# Patient Record
Sex: MALE | Race: WHITE | NOT HISPANIC OR LATINO | Employment: OTHER | URBAN - METROPOLITAN AREA
[De-identification: names, ages, dates, MRNs, and addresses within clinical notes are randomized per-mention and may not be internally consistent; named-entity substitution may affect disease eponyms.]

---

## 2018-11-02 ENCOUNTER — TRANSCRIBE ORDERS (OUTPATIENT)
Dept: PULMONOLOGY | Facility: HOSPITAL | Age: 38
End: 2018-11-02

## 2018-11-02 DIAGNOSIS — G47.30 SLEEP APNEA, UNSPECIFIED TYPE: Primary | ICD-10-CM

## 2018-11-29 ENCOUNTER — HOSPITAL ENCOUNTER (OUTPATIENT)
Dept: SLEEP MEDICINE | Facility: HOSPITAL | Age: 38
Discharge: HOME | End: 2018-11-29
Admitting: INTERNAL MEDICINE

## 2018-11-29 VITALS
HEIGHT: 72 IN | DIASTOLIC BLOOD PRESSURE: 92 MMHG | HEART RATE: 94 BPM | WEIGHT: 214.8 LBS | OXYGEN SATURATION: 95 % | BODY MASS INDEX: 29.09 KG/M2 | SYSTOLIC BLOOD PRESSURE: 156 MMHG

## 2018-11-29 DIAGNOSIS — G47.30 SLEEP APNEA, UNSPECIFIED TYPE: ICD-10-CM

## 2018-11-29 PROCEDURE — 95806 SLEEP STUDY UNATT&RESP EFFT: CPT

## 2019-01-08 ENCOUNTER — LAB (OUTPATIENT)
Dept: LAB | Facility: HOSPITAL | Age: 39
End: 2019-01-08

## 2019-01-08 ENCOUNTER — OFFICE VISIT (OUTPATIENT)
Dept: INTERNAL MEDICINE | Facility: CLINIC | Age: 39
End: 2019-01-08

## 2019-01-08 ENCOUNTER — HOSPITAL ENCOUNTER (INPATIENT)
Facility: HOSPITAL | Age: 39
LOS: 2 days | Discharge: HOME | End: 2019-01-10
Attending: EMERGENCY MEDICINE | Admitting: INTERNAL MEDICINE

## 2019-01-08 ENCOUNTER — HOSPITAL ENCOUNTER (OUTPATIENT)
Dept: CT IMAGING | Facility: HOSPITAL | Age: 39
Discharge: HOME | End: 2019-01-08
Admitting: NURSE PRACTITIONER

## 2019-01-08 VITALS
TEMPERATURE: 99 F | WEIGHT: 210 LBS | HEART RATE: 88 BPM | SYSTOLIC BLOOD PRESSURE: 118 MMHG | HEIGHT: 71 IN | BODY MASS INDEX: 29.4 KG/M2 | DIASTOLIC BLOOD PRESSURE: 86 MMHG

## 2019-01-08 DIAGNOSIS — K57.20 ABSCESS OF SIGMOID COLON DUE TO DIVERTICULITIS: Primary | ICD-10-CM

## 2019-01-08 DIAGNOSIS — R10.32 LEFT LOWER QUADRANT PAIN: ICD-10-CM

## 2019-01-08 DIAGNOSIS — R10.32 LEFT LOWER QUADRANT PAIN: Primary | ICD-10-CM

## 2019-01-08 PROBLEM — Z72.0 TOBACCO ABUSE: Status: ACTIVE | Noted: 2019-01-08

## 2019-01-08 PROBLEM — A41.9 SEPSIS: Status: ACTIVE | Noted: 2019-01-08

## 2019-01-08 LAB
ALBUMIN SERPL-MCNC: 4.13 G/DL (ref 3.2–4.8)
ALBUMIN SERPL-MCNC: 4.14 G/DL (ref 3.2–4.8)
ALBUMIN/GLOB SERPL: 1.4 G/DL (ref 1.5–2.5)
ALBUMIN/GLOB SERPL: 1.6 G/DL (ref 1.5–2.5)
ALP SERPL-CCNC: 84 U/L (ref 25–100)
ALP SERPL-CCNC: 90 U/L (ref 25–100)
ALT SERPL W P-5'-P-CCNC: 28 U/L (ref 7–40)
ALT SERPL W P-5'-P-CCNC: 30 U/L (ref 7–40)
ANION GAP SERPL CALCULATED.3IONS-SCNC: 10 MMOL/L (ref 3–11)
ANION GAP SERPL CALCULATED.3IONS-SCNC: 9 MMOL/L (ref 3–11)
AST SERPL-CCNC: 21 U/L (ref 0–33)
AST SERPL-CCNC: 23 U/L (ref 0–33)
BACTERIA UR QL AUTO: NORMAL /HPF
BASOPHILS # BLD AUTO: 0.05 10*3/MM3 (ref 0–0.2)
BASOPHILS # BLD AUTO: 0.05 10*3/MM3 (ref 0–0.2)
BASOPHILS NFR BLD AUTO: 0.3 % (ref 0–1)
BASOPHILS NFR BLD AUTO: 0.4 % (ref 0–1)
BILIRUB SERPL-MCNC: 0.6 MG/DL (ref 0.3–1.2)
BILIRUB SERPL-MCNC: 0.6 MG/DL (ref 0.3–1.2)
BILIRUB UR QL STRIP: NEGATIVE
BILIRUB UR QL STRIP: NEGATIVE
BUN BLD-MCNC: 11 MG/DL (ref 9–23)
BUN BLD-MCNC: 11 MG/DL (ref 9–23)
BUN/CREAT SERPL: 13.4 (ref 7–25)
BUN/CREAT SERPL: 14.1 (ref 7–25)
CALCIUM SPEC-SCNC: 8.8 MG/DL (ref 8.7–10.4)
CALCIUM SPEC-SCNC: 9 MG/DL (ref 8.7–10.4)
CHLORIDE SERPL-SCNC: 98 MMOL/L (ref 99–109)
CHLORIDE SERPL-SCNC: 99 MMOL/L (ref 99–109)
CLARITY UR: CLEAR
CLARITY UR: CLEAR
CO2 SERPL-SCNC: 26 MMOL/L (ref 20–31)
CO2 SERPL-SCNC: 26 MMOL/L (ref 20–31)
COLOR UR: YELLOW
COLOR UR: YELLOW
CREAT BLD-MCNC: 0.78 MG/DL (ref 0.6–1.3)
CREAT BLD-MCNC: 0.82 MG/DL (ref 0.6–1.3)
D-LACTATE SERPL-SCNC: 0.7 MMOL/L (ref 0.5–2)
DEPRECATED RDW RBC AUTO: 42.9 FL (ref 37–54)
DEPRECATED RDW RBC AUTO: 44.2 FL (ref 37–54)
EOSINOPHIL # BLD AUTO: 0.06 10*3/MM3 (ref 0–0.3)
EOSINOPHIL # BLD AUTO: 0.08 10*3/MM3 (ref 0–0.3)
EOSINOPHIL NFR BLD AUTO: 0.5 % (ref 0–3)
EOSINOPHIL NFR BLD AUTO: 0.5 % (ref 0–3)
ERYTHROCYTE [DISTWIDTH] IN BLOOD BY AUTOMATED COUNT: 12.4 % (ref 11.3–14.5)
ERYTHROCYTE [DISTWIDTH] IN BLOOD BY AUTOMATED COUNT: 12.7 % (ref 11.3–14.5)
GFR SERPL CREATININE-BSD FRML MDRD: 105 ML/MIN/1.73
GFR SERPL CREATININE-BSD FRML MDRD: 111 ML/MIN/1.73
GLOBULIN UR ELPH-MCNC: 2.6 GM/DL
GLOBULIN UR ELPH-MCNC: 2.9 GM/DL
GLUCOSE BLD-MCNC: 78 MG/DL (ref 70–100)
GLUCOSE BLD-MCNC: 83 MG/DL (ref 70–100)
GLUCOSE UR STRIP-MCNC: NEGATIVE MG/DL
GLUCOSE UR STRIP-MCNC: NEGATIVE MG/DL
HCT VFR BLD AUTO: 39.7 % (ref 38.9–50.9)
HCT VFR BLD AUTO: 42.5 % (ref 38.9–50.9)
HGB BLD-MCNC: 13.1 G/DL (ref 13.1–17.5)
HGB BLD-MCNC: 13.9 G/DL (ref 13.1–17.5)
HGB UR QL STRIP.AUTO: NEGATIVE
HGB UR QL STRIP.AUTO: NEGATIVE
HYALINE CASTS UR QL AUTO: NORMAL /LPF
IMM GRANULOCYTES # BLD AUTO: 0.05 10*3/MM3 (ref 0–0.03)
IMM GRANULOCYTES # BLD AUTO: 0.06 10*3/MM3 (ref 0–0.03)
IMM GRANULOCYTES NFR BLD AUTO: 0.4 % (ref 0–0.6)
IMM GRANULOCYTES NFR BLD AUTO: 0.4 % (ref 0–0.6)
KETONES UR QL STRIP: ABNORMAL
KETONES UR QL STRIP: ABNORMAL
LEUKOCYTE ESTERASE UR QL STRIP.AUTO: NEGATIVE
LEUKOCYTE ESTERASE UR QL STRIP.AUTO: NEGATIVE
LYMPHOCYTES # BLD AUTO: 1.42 10*3/MM3 (ref 0.6–4.8)
LYMPHOCYTES # BLD AUTO: 1.69 10*3/MM3 (ref 0.6–4.8)
LYMPHOCYTES NFR BLD AUTO: 11 % (ref 24–44)
LYMPHOCYTES NFR BLD AUTO: 11.6 % (ref 24–44)
MCH RBC QN AUTO: 30.9 PG (ref 27–31)
MCH RBC QN AUTO: 31.2 PG (ref 27–31)
MCHC RBC AUTO-ENTMCNC: 32.7 G/DL (ref 32–36)
MCHC RBC AUTO-ENTMCNC: 33 G/DL (ref 32–36)
MCV RBC AUTO: 93.6 FL (ref 80–99)
MCV RBC AUTO: 95.3 FL (ref 80–99)
MONOCYTES # BLD AUTO: 2.12 10*3/MM3 (ref 0–1)
MONOCYTES # BLD AUTO: 2.41 10*3/MM3 (ref 0–1)
MONOCYTES NFR BLD AUTO: 16.4 % (ref 0–12)
MONOCYTES NFR BLD AUTO: 16.5 % (ref 0–12)
NEUTROPHILS # BLD AUTO: 10.34 10*3/MM3 (ref 1.5–8.3)
NEUTROPHILS # BLD AUTO: 9.25 10*3/MM3 (ref 1.5–8.3)
NEUTROPHILS NFR BLD AUTO: 71.1 % (ref 41–71)
NEUTROPHILS NFR BLD AUTO: 71.7 % (ref 41–71)
NITRITE UR QL STRIP: NEGATIVE
NITRITE UR QL STRIP: NEGATIVE
PH UR STRIP.AUTO: 5.5 [PH] (ref 5–8)
PH UR STRIP.AUTO: 6 [PH] (ref 5–8)
PLATELET # BLD AUTO: 343 10*3/MM3 (ref 150–450)
PLATELET # BLD AUTO: 384 10*3/MM3 (ref 150–450)
PMV BLD AUTO: 10.1 FL (ref 6–12)
PMV BLD AUTO: 10.6 FL (ref 6–12)
POTASSIUM BLD-SCNC: 3.8 MMOL/L (ref 3.5–5.5)
POTASSIUM BLD-SCNC: 4.2 MMOL/L (ref 3.5–5.5)
PROT SERPL-MCNC: 6.7 G/DL (ref 5.7–8.2)
PROT SERPL-MCNC: 7 G/DL (ref 5.7–8.2)
PROT UR QL STRIP: NEGATIVE
PROT UR QL STRIP: NEGATIVE
RBC # BLD AUTO: 4.24 10*6/MM3 (ref 4.2–5.76)
RBC # BLD AUTO: 4.46 10*6/MM3 (ref 4.2–5.76)
RBC # UR: NORMAL /HPF
REF LAB TEST METHOD: NORMAL
SODIUM BLD-SCNC: 133 MMOL/L (ref 132–146)
SODIUM BLD-SCNC: 135 MMOL/L (ref 132–146)
SP GR UR STRIP: 1.01 (ref 1–1.03)
SP GR UR STRIP: 1.09 (ref 1–1.03)
SQUAMOUS #/AREA URNS HPF: NORMAL /HPF
UROBILINOGEN UR QL STRIP: ABNORMAL
UROBILINOGEN UR QL STRIP: ABNORMAL
WBC NRBC COR # BLD: 12.9 10*3/MM3 (ref 3.5–10.8)
WBC NRBC COR # BLD: 14.57 10*3/MM3 (ref 3.5–10.8)
WBC UR QL AUTO: NORMAL /HPF

## 2019-01-08 PROCEDURE — 85025 COMPLETE CBC W/AUTO DIFF WBC: CPT | Performed by: NURSE PRACTITIONER

## 2019-01-08 PROCEDURE — 85025 COMPLETE CBC W/AUTO DIFF WBC: CPT | Performed by: PHYSICIAN ASSISTANT

## 2019-01-08 PROCEDURE — 83605 ASSAY OF LACTIC ACID: CPT | Performed by: PHYSICIAN ASSISTANT

## 2019-01-08 PROCEDURE — 80053 COMPREHEN METABOLIC PANEL: CPT

## 2019-01-08 PROCEDURE — 25010000002 IOPAMIDOL 61 % SOLUTION: Performed by: NURSE PRACTITIONER

## 2019-01-08 PROCEDURE — 99214 OFFICE O/P EST MOD 30 MIN: CPT | Performed by: NURSE PRACTITIONER

## 2019-01-08 PROCEDURE — 81001 URINALYSIS AUTO W/SCOPE: CPT

## 2019-01-08 PROCEDURE — 99223 1ST HOSP IP/OBS HIGH 75: CPT | Performed by: INTERNAL MEDICINE

## 2019-01-08 PROCEDURE — 74178 CT ABD&PLV WO CNTR FLWD CNTR: CPT

## 2019-01-08 PROCEDURE — 99284 EMERGENCY DEPT VISIT MOD MDM: CPT

## 2019-01-08 PROCEDURE — 25010000002 PIPERACILLIN SOD-TAZOBACTAM PER 1 G: Performed by: PHYSICIAN ASSISTANT

## 2019-01-08 PROCEDURE — 80053 COMPREHEN METABOLIC PANEL: CPT | Performed by: PHYSICIAN ASSISTANT

## 2019-01-08 PROCEDURE — 99406 BEHAV CHNG SMOKING 3-10 MIN: CPT | Performed by: PHYSICIAN ASSISTANT

## 2019-01-08 PROCEDURE — 81003 URINALYSIS AUTO W/O SCOPE: CPT | Performed by: PHYSICIAN ASSISTANT

## 2019-01-08 PROCEDURE — 36415 COLL VENOUS BLD VENIPUNCTURE: CPT

## 2019-01-08 RX ORDER — SODIUM CHLORIDE 9 MG/ML
100 INJECTION, SOLUTION INTRAVENOUS CONTINUOUS
Status: DISCONTINUED | OUTPATIENT
Start: 2019-01-08 | End: 2019-01-10 | Stop reason: HOSPADM

## 2019-01-08 RX ORDER — ACETAMINOPHEN 325 MG/1
650 TABLET ORAL EVERY 4 HOURS PRN
Status: DISCONTINUED | OUTPATIENT
Start: 2019-01-08 | End: 2019-01-10 | Stop reason: HOSPADM

## 2019-01-08 RX ORDER — SODIUM CHLORIDE 0.9 % (FLUSH) 0.9 %
3-10 SYRINGE (ML) INJECTION AS NEEDED
Status: DISCONTINUED | OUTPATIENT
Start: 2019-01-08 | End: 2019-01-10 | Stop reason: HOSPADM

## 2019-01-08 RX ORDER — SODIUM CHLORIDE 0.9 % (FLUSH) 0.9 %
3 SYRINGE (ML) INJECTION EVERY 12 HOURS SCHEDULED
Status: DISCONTINUED | OUTPATIENT
Start: 2019-01-08 | End: 2019-01-10 | Stop reason: HOSPADM

## 2019-01-08 RX ORDER — SODIUM CHLORIDE 0.9 % (FLUSH) 0.9 %
10 SYRINGE (ML) INJECTION AS NEEDED
Status: DISCONTINUED | OUTPATIENT
Start: 2019-01-08 | End: 2019-01-10 | Stop reason: HOSPADM

## 2019-01-08 RX ORDER — OXYMETAZOLINE HYDROCHLORIDE 1 G/100G
1 CREAM TOPICAL EVERY MORNING
Refills: 2 | COMMUNITY
Start: 2018-11-01 | End: 2023-03-14

## 2019-01-08 RX ADMIN — TAZOBACTAM SODIUM AND PIPERACILLIN SODIUM 4.5 G: 500; 4 INJECTION, SOLUTION INTRAVENOUS at 19:51

## 2019-01-08 RX ADMIN — SODIUM CHLORIDE 100 ML/HR: 9 INJECTION, SOLUTION INTRAVENOUS at 22:15

## 2019-01-08 RX ADMIN — IOPAMIDOL 95 ML: 612 INJECTION, SOLUTION INTRAVENOUS at 17:22

## 2019-01-08 RX ADMIN — SODIUM CHLORIDE 1000 ML: 9 INJECTION, SOLUTION INTRAVENOUS at 19:10

## 2019-01-08 NOTE — PROGRESS NOTES
Arie Hayden  1980  [id number]  Patient Care Team:  Maira Dawkins PA-C as PCP - General (Internal Medicine)    Arie Hayden is a pleasant 38 y.o. male who presents for evaluation of Fatigue (started the Saturday before New years ); Night Sweats; and Abdominal Pain      Chief Complaint   Patient presents with   • Fatigue     started the Saturday before New years    • Night Sweats   • Abdominal Pain       HPI:   Onset LLQ abdominal pain 10 days, better then worse.  Sig night sweats last 2 nights, no significant cough, denies sob or fever. Has constant dull mild left lower abdominal pain.  Denies nausea or vomiting.Some diarrhea a few days ago, mild.  No blood in stool.   No sore throat, no ill contacts, no travel out of the country.  No LE swelling.  Reports regular alcohol consumption 2-3 times a week no change.  Reports social smoking, no change.   History reviewed. No pertinent past medical history.    History reviewed. No pertinent surgical history.    History reviewed. No pertinent family history.    Social History     Tobacco Use   Smoking Status Light Tobacco Smoker   • Types: Cigarettes   • Start date: 1/8/2004   Smokeless Tobacco Former User   Tobacco Comment    occasionally        Allergies   Allergen Reactions   • Pollen Extract Other (See Comments)     Sinus related       Review of Systems   Constitutional: Positive for chills, fatigue and fever.   HENT: Negative for congestion, ear pain and sinus pressure.    Respiratory: Negative for cough, chest tightness, shortness of breath and wheezing.    Cardiovascular: Negative for chest pain and palpitations.   Gastrointestinal: Positive for abdominal pain. Negative for blood in stool and constipation.   Skin: Negative for color change.   Allergic/Immunologic: Positive for environmental allergies.   Neurological: Positive for headache. Negative for dizziness and speech difficulty.   Psychiatric/Behavioral: Negative for decreased concentration. The patient  "is not nervous/anxious.        Vitals:    01/08/19 1425   BP: 118/86   BP Location: Left arm   Patient Position: Sitting   Cuff Size: Adult   Pulse: 88   Temp: 99 °F (37.2 °C)   TempSrc: Temporal   Weight: 95.3 kg (210 lb)   Height: 181.6 cm (71.5\")   PainSc:   4   PainLoc: Abdomen         Current Outpatient Medications:   •  loratadine-pseudoephedrine (CLARITIN-D 24 HOUR)  MG per 24 hr tablet, Take  by mouth Daily As Needed for Allergies., Disp: , Rfl:   •  RHOFADE 1 % cream, , Disp: , Rfl: 2    Physical Exam   Constitutional: He appears well-developed and well-nourished.   HENT:   Head: Normocephalic and atraumatic.   Right Ear: External ear normal.   Left Ear: External ear normal.   Mouth/Throat: Oropharynx is clear and moist.   Eyes: Conjunctivae and EOM are normal.   Neck: Normal range of motion. Neck supple.   Cardiovascular: Normal rate, regular rhythm and normal heart sounds.   Pulmonary/Chest: Effort normal and breath sounds normal.   Abdominal: Soft. He exhibits no mass. Bowel sounds are decreased. There is no hepatosplenomegaly or splenomegaly. There is tenderness in the left lower quadrant. There is guarding. There is no rebound.   Musculoskeletal: Normal range of motion.   Lymphadenopathy:     He has no cervical adenopathy.   Neurological: He is alert.   Skin: Skin is warm and dry.   Psychiatric: He has a normal mood and affect. His behavior is normal. Thought content normal.       Results Review:    None    Assessment/Plan:    Problem List Items Addressed This Visit     Left lower quadrant pain - Primary    Relevant Orders    CBC & Differential    Comprehensive Metabolic Panel    Urinalysis With Microscopic - Urine, Clean Catch    CT Abdomen Pelvis With & Without Contrast          Plan of care reviewed with patient at the conclusion of today's visit. Education was provided regarding diagnosis, management and any prescribed or recommended OTC medications.  Patient verbalizes understanding of and " agreement with management plan.    Return in about 2 weeks (around 1/22/2019) for Recheck.    ADRIÁN Mcconnell

## 2019-01-09 LAB
ANION GAP SERPL CALCULATED.3IONS-SCNC: 8 MMOL/L (ref 3–11)
BUN BLD-MCNC: 8 MG/DL (ref 9–23)
BUN/CREAT SERPL: 11.6 (ref 7–25)
CALCIUM SPEC-SCNC: 8.3 MG/DL (ref 8.7–10.4)
CHLORIDE SERPL-SCNC: 105 MMOL/L (ref 99–109)
CO2 SERPL-SCNC: 22 MMOL/L (ref 20–31)
CREAT BLD-MCNC: 0.69 MG/DL (ref 0.6–1.3)
DEPRECATED RDW RBC AUTO: 43.4 FL (ref 37–54)
ERYTHROCYTE [DISTWIDTH] IN BLOOD BY AUTOMATED COUNT: 12.7 % (ref 11.3–14.5)
GFR SERPL CREATININE-BSD FRML MDRD: 128 ML/MIN/1.73
GLUCOSE BLD-MCNC: 84 MG/DL (ref 70–100)
HCT VFR BLD AUTO: 36.6 % (ref 38.9–50.9)
HGB BLD-MCNC: 12.1 G/DL (ref 13.1–17.5)
MCH RBC QN AUTO: 30.9 PG (ref 27–31)
MCHC RBC AUTO-ENTMCNC: 33.1 G/DL (ref 32–36)
MCV RBC AUTO: 93.6 FL (ref 80–99)
PLATELET # BLD AUTO: 330 10*3/MM3 (ref 150–450)
PMV BLD AUTO: 10.1 FL (ref 6–12)
POTASSIUM BLD-SCNC: 3.6 MMOL/L (ref 3.5–5.5)
RBC # BLD AUTO: 3.91 10*6/MM3 (ref 4.2–5.76)
SODIUM BLD-SCNC: 135 MMOL/L (ref 132–146)
WBC NRBC COR # BLD: 12.35 10*3/MM3 (ref 3.5–10.8)

## 2019-01-09 PROCEDURE — 80048 BASIC METABOLIC PNL TOTAL CA: CPT | Performed by: PHYSICIAN ASSISTANT

## 2019-01-09 PROCEDURE — 85027 COMPLETE CBC AUTOMATED: CPT | Performed by: PHYSICIAN ASSISTANT

## 2019-01-09 PROCEDURE — 99233 SBSQ HOSP IP/OBS HIGH 50: CPT | Performed by: INTERNAL MEDICINE

## 2019-01-09 PROCEDURE — 25010000002 PIPERACILLIN SOD-TAZOBACTAM PER 1 G: Performed by: INTERNAL MEDICINE

## 2019-01-09 RX ADMIN — TAZOBACTAM SODIUM AND PIPERACILLIN SODIUM 4.5 G: 500; 4 INJECTION, SOLUTION INTRAVENOUS at 19:46

## 2019-01-09 RX ADMIN — SODIUM CHLORIDE, PRESERVATIVE FREE 3 ML: 5 INJECTION INTRAVENOUS at 20:13

## 2019-01-09 RX ADMIN — TAZOBACTAM SODIUM AND PIPERACILLIN SODIUM 4.5 G: 500; 4 INJECTION, SOLUTION INTRAVENOUS at 04:01

## 2019-01-09 RX ADMIN — ACETAMINOPHEN 650 MG: 325 TABLET ORAL at 17:29

## 2019-01-09 RX ADMIN — TAZOBACTAM SODIUM AND PIPERACILLIN SODIUM 4.5 G: 500; 4 INJECTION, SOLUTION INTRAVENOUS at 12:00

## 2019-01-09 RX ADMIN — SODIUM CHLORIDE 100 ML/HR: 9 INJECTION, SOLUTION INTRAVENOUS at 19:46

## 2019-01-09 RX ADMIN — SODIUM CHLORIDE 100 ML/HR: 9 INJECTION, SOLUTION INTRAVENOUS at 08:33

## 2019-01-09 NOTE — ED PROVIDER NOTES
Subjective   38-year-old male presents to the emergency department after obtaining an outpatient CT showing diverticulitis with abscess formation.  The patient reports that he has had left lower quadrant abdominal pain for about 10 days.  He went to see his PCP and was sent for an outpatient CT at our facility.  Upon completion of his CT scan, he was noted to have the diverticulitis with abscess formation and was sent to our emergency department for further evaluation and treatment.  The patient states that he has had some chills.  He has not checked his temperature.  He has had no associated nausea or vomiting.  Normal urination.  Normal bowel movements.  No blood in his stools.  He denies any known health issues.            Review of Systems   Constitutional: Positive for chills.   Respiratory: Negative for cough and shortness of breath.    Cardiovascular: Negative for chest pain.   Gastrointestinal: Positive for abdominal pain (LLQ). Negative for blood in stool, constipation, diarrhea, nausea and vomiting.   Endocrine: Negative for polydipsia, polyphagia and polyuria.   Genitourinary: Negative for dysuria.   Musculoskeletal: Negative for back pain.   Skin: Negative for pallor.   Allergic/Immunologic: Negative for immunocompromised state.   Neurological: Negative for light-headedness and headaches.   Hematological: Negative.    Psychiatric/Behavioral: Negative.        Past Medical History:   Diagnosis Date   • Environmental allergies        Allergies   Allergen Reactions   • Pollen Extract Other (See Comments)     Sinus related       Past Surgical History:   Procedure Laterality Date   • TONSILLECTOMY     • VASECTOMY         History reviewed. No pertinent family history.    Social History     Socioeconomic History   • Marital status:      Spouse name: Not on file   • Number of children: Not on file   • Years of education: Not on file   • Highest education level: Not on file   Tobacco Use   • Smoking status:  Light Tobacco Smoker     Types: Cigarettes     Start date: 1/8/2004   • Smokeless tobacco: Former User   • Tobacco comment: occasionally    Substance and Sexual Activity   • Alcohol use: Yes     Alcohol/week: 1.2 oz     Types: 2 Cans of beer per week   • Drug use: No           Objective   Physical Exam   Constitutional: He is oriented to person, place, and time. He appears well-developed and well-nourished. He does not appear ill.   HENT:   Head: Normocephalic.   Nose: Nose normal.   Mouth/Throat: Oropharynx is clear and moist.   Eyes: Conjunctivae and EOM are normal. Pupils are equal, round, and reactive to light. No scleral icterus.   Neck: Normal range of motion. Neck supple.   Cardiovascular: Normal rate, regular rhythm, normal heart sounds and intact distal pulses.   Pulmonary/Chest: Effort normal and breath sounds normal. No respiratory distress.   Abdominal: Soft. There is tenderness ( and moderate left lower quadrant tenderness). There is no guarding.   Musculoskeletal: Normal range of motion. He exhibits no tenderness.   Neurological: He is alert and oriented to person, place, and time.   Skin: Skin is warm and dry.   Psychiatric: He has a normal mood and affect.       Procedures           ED Course    The patient appears in no distress.  CT of the abdomen and pelvis shows inflammatory changes involving the sigmoid colon with a 6 cm abscess just superior to the sigmoid colon.  His temperature is 99.9.  His white count is mildly elevated at 12.9.  The patient was given IV Zosyn.  I will consult surgery and plan to admit the patient.          MDM      Final diagnoses:   Abscess of sigmoid colon due to diverticulitis            Nathaniel Masterson, PA  01/08/19 1919

## 2019-01-09 NOTE — PROGRESS NOTES
Continued Stay Note  [facility]     Patient Name: Arie Hayden  MRN: [id number]  Today's Date: 1/9/2019    Admit Date: 1/8/2019    Discharge Plan     Row Name 01/09/19 1615       Plan    Plan  discharge plan    Patient/Family in Agreement with Plan  yes    Plan Comments Plan is home. Discharge needs pending [facility] final recommendations- possibly outpatient IV antibiotics  At [facility]'s office. CM will cont to follow,        Discharge Codes    No documentation.       Expected Discharge Date and Time     Expected Discharge Date Expected Discharge Time    Jan 13, 2019             Jenn Anthony RN

## 2019-01-09 NOTE — PROGRESS NOTES
Discharge Planning Assessment  [facility]     Patient Name: Arie Hayden  MRN: [id number]  Today's Date: 1/9/2019    Admit Date: 1/8/2019    Discharge Needs Assessment     Row Name 01/09/19 1611       Living Environment    Lives With  spouse    Name(s) of Who Lives With Patient  Donna(spouse)    Current Living Arrangements  home/apartment/condo    Primary Care Provided by  self    Provides Primary Care For  no one    Family Caregiver if Needed  spouse    Quality of Family Relationships  involved;helpful;supportive    Able to Return to Prior Arrangements  yes    Living Arrangement Comments  SPoke with pt and pt's spouse, Donna in room with permission in regards to discharge planning. Pt resides in [place] [facility] [facility] with spouse.       Resource/Environmental Concerns    Resource/Environmental Concerns  none       Transition Planning    Patient/Family Anticipates Transition to  home with family    Patient/Family Anticipated Services at Transition      Transportation Anticipated  car, drives self;family or friend will provide       Discharge Needs Assessment    Readmission Within the Last 30 Days  no previous admission in last 30 days    Concerns to be Addressed  discharge planning    Equipment Currently Used at Home  none    Discharge Coordination/Progress  Pt has [employer] insurance and denies changes in insurance. Pt has prescription coverage and uses  [facility]. Plan is home with family at discharge. Denies discharge needs at this time. CM will cont to follow. Discharge needs pending [facility] final recommendations.             Discharge Plan     Row Name 01/09/19 1615       Plan    Plan  discharge plan    Patient/Family in Agreement with Plan  yes    Plan Comments  Plan is home. Discharge needs pending [facility] final recommendations- possibly in [facility]'s office. CM will cont to follow,        Destination      No service coordination in this encounter.      Durable Medical Equipment      No service  coordination in this encounter.      Dialysis/Infusion      No service coordination in this encounter.      Home Medical Care      No service coordination in this encounter.      Community Resources      No service coordination in this encounter.        Expected Discharge Date and Time     Expected Discharge Date Expected Discharge Time    Jan 13, 2019         Demographic Summary     Row Name 01/09/19 1610       General Information    General Information Comments  Pt's PCP is Maira Dawkins       Contact Information    Permission Granted to Share Info With      Contact Information Obtained for      Contact Information Comments  Donna Hayden(spouse):  [phone]        Functional Status     Row Name 01/09/19 1611       Functional Status, IADL    IADL Comments  Pt is independent of ADLs        Psychosocial    No documentation.       Abuse/Neglect    No documentation.       Legal    No documentation.       Substance Abuse    No documentation.       Patient Forms    No documentation.           Jenn Anthony RN

## 2019-01-09 NOTE — PLAN OF CARE
Problem: Patient Care Overview  Goal: Plan of Care Review  Outcome: Ongoing (interventions implemented as appropriate)   01/08/19 2257   Coping/Psychosocial   Plan of Care Reviewed With patient   Plan of Care Review   Progress no change   OTHER   Outcome Summary VSS. Complaints of LLQ tenderness. Will continue to monitor.      Goal: Discharge Needs Assessment  Outcome: Ongoing (interventions implemented as appropriate)   01/08/19 2146   Disability   Equipment Currently Used at Home bipap/cpap   Discharge Needs Assessment   Patient/Family Anticipates Transition to home with family   Patient/Family Anticipated Services at Transition none   Transportation Anticipated family or friend will provide       Problem: Pain, Acute (Adult)  Goal: Acceptable Pain Control/Comfort Level  Outcome: Ongoing (interventions implemented as appropriate)   01/08/19 2257   Pain, Acute (Adult)   Acceptable Pain Control/Comfort Level making progress toward outcome       Problem: Infection, Risk/Actual (Adult)  Goal: Infection Prevention/Resolution  Outcome: Ongoing (interventions implemented as appropriate)   01/08/19 2257   Infection, Risk/Actual (Adult)   Infection Prevention/Resolution making progress toward outcome

## 2019-01-09 NOTE — PROGRESS NOTES
[facility] Medicine Services  PROGRESS NOTE    Patient Name: Arie Hayden  : 1980  MRN: [id number]    Date of Admission: 2019  Length of Stay: 1  Primary Care Physician: Maira Dawkins, ADDIS    Subjective   Subjective     CC:abd pain    HPI:No acute events overnight, patient states that he is feeling better, abd pain has improved, denies any n/v, endorsed some chills    Review of Systems  Gen- No fevers, +chills  CV- No chest pain, palpitations  Resp- No cough, dyspnea  GI- No N/V/D, +abd pain    Otherwise ROS is negative except as mentioned in the HPI.    Objective   Objective     Vital Signs:   Temp:  [98.5 °F (36.9 °C)-99.9 °F (37.7 °C)] 98.6 °F (37 °C)  Heart Rate:  [] 83  Resp:  [20] 20  BP: (107-141)/() 107/70       Physical Exam:  Constitutional: No acute distress, awake, alert, seated in chair  HENT: NCAT, mucous membranes moist  Respiratory: Clear to auscultation bilaterally, respiratory effort normal   Cardiovascular: RRR, no murmurs, rubs, or gallops, palpable pedal pulses bilaterally  Gastrointestinal: Positive bowel sounds, soft, LLQ ttp, no guarding, nondistended  Musculoskeletal: No bilateral ankle edema  Psychiatric: Appropriate affect, cooperative  Neurologic: Oriented x 3, no focal deficits  Skin: No rashes    Results Reviewed:  I have personally reviewed current lab, radiology, and data and agree.    Results from last 7 days   Lab Units  19   04519   18419   1610   WBC 10*3/mm3  12.35*  12.90*  14.57*   HEMOGLOBIN g/dL  12.1*  13.1  13.9   HEMATOCRIT %  36.6*  39.7  42.5   PLATELETS 10*3/mm3  330  343  384     Results from last 7 days   Lab Units  19   0454  19   18419   1610   SODIUM mmol/L  135  133  135   POTASSIUM mmol/L  3.6  3.8  4.2   CHLORIDE mmol/L  105  98*  99   CO2 mmol/L  22.0  26.0  26.0   BUN mg/dL  8*  11  11   CREATININE mg/dL  0.69  0.78  0.82   GLUCOSE mg/dL  84  78  83   CALCIUM  mg/dL  8.3*  9.0  8.8   ALT (SGPT) U/L   --   28  30   AST (SGOT) U/L   --   21  23     Estimated Creatinine Clearance: 170.8 mL/min (by C-G formula based on SCr of 0.69 mg/dL).    No results found for: BNP    Microbiology Results Abnormal     None          Imaging Results (last 24 hours)     ** No results found for the last 24 hours. **               I have reviewed the medications:    Current Facility-Administered Medications:   •  acetaminophen (TYLENOL) tablet 650 mg, 650 mg, Oral, Q4H PRN, Fauzia Avila, PA-C  •  piperacillin-tazobactam (ZOSYN) 4.5 g in iso-osmotic dextrose 100 mL IVPB (premix), 4.5 g, Intravenous, Q8H, Anthony Ventura MD, 4.5 g at 01/09/19 0401  •  [COMPLETED] Insert peripheral IV, , , Once **AND** sodium chloride 0.9 % flush 10 mL, 10 mL, Intravenous, PRN, Nathaniel Masterson PA  •  sodium chloride 0.9 % flush 3 mL, 3 mL, Intravenous, Q12H, Fauzia Avila, PA-C  •  sodium chloride 0.9 % flush 3-10 mL, 3-10 mL, Intravenous, PRN, Fauzia Avila, PA-C  •  Sodium chloride 0.9 % infusion, 100 mL/hr, Intravenous, Continuous, Ventura Avilanicia L, PA-C, Last Rate: 100 mL/hr at 01/09/19 0833, 100 mL/hr at 01/09/19 0833      Assessment/Plan   Assessment / Plan     Active Hospital Problems    Diagnosis Date Noted   • **Sepsis  [A41.9] 01/08/2019   • Tobacco abuse [Z72.0] 01/08/2019   • Abscess of sigmoid colon due to diverticulitis [K57.20] 01/08/2019        Brief Hospital Course to date:  Arie Hayden is a 38 y.o. male relatively healthy other than hx of tobacco abuse, p/w 10 days of progressively worsening LLQ abd pain with associated diarrhea and fever found to be septic sec to acute sigmoid diverticulitis with abscess.    Assessment and plan  - Sepsis per criteria with tachycardia and leukocytosis, source likely diverticulitis with abscess, f/u blood cultures, continue zosyn, IVF  - Acute sigmoid diverticulitis with abscess, no prior hx known, continue abx as above, ID and general surgery  following, plan for percutaneous drainage of abscess with cultures today.  - Tobacco abuse, counseled on cessation, continue NRT    DVT Prophylaxis:mechanical    Disposition: TBD    CODE STATUS:   Code Status and Medical Interventions:   Ordered at: 01/08/19 2101     Level Of Support Discussed With:    Patient     Code Status:    CPR     Medical Interventions (Level of Support Prior to Arrest):    Full       Electronically signed by Agapito Falcon MD, 01/09/19, 11:32 AM.

## 2019-01-09 NOTE — H&P
[facility] Medicine Services  HISTORY AND PHYSICAL    Patient Name: Arie Hayden  : 1980  MRN: [id number]  Primary Care Physician: Maira Dawkins PA-C  Date of admission: 2019      Subjective   Subjective     Chief Complaint:  Abdominal pain    HPI:  Arie Hayden is a 38 y.o. male with no significant past medical history who presents with complaints of left lower quadrant abdominal pain progressively worsening over the past 10 days. Pain is constant without radiation. 5/10 at its worse. No modifying factors. Patient had initially attributed pain to pulling a muscle while moving furniture. Was concerned when it didn't go away. Was evaluated by PCP today then sent to outpatient CT A/P and eventually directed to ED. He has had two episodes of non bloody diarrhea and a low grade subjective fever. No complaints of cough, congestion, chest pain, SOB, nausea, vomiting, or dysuria. He has had a vasectomy in the past but no abdominal surgeries. He drinks and smokes socially/occassionally. No family history of colon cancer, but states his mother had colon polpys removed. No other complaints at this time. Will admit for further evaluation and treatment.    Emergency Department Evaluation; temp 99.9. . WILLIAM 118/86.  WBC 12.9. UA negative. CT abdomen demonstrates acute diverticulitis of sigmoid colon with abscess.    37 YO MALE W/ NO PRIOR HX OF DIVERTICULITIS HERE AFTER 8 DAY HX OF LLQ PAIN WHICH HAS WORSENED OVER LAST COUPLE OF DAYS AND HAS BEEN ASSOCIATED W/ COLD SWEATS.  HE IS UNCERTAIN IF HE HAS HAD A FEVER.  PT WAS SEEN BY PCP TODAY WHO ORDERED CT AND WAS THEN REFERRED TO ER.  NO FAM HX OF CANCERS IN PARENTS/SIBLINGS.    Review of Systems   Constitutional: Positive for fever. Negative for chills.   HENT: Negative for congestion and trouble swallowing.    Eyes: Negative for photophobia and visual disturbance.   Respiratory: Negative for cough and shortness of breath.     Cardiovascular: Negative for chest pain and leg swelling.   Gastrointestinal: Positive for abdominal pain and diarrhea. Negative for blood in stool, nausea and vomiting.   Endocrine: Negative for cold intolerance and heat intolerance.   Genitourinary: Negative for dysuria and enuresis.   Musculoskeletal: Negative for back pain and gait problem.   Skin: Negative for pallor and rash.   Allergic/Immunologic: Negative for immunocompromised state.   Neurological: Negative for dizziness and headaches.   Hematological: Negative for adenopathy.   Psychiatric/Behavioral: Negative for agitation and confusion.          Otherwise 10-system ROS reviewed and is negative except as mentioned in the HPI.    Personal History     Past Medical History:   Diagnosis Date   • Environmental allergies        Past Surgical History:   Procedure Laterality Date   • TONSILLECTOMY     • VASECTOMY         Family History: family history is not on file. Otherwise pertinent FHx was reviewed and unremarkable.     Social History:  reports that he has been smoking cigarettes.  He started smoking about 15 years ago. He has quit using smokeless tobacco. He reports that he drinks about 1.2 oz of alcohol per week. He reports that he does not use drugs.  Social History     Social History Narrative   • Not on file       Medications:    Available home medication information reviewed.    (Not in a hospital admission)    Allergies   Allergen Reactions   • Pollen Extract Other (See Comments)     Sinus related       Objective   Objective     Vital Signs:   Temp:  [99 °F (37.2 °C)-99.9 °F (37.7 °C)] 99.9 °F (37.7 °C)  Heart Rate:  [] 100  Resp:  [20] 20  BP: (118-141)/() 119/83        Physical Exam   Constitutional: No acute distress, awake, alert  Eyes: PERRLA, sclerae anicteric, no conjunctival injection  HENT: NCAT, mucous membranes moist  Neck: Supple, no thyromegaly, no lymphadenopathy, trachea midline  Respiratory: Clear to auscultation  bilaterally, nonlabored respirations   Cardiovascular: RRR, no murmurs, rubs, or gallops, palpable pedal pulses bilaterally  Gastrointestinal: Positive bowel sounds, soft, tenderness to LLQ. No guarding or rebounding  Musculoskeletal: No bilateral ankle edema, no clubbing or cyanosis to extremities  Psychiatric: Appropriate affect, cooperative  Neurologic: Oriented x 3, strength symmetric in all extremities, Cranial Nerves grossly intact to confrontation, speech clear  Skin: No rashes    GEN; ALERT, ORIENTED, NAD  HEENT ;PERRLA, EOMI  CV; RRR, NO MRG  L; CTAB, NO WHEEZE/CRACKLES  ABD; MILD TTP LLQ, NO R/G  EXT; NO CCE, 2+ PULSES  SKIN; CDI, WARM  NEURO; GROSSLY INTACT  PSYCH; MOOD AND AFFECT APPROPRIATE    Results Reviewed:  I have personally reviewed current lab, radiology, and data and agree.    Results from last 7 days   Lab Units  01/08/19   1844   WBC 10*3/mm3  12.90*   HEMOGLOBIN g/dL  13.1   HEMATOCRIT %  39.7   PLATELETS 10*3/mm3  343     Results from last 7 days   Lab Units  01/08/19   1844   SODIUM mmol/L  133   POTASSIUM mmol/L  3.8   CHLORIDE mmol/L  98*   CO2 mmol/L  26.0   BUN mg/dL  11   CREATININE mg/dL  0.78   GLUCOSE mg/dL  78   CALCIUM mg/dL  9.0   ALT (SGPT) U/L  28   AST (SGOT) U/L  21     Estimated Creatinine Clearance: 151.1 mL/min (by C-G formula based on SCr of 0.78 mg/dL).  Brief Urine Lab Results  (Last result in the past 365 days)      Color   Clarity   Blood   Leuk Est   Nitrite   Protein   CREAT   Urine HCG        01/08/19 1846 Yellow Clear Negative Negative Negative Negative             No results found for: BNP  Imaging Results (last 24 hours)     ** No results found for the last 24 hours. **             Assessment/Plan   Assessment / Plan     Active Hospital Problems    Diagnosis Date Noted   • **Sepsis  [A41.9] 01/08/2019     Priority: High   • Abscess of sigmoid colon due to diverticulitis [K57.20] 01/08/2019     Priority: High   • Tobacco abuse [Z72.0] 01/08/2019     Priority: Low          1. Sepsis secondary to acute diverticulitis of sigmoid colon with abscess: CLINICALLY STABLE; NO PRIOR HX OF DIVERTICULITIS; CONT FLUIDS, ABX, PAIN CONTROL.  DR. GODINEZ NOTIFIED IN ER OF PT.  DR. RICKETTS WILL SEE AS WELL  - stable. Non toxic appearing  - no history of diverticulitis in the past  - start Zosyn plus IVF and obtain blood cultures  - NPO  - consult to colorectal surgery, appreciate input  - pain control with additional supportive care as needed  - am labs    2. Tobacco abuse:  - nicotine patch as needed. Smokes occasionally. Tobacco Cessation Counselin minutes of tobacco cessation counseling provided, including but not limited to, risks of ongoing tobacco use, pertinence to current or future health status and availability/examples of cessation resources.  Patient expresses desire to quit.    DVT prophylaxis: mechanical    CODE STATUS:  Full code, full support  Code Status and Medical Interventions:   Ordered at: 19     Level Of Support Discussed With:    Patient     Code Status:    CPR     Medical Interventions (Level of Support Prior to Arrest):    Full       Admission Status:  I believe this patient meets INPATIENT status due to the need for care which can only be reasonably provided in an hospital setting such as possible need for aggressive/expedited ancillary services and/or consultation services, IV medications, close physician monitoring, and/or procedures.  In such, I feel patient’s risk for adverse outcomes and need for care warrant INPATIENT evaluation and predict the patient’s care encounter to likely last beyond 2 midnights.      Electronically signed by Fauzia Avila PA-C, 19, 9:03 PM.      Electronically signed by Gilda Montejo MD, 19, 10:07 PM.

## 2019-01-09 NOTE — PROGRESS NOTES
"[facility] Progress Note    Admission Date: 2019    Arie Hayden  1980  [id number]    Date: 2019    Meds:    Anti-Infectives (From admission, onward)    Ordered     Dose/Rate Route Frequency Start Stop    19  piperacillin-tazobactam (ZOSYN) 4.5 g in iso-osmotic dextrose 100 mL IVPB (premix)     Ordering Provider:  Anthony Ventura MD    4.5 g  over 4 Hours Intravenous Every 8 Hours 19 0400 19 0346    19 1829  piperacillin-tazobactam (ZOSYN) 4.5 g in iso-osmotic dextrose 100 mL IVPB (premix)     Ordering Provider:  Nathaniel Masterson PA    4.5 g  over 30 Minutes Intravenous Once 19 1831 19 2140          CC  Diverticulitis with diverticular abscess      SUBJECTIVE: 19:  Patient is a 38 y.o. male who is seen this evening for evaluation of diverticulitis with diverticular abscess.  He has been ill for approximately 10 days with intermittent but progressively worsening lower abdominal pain.  Over the last 2 days the pain is been more severe.  This has been accompanied by chills and drenching sweats without documented fevers.  He was seen by his primary care provider today and a CT scan was ordered late this afternoon which revealed moderately severe sigmoid diverticulitis with a 6 cm diverticular abscess.  He has been given intravenous Zosyn in the emergency room.  He denies nausea, vomiting, and rectal pain.  He has no prior history of diverticulitis.  19:  Possible CT guided drainage today if amenable.  He feels \"pretty good\". No nausea.  He has remained afebrile.          PE:   Vital Signs  Temp (24hrs), Av.1 °F (37.3 °C), Min:98.5 °F (36.9 °C), Max:99.9 °F (37.7 °C)    Temp  Min: 98.5 °F (36.9 °C)  Max: 99.9 °F (37.7 °C)  BP  Min: 107/70  Max: 141/100  Pulse  Min: 83  Max: 100  Resp  Min: 20  Max: 20  SpO2  Min: 97 %  Max: 100 %    GENERAL: Awake and alert, in no acute distress.   HEENT:  No conjunctival injection. No icterus. Oropharynx clear without " evidence of thrush or exudate.  NECK: Supple, no JVD     HEART: RRR; No murmur, rubs, gallops.   LUNGS: Clear to auscultation bilaterally without wheezing, rales, rhonchi. Normal respiratory effort. Nonlabored. No dullness.  ABDOMEN: Soft, mildly  tender, nondistended. Positive bowel sounds. No rebound or guarding. NO mass or HSM.  EXT:  No cyanosis, clubbing or edema. No cord.  : Genitalia generally unremarkable.  Without Almonte catheter.  SKIN: Warm and dry without cutaneous eruptions on Inspection/palpation.    NEURO: Alert and oriented x 3, Motor 5/5 bilaterally    Laboratory Data    Results from last 7 days   Lab Units  01/09/19   0454  01/08/19   1844  01/08/19   1610   WBC 10*3/mm3  12.35*  12.90*  14.57*   HEMOGLOBIN g/dL  12.1*  13.1  13.9   HEMATOCRIT %  36.6*  39.7  42.5   PLATELETS 10*3/mm3  330  343  384     Results from last 7 days   Lab Units  01/09/19   0454   SODIUM mmol/L  135   POTASSIUM mmol/L  3.6   CHLORIDE mmol/L  105   CO2 mmol/L  22.0   BUN mg/dL  8*   CREATININE mg/dL  0.69   GLUCOSE mg/dL  84   CALCIUM mg/dL  8.3*     Results from last 7 days   Lab Units  01/08/19   1844   ALK PHOS U/L  84   BILIRUBIN mg/dL  0.6   ALT (SGPT) U/L  28   AST (SGOT) U/L  21             Results from last 7 days   Lab Units  01/08/19   1844   LACTATE mmol/L  0.7             Estimated Creatinine Clearance: 170.8 mL/min (by C-G formula based on SCr of 0.69 mg/dL).    Microbiology:                            Radiology:  Imaging Results (last 72 hours)     ** No results found for the last 72 hours. **          I personally read the radiographic studies     IMPRESSION:  1.  Sigmoid diverticulitis/diverticular abscess-he has extensive sigmoid diverticulitis with a large diverticular abscess.  He will likely require CT-guided abscess drainage.  We will give him intravenous Zosyn pending culture data.  2.  Leukocytosis/neutrophilia  3.  Abdominal pain-secondary to sigmoid diverticulitis     I reviewed his CT scan in  detail with Dr. Faith today.  I discussed his clinical situation with Dr. Iyer.  I'll also reviewed his radiographic images with Dr. Orellana.  There is some concern that we should wait until tomorrow to proceed with a CT-guided abscess drainage since radiologist would like this area to liquefy more before performing a drainage procedure.      RECOMMENDATIONS:  1.  Continue intravenous Zosyn  2.  CT-guided abscess drainage tomorrow  3.  Possible discharge tomorrow afternoon on outpatient intravenous antibiotic therapy in our office-I will consider switching him to Invanz.    Dr. Ventura has obtained the history, performed the physical exam and formulated the above treatment plan.     Discussed his complex situation in detail with him and his wife again today.    Anthony Ventura MD  1/9/2019  3:25 PM

## 2019-01-09 NOTE — CONSULTS
INFECTIOUS DISEASE CONSULT/INITIAL HOSPITAL VISIT    Arie Hayden  1980  [id number]    Date of Consult: 1/8/2019    Admission Date: 1/8/2019      Requesting Provider: No ref. provider found  Evaluating Physician: Anthony Ventura MD    Reason for Consultation: Diverticulitis/diverticular abscess     History of present illness:    Patient is a 38 y.o. male who is seen this evening for evaluation of diverticulitis with diverticular abscess.  He has been ill for approximately 10 days with intermittent but progressively worsening lower abdominal pain.  Over the last 2 days the pain is been more severe.  This has been accompanied by chills and drenching sweats without documented fevers.  He was seen by his primary care provider today and a CT scan was ordered late this afternoon which revealed moderately severe sigmoid diverticulitis with a 6 cm diverticular abscess.  He has been given intravenous Zosyn in the emergency room.  He denies nausea, vomiting, and rectal pain.  He has no prior history of diverticulitis.    Past Medical History:   Diagnosis Date   • Environmental allergies        Past Surgical History:   Procedure Laterality Date   • TONSILLECTOMY     • VASECTOMY         History reviewed. No pertinent family history.    Social History     Socioeconomic History   • Marital status:      Spouse name: Not on file   • Number of children: Not on file   • Years of education: Not on file   • Highest education level: Not on file   Social Needs   • Financial resource strain: Not on file   • Food insecurity - worry: Not on file   • Food insecurity - inability: Not on file   • Transportation needs - medical: Not on file   • Transportation needs - non-medical: Not on file   Occupational History   • Not on file   Tobacco Use   • Smoking status: Light Tobacco Smoker     Types: Cigarettes     Start date: 1/8/2004   • Smokeless tobacco: Former User   • Tobacco comment: occasionally    Substance and Sexual  Activity   • Alcohol use: Yes     Alcohol/week: 1.2 oz     Types: 2 Cans of beer per week   • Drug use: No   • Sexual activity: Not on file   Other Topics Concern   • Not on file   Social History Narrative   • Not on file       Allergies   Allergen Reactions   • Pollen Extract Other (See Comments)     Sinus related         Medication:    Current Facility-Administered Medications:   •  acetaminophen (TYLENOL) tablet 650 mg, 650 mg, Oral, Q4H PRN, Fauzia Avila PA-C  •  [START ON 1/9/2019] piperacillin-tazobactam (ZOSYN) 4.5 g in iso-osmotic dextrose 100 mL IVPB (premix), 4.5 g, Intravenous, Q8H, Anthony Ventura MD  •  [COMPLETED] Insert peripheral IV, , , Once **AND** sodium chloride 0.9 % flush 10 mL, 10 mL, Intravenous, PRN, Nathaniel Masterson PA  •  sodium chloride 0.9 % flush 3 mL, 3 mL, Intravenous, Q12H, Fauzia Avila PA-C  •  sodium chloride 0.9 % flush 3-10 mL, 3-10 mL, Intravenous, PRN, Fauzia Avila PA-C  •  Sodium chloride 0.9 % infusion, 100 mL/hr, Intravenous, Continuous, Fauzia Avila PA-C    Antibiotics:  Anti-Infectives (From admission, onward)    Ordered     Dose/Rate Route Frequency Start Stop    01/08/19 2203  piperacillin-tazobactam (ZOSYN) 4.5 g in iso-osmotic dextrose 100 mL IVPB (premix)     Ordering Provider:  Anthony Ventura MD    4.5 g  over 4 Hours Intravenous Every 8 Hours 01/09/19 0400 01/16/19 0346    01/08/19 1829  piperacillin-tazobactam (ZOSYN) 4.5 g in iso-osmotic dextrose 100 mL IVPB (premix)     Ordering Provider:  Nathaniel Masterson PA    4.5 g  over 30 Minutes Intravenous Once 01/08/19 1831 01/08/19 2140            Review of Systems:  Constitutional--he has subjective fevers but no documented fevers.  He complains of chills and drenching sweats.   He has some malaise and fatigue.  HEENT-- No new vision, hearing or throat complaints.   CV-- No chest pain, palpitation or syncope  Resp-- No SOB/cough/Hemoptysis  GI- he has lower abdominal pain,  predominantly on the left side.  He has some anorexia.  He denies nausea, vomiting, and diarrhea.  -- No dysuria, hematuria, or flank pain.  Denies hesitancy, urgency or flank pain.  Lymph- no swollen lymph nodes in neck/axilla or groin.   Heme- No active bruising or bleeding; no Hx of DVT or PE.  MS-- he has no history of arthritis  Neuro-- No acute focal weakness or numbness in the arms or legs.  No seizures.  Skin--No rashes or lesions      Physical Exam:   Vital Signs  Temp (24hrs), Av.5 °F (37.5 °C), Min:99 °F (37.2 °C), Max:99.9 °F (37.7 °C)    Temp  Min: 99 °F (37.2 °C)  Max: 99.9 °F (37.7 °C)  BP  Min: 118/86  Max: 141/100  Pulse  Min: 88  Max: 100  Resp  Min: 20  Max: 20  SpO2  Min: 97 %  Max: 100 %    GENERAL: Awake and alert, in no acute distress.   HEENT: Normocephalic, atraumatic.  PERRL. EOMI. No conjunctival injection. No icterus. Oropharynx clear without evidence of thrush or exudate. No evidence of peridontal disease.    NECK: Supple without nuchal rigidity. No mass.  LYMPH: No cervical, axillary or inguinal lymphadenopathy.  HEART: RRR; No murmur, rubs, gallops.   LUNGS: Clear to auscultation bilaterally without wheezing, rales, rhonchi. Normal respiratory effort. Nonlabored. No dullness.  ABDOMEN: He has moderate left lower quadrant abdominal tenderness and distention.  There is no rebound tenderness.  EXT:  No cyanosis, clubbing or edema. No cord.  : Genitalia generally unremarkable.  Without Almonte catheter.  MSK: FROM without joint effusions noted arms/legs.    SKIN: Warm and dry without cutaneous eruptions on Inspection/palpation.    NEURO: Oriented to PPT. No focal deficits on motor/sensory exam at arms/legs.  PSYCHIATRIC: Normal insight and judgement. Cooperative with PE    Laboratory Data    Results from last 7 days   Lab Units  19   1844  19   1610   WBC 10*3/mm3  12.90*  14.57*   HEMOGLOBIN g/dL  13.1  13.9   HEMATOCRIT %  39.7  42.5   PLATELETS 10*3/mm3  343  742      Results from last 7 days   Lab Units  19   1844   SODIUM mmol/L  133   POTASSIUM mmol/L  3.8   CHLORIDE mmol/L  98*   CO2 mmol/L  26.0   BUN mg/dL  11   CREATININE mg/dL  0.78   GLUCOSE mg/dL  78   CALCIUM mg/dL  9.0     Results from last 7 days   Lab Units  19   1844   ALK PHOS U/L  84   BILIRUBIN mg/dL  0.6   ALT (SGPT) U/L  28   AST (SGOT) U/L  21             Results from last 7 days   Lab Units  19   1844   LACTATE mmol/L  0.7             Estimated Creatinine Clearance: 151.1 mL/min (by C-G formula based on SCr of 0.78 mg/dL).      Microbiology:                  Radiology:  Imaging Results (last 72 hours)     ** No results found for the last 72 hours. **      Abdominal/pelvic CT scan today reveals a large diverticular abscess with moderately severe sigmoid diverticulitis.  The abscess is approximately 6 cm in size-I read      Impression:   1.  Sigmoid diverticulitis/diverticular abscess-he has extensive sigmoid diverticulitis with a large diverticular abscess.  He will likely require CT-guided abscess drainage.  We will give him intravenous Zosyn pending culture data.  2.  Leukocytosis/neutrophilia  3.  Abdominal pain-secondary to sigmoid diverticulitis      PLAN/RECOMMENDATIONS:   Thank you for asking us to see Arie Hayden, I recommend the followin.  Zosyn 4.5 g IV every 8 hours  2.  CT-guided abscess drainage with Gram stain, aerobic culture, and anaerobic culture  3.  Colorectal surgery consultation     I discussed his situation with Dr. Bradshaw in the emergency room and with Dr. Hamilton.    Anthony Ventura MD  2019  10:08 PM

## 2019-01-09 NOTE — CONSULTS
Patient seen and evaluated.  Full consult to follow.  Patient should remain NPO with IVF and IV antibiotics.  Will discuss with radiology possibility of percutaneous drainage of abscess today.    Evita Iyer MD    General Surgery Consultation Note    Date of Service: 1/9/2019    Arie Hayden  [id number]  1980      Referring Provider: Agapito Falcon MD    Location of Consult: [facility]     Reason for Consultation: Diverticulitis with abscess       History of Present Illness:  I am seeing, Arie Hayden, in consultation for Agapito Falcon MD regarding diverticulitis with abscess.  Mr. Hayden is a 38-year-old male with no significant medical history who was referred to the emergency department last evening by his primary care physician after a CT scan obtained for abdominal pain demonstrated diverticulitis with an abscess.  The patient was in his usual state of health until approximately 10 days ago.  At that time, he developed left lower quadrant abdominal pain after moving items around the house that persisted until his presentation to his primary care physician.  The pain was described as an ache hat was 5/10 on the pain scale at its most severe.  There were no precipitating or palliating factors.  He describes the fevers over the past 10 days along with chills, but he denies nausea, emesis, or lack of bowel function.  His stools have been loose over the past several days.  He has never had a colonoscopy.  He relates at least one prior episode of similar abdominal pain that resolved on its own.  He did not present to medical attention at that time.         Past Medical History:   Diagnosis Date   • Environmental allergies        Past Surgical History:   Procedure Laterality Date   • TONSILLECTOMY     • VASECTOMY         Allergies   Allergen Reactions   • Pollen Extract Other (See Comments)     Sinus related       Current Facility-Administered Medications on File Prior to Encounter   Medication Dose Route  Frequency Provider Last Rate Last Dose   • [COMPLETED] iopamidol (ISOVUE-300) 61 % injection 100 mL  100 mL Intravenous Once in imaging [facility] [employer] APROMER   95 mL at 01/08/19 1722     Current Outpatient Medications on File Prior to Encounter   Medication Sig Dispense Refill   • RHOFADE 1 % cream Apply 1 application topically Every Morning.  2         Current Facility-Administered Medications:   •  acetaminophen (TYLENOL) tablet 650 mg, 650 mg, Oral, Q4H PRN, Fauzia Avila PA-C  •  piperacillin-tazobactam (ZOSYN) 4.5 g in iso-osmotic dextrose 100 mL IVPB (premix), 4.5 g, Intravenous, Q8H, Anthony Ventura MD, 4.5 g at 01/09/19 0401  •  [COMPLETED] Insert peripheral IV, , , Once **AND** sodium chloride 0.9 % flush 10 mL, 10 mL, Intravenous, PRN, Nathaniel Masterson PA  •  sodium chloride 0.9 % flush 3 mL, 3 mL, Intravenous, Q12H, Fauzia Avila PA-C  •  sodium chloride 0.9 % flush 3-10 mL, 3-10 mL, Intravenous, PRN, Fauzia Avila PA-C  •  Sodium chloride 0.9 % infusion, 100 mL/hr, Intravenous, Continuous, Fauzia Avila PA-C, Last Rate: 100 mL/hr at 01/09/19 0833, 100 mL/hr at 01/09/19 0833    Past Surgical History:   • TONSILLECTOMY   • VASECTOMY     Family History  History reviewed. No pertinent family history.    Social History     Socioeconomic History   • Marital status:      Spouse name: Not on file   • Number of children: Not on file   • Years of education: Not on file   • Highest education level: Not on file   Social Needs   • Financial resource strain: Not on file   • Food insecurity - worry: Not on file   • Food insecurity - inability: Not on file   • Transportation needs - medical: Not on file   • Transportation needs - non-medical: Not on file   Occupational History   • Not on file   Tobacco Use   • Smoking status: Light Tobacco Smoker     Types: Cigarettes     Start date: 1/8/2004   • Smokeless tobacco: Former User   • Tobacco comment: occasionally    Substance and Sexual  "Activity   • Alcohol use: Yes     Alcohol/week: 1.2 oz     Types: 2 Cans of beer per week   • Drug use: No   • Sexual activity: Not on file   Other Topics Concern   • Not on file   Social History Narrative   • Not on file       Review of Systems:  Review of Systems   Constitutional: Positive for chills and fever.   HENT: Negative.    Eyes: Negative.    Respiratory: Negative.    Cardiovascular: Negative.    Gastrointestinal: Positive for abdominal pain and diarrhea. Negative for nausea and vomiting.   Endocrine: Negative.    Genitourinary: Negative.    Musculoskeletal: Negative.    Skin: Negative.    Allergic/Immunologic: Negative.    Neurological: Negative.    Hematological: Negative.    Psychiatric/Behavioral: Negative.        /70 (BP Location: Left arm, Patient Position: Lying)   Pulse 83   Temp 98.6 °F (37 °C) (Oral)   Resp 20   Ht 181.6 cm (71.5\")   Wt 95.3 kg (210 lb)   SpO2 98%   BMI 28.88 kg/m²   Body mass index is 28.88 kg/m².    General: NAD, AAO x 3   Eyes:  PER, no icterus, and normal sclera.  Ears, Nose, Mouth, & Throat:  Normal appearance of ears.  Nares patent.  Nasal mucosa, septum, and turbinates normal.  Mucous membranes moist without exudate.  Neck supple without adenopathy.   Cardiovascular: Regular rate and rhythm without murmurs, rubs, or gallops.  Palpable pedal pulses.  Extremities warm and well perfused.  No edema.    Respiratory: Clear to auscultation bilaterally without rales, rhonchi, or wheezes.  Gastrointestinal: Soft, mild TTP in LLQ without rebound or guarding, ND.  Neurologic: CN II - XII grossly intact.  No focal neuro deficits.  Skin: No obvious rashes or worrisome lesions noted.      CBC  Results from last 7 days   Lab Units  01/09/19   0454   WBC 10*3/mm3  12.35*   HEMOGLOBIN g/dL  12.1*   HEMATOCRIT %  36.6*   PLATELETS 10*3/mm3  330       CMP  Results from last 7 days   Lab Units  01/09/19   0454  01/08/19   1844   SODIUM mmol/L  135  133   POTASSIUM mmol/L  3.6  " "3.8   CHLORIDE mmol/L  105  98*   CO2 mmol/L  22.0  26.0   BUN mg/dL  8*  11   CREATININE mg/dL  0.69  0.78   CALCIUM mg/dL  8.3*  9.0   BILIRUBIN mg/dL   --   0.6   ALK PHOS U/L   --   84   ALT (SGPT) U/L   --   28   AST (SGOT) U/L   --   21   GLUCOSE mg/dL  84  78       Radiology  EXAMINATION: CT ABDOMEN PELVIS W WO CONTRAST-      INDICATION: LLQ pain, suspect diverticulitis; R10.32-Left lower quadrant  pain         TECHNIQUE: CT scan of the abdomen and pelvis was performed with without  intravenous contrast.     The radiation dose reduction device was turned on for each scan per the  ALARA (As Low as Reasonably Achievable) protocol.     COMPARISON: NONE     FINDINGS: The most superior images demonstrate no basilar inflammatory  process or pleural effusion. The liver and spleen are normal. There is  no adrenal mass. The pancreas is normal. There is no renal mass, stone  or obstruction. There is no ascites or retroperitoneal lymphadenopathy.  There is marked inflammation of the sigmoid colon with multiple  diverticuli, luminal narrowing and extensive pericolic \"stranding\".  Additionally there is a 6 cm fluid collection just superior to the area  sigmoid diverticulitis. There is mild microperforation, contained.     IMPRESSION:  There is marked inflammatory change involving the sigmoid  colon with a 6 cm fluid collection just superior to the sigmoid colon  consistent with acute diverticulitis and abscess formation.         Assessment:  Mr. Hayden is a 38-year-old male with no significant medical history who is seen in consultation for diverticulitis with an abscess.  I have spoken to Dr. Ventura and both Dr. Salinas and Dr. Faith with radiology.  At this point, the plan will be attempted percutaneous drainage versus aspiration of the collection tomorrow.  The patient may be started on a clear liquid diet and advanced as tolerated.  He does not need to be NPO for the procedure, but he should have a light breakfast " tomorrow morning.  He should continue on intravenous antibiotics for now.    Plan:  - Clear liquid diet; Advance as tolerated  - Plan for percutaneous drainage versus aspiration of the fluid collection tomorrow  - Will follow      Evita Iyer MD  01/09/19  11:52 AM

## 2019-01-10 ENCOUNTER — APPOINTMENT (OUTPATIENT)
Dept: CT IMAGING | Facility: HOSPITAL | Age: 39
End: 2019-01-10

## 2019-01-10 VITALS
TEMPERATURE: 98.8 F | RESPIRATION RATE: 16 BRPM | HEART RATE: 79 BPM | OXYGEN SATURATION: 98 % | SYSTOLIC BLOOD PRESSURE: 114 MMHG | DIASTOLIC BLOOD PRESSURE: 77 MMHG | HEIGHT: 71 IN | WEIGHT: 210 LBS | BODY MASS INDEX: 29.4 KG/M2

## 2019-01-10 LAB
APTT PPP: 28.2 SECONDS (ref 24–31)
INR PPP: 1.08 (ref 0.91–1.09)
PROTHROMBIN TIME: 11.3 SECONDS (ref 9.6–11.5)

## 2019-01-10 PROCEDURE — 75989 ABSCESS DRAINAGE UNDER X-RAY: CPT

## 2019-01-10 PROCEDURE — 87077 CULTURE AEROBIC IDENTIFY: CPT | Performed by: SURGERY

## 2019-01-10 PROCEDURE — 87186 SC STD MICRODIL/AGAR DIL: CPT | Performed by: SURGERY

## 2019-01-10 PROCEDURE — 87205 SMEAR GRAM STAIN: CPT | Performed by: SURGERY

## 2019-01-10 PROCEDURE — 25010000002 PIPERACILLIN SOD-TAZOBACTAM PER 1 G: Performed by: INTERNAL MEDICINE

## 2019-01-10 PROCEDURE — 99239 HOSP IP/OBS DSCHRG MGMT >30: CPT | Performed by: INTERNAL MEDICINE

## 2019-01-10 PROCEDURE — C1729 CATH, DRAINAGE: HCPCS

## 2019-01-10 PROCEDURE — 87075 CULTR BACTERIA EXCEPT BLOOD: CPT | Performed by: SURGERY

## 2019-01-10 PROCEDURE — 85730 THROMBOPLASTIN TIME PARTIAL: CPT | Performed by: SURGERY

## 2019-01-10 PROCEDURE — 87070 CULTURE OTHR SPECIMN AEROBIC: CPT | Performed by: SURGERY

## 2019-01-10 PROCEDURE — 0W9G3ZX DRAINAGE OF PERITONEAL CAVITY, PERCUTANEOUS APPROACH, DIAGNOSTIC: ICD-10-PCS | Performed by: RADIOLOGY

## 2019-01-10 PROCEDURE — 36415 COLL VENOUS BLD VENIPUNCTURE: CPT | Performed by: SURGERY

## 2019-01-10 PROCEDURE — 85610 PROTHROMBIN TIME: CPT | Performed by: SURGERY

## 2019-01-10 PROCEDURE — 25010000002 ERTAPENEM PER 500 MG: Performed by: INTERNAL MEDICINE

## 2019-01-10 RX ORDER — LIDOCAINE HYDROCHLORIDE 10 MG/ML
10 INJECTION, SOLUTION INFILTRATION; PERINEURAL ONCE
Status: COMPLETED | OUTPATIENT
Start: 2019-01-10 | End: 2019-01-10

## 2019-01-10 RX ADMIN — TAZOBACTAM SODIUM AND PIPERACILLIN SODIUM 4.5 G: 500; 4 INJECTION, SOLUTION INTRAVENOUS at 03:31

## 2019-01-10 RX ADMIN — ACETAMINOPHEN 650 MG: 325 TABLET ORAL at 01:31

## 2019-01-10 RX ADMIN — SODIUM CHLORIDE 1 G: 900 INJECTION INTRAVENOUS at 10:02

## 2019-01-10 RX ADMIN — SODIUM CHLORIDE 100 ML/HR: 9 INJECTION, SOLUTION INTRAVENOUS at 04:27

## 2019-01-10 RX ADMIN — LIDOCAINE HYDROCHLORIDE 10 ML: 10 INJECTION, SOLUTION INFILTRATION; PERINEURAL at 09:10

## 2019-01-10 NOTE — DISCHARGE SUMMARY
[facility] Medicine Services  DISCHARGE SUMMARY    Patient Name: Arie Hayden  : 1980  MRN: [id number]    Date of Admission: 2019  Date of Discharge:  1/10/2019  Primary Care Physician: Maira Dawkins PA-C    Consults     Date and Time Order Name Status Description    2019 2203 Inpatient Colorectal Surgery Consult Completed     2019 2158 Inpatient Infectious Diseases Consult Completed           Hospital Course     Presenting Problem:   Abscess of sigmoid colon due to diverticulitis [K57.20]    Active Hospital Problems    Diagnosis Date Noted   • **Sepsis  [A41.9] 2019   • Tobacco abuse [Z72.0] 2019   • Abscess of sigmoid colon due to diverticulitis [K57.20] 2019      Resolved Hospital Problems   No resolved problems to display.        Hospital Course:  Arie Hayden is a 38 y.o. male relatively healthy other than hx of tobacco abuse, p/w 10 days of progressively worsening LLQ abd pain with associated diarrhea and fever found to be septic sec to acute sigmoid diverticulitis with abscess. General surgery was also consulted he is s/p CT guided percutaneous drainage of abscess, cultures were sent.Patient was started on broad spec abx, ID was consulted, plan to continue IV abx as outpatient. He ws extensively counseled on tobacco cessation considering the above.    Discharge Follow Up Recommendations for labs/diagnostics:  F/u in ID clinic for ourpatient abx    Day of Discharge     HPI: No acute events overnight, patient states that he feels fine, had his procedure this morning, ready to go home.    Review of Systems  Gen- No fevers, chills  CV- No chest pain, palpitations  Resp- No cough, dyspnea  GI- No N/V/D, +abd pain    Otherwise ROS is negative except as mentioned in the HPI.    Vital Signs:   Temp:  [98.2 °F (36.8 °C)-99.5 °F (37.5 °C)] 98.8 °F (37.1 °C)  Heart Rate:  [67-86] 79  Resp:  [16-20] 16  BP: (102-121)/(57-86) 114/77     Physical  Exam:  Constitutional: No acute distress, awake, alert  HENT: NCAT, mucous membranes moist  Respiratory: Clear to auscultation bilaterally, respiratory effort normal   Cardiovascular: RRR, no murmurs, rubs, or gallops, palpable pedal pulses bilaterally  Gastrointestinal: Positive bowel sounds, soft, nontender, non distended, incision site CDI  Musculoskeletal: No bilateral ankle edema  Psychiatric: Appropriate affect, cooperative  Neurologic: Oriented x 3,no focal deficits  Skin: No rashes    Pertinent  and/or Most Recent Results     Results from last 7 days   Lab Units  01/09/19   0454  01/08/19   1844  01/08/19   1610   WBC 10*3/mm3  12.35*  12.90*  14.57*   HEMOGLOBIN g/dL  12.1*  13.1  13.9   HEMATOCRIT %  36.6*  39.7  42.5   PLATELETS 10*3/mm3  330  343  384   SODIUM mmol/L  135  133  135   POTASSIUM mmol/L  3.6  3.8  4.2   CHLORIDE mmol/L  105  98*  99   CO2 mmol/L  22.0  26.0  26.0   BUN mg/dL  8*  11  11   CREATININE mg/dL  0.69  0.78  0.82   GLUCOSE mg/dL  84  78  83   CALCIUM mg/dL  8.3*  9.0  8.8     Results from last 7 days   Lab Units  01/10/19   0424  01/08/19   1844  01/08/19   1610   BILIRUBIN mg/dL   --   0.6  0.6   ALK PHOS U/L   --   84  90   ALT (SGPT) U/L   --   28  30   AST (SGOT) U/L   --   21  23   PROTIME Seconds  11.3   --    --    INR   1.08   --    --    APTT seconds  28.2   --    --            Invalid input(s): TG, LDLCALC, LDLREALC        Brief Urine Lab Results  (Last result in the past 365 days)      Color   Clarity   Blood   Leuk Est   Nitrite   Protein   CREAT   Urine HCG        01/08/19 1846 Yellow Clear Negative Negative Negative Negative               Microbiology Results Abnormal     None          Imaging Results (all)     None           Order Current Status    Anaerobic Culture - Aspirate, Pelvis In process    Wound Culture - Aspirate, Pelvis In process        Discharge Details        Discharge Medications      New Medications      Instructions Start Date   ertapenem 1 gm/100ml  solution IV  Commonly known as:  INVanz   1 g, Intravenous, Every 24 Hours         Continue These Medications      Instructions Start Date   RHOFADE 1 % cream  Generic drug:  Oxymetazoline HCl   1 application, Apply externally, Every Morning             Allergies   Allergen Reactions   • Pollen Extract Other (See Comments)     Sinus related     Discharge Disposition:Home      Discharge Diet:  Diet Order   Procedures   • Diet Clear Liquid     Discharge Activity:   Activity Instructions     Activity as Tolerated            Special Instructions:None    CODE STATUS:    Code Status and Medical Interventions:   Ordered at: 01/08/19 2101     Level Of Support Discussed With:    Patient     Code Status:    CPR     Medical Interventions (Level of Support Prior to Arrest):    Full         Future Appointments   Date Time Provider Department Center   1/23/2019  9:30 AM Maira Dawkins PA-C [facility]   8/26/2019  9:00 AM Maira Dawkins PA-C [facility]     Additional Instructions for the Follow-ups that You Need to Schedule     Discharge Follow-up with PCP   As directed       Currently Documented PCP:    Maira Dawkins PA-C    PCP Phone Number:    [phone]     Follow Up Details:  1-2 Weeks         Discharge Follow-up with Specialty: ID clinic; 1 Day   As directed      Specialty:  ID clinic    Follow Up:  1 Day             Time Spent on Discharge:  40 minutes    Electronically signed by Agapito Falcon MD, 01/10/19, 11:46 AM.

## 2019-01-10 NOTE — PLAN OF CARE
Problem: Patient Care Overview  Goal: Plan of Care Review  Outcome: Ongoing (interventions implemented as appropriate)   01/10/19 0419   Coping/Psychosocial   Plan of Care Reviewed With patient   Plan of Care Review   Progress improving   OTHER   Outcome Summary Patient c/o LLQ pain and requested Tylenol twice. Low grade temp. Plan for CT guided abscess drain today.       Problem: Pain, Acute (Adult)  Goal: Identify Related Risk Factors and Signs and Symptoms  Outcome: Ongoing (interventions implemented as appropriate)   01/10/19 0419   Pain, Acute (Adult)   Related Risk Factors (Acute Pain) patient perception;disease process   Signs and Symptoms (Acute Pain) verbalization of pain descriptors     Goal: Acceptable Pain Control/Comfort Level  Outcome: Ongoing (interventions implemented as appropriate)   01/10/19 0419   Pain, Acute (Adult)   Acceptable Pain Control/Comfort Level making progress toward outcome       Problem: Infection, Risk/Actual (Adult)  Goal: Identify Related Risk Factors and Signs and Symptoms  Outcome: Ongoing (interventions implemented as appropriate)   01/10/19 0419   Infection, Risk/Actual (Adult)   Related Risk Factors (Infection, Risk/Actual) chronic illness/condition   Signs and Symptoms (Infection, Risk/Actual) body temperature changes;lab value changes;pain     Goal: Infection Prevention/Resolution  Outcome: Ongoing (interventions implemented as appropriate)   01/10/19 0419   Infection, Risk/Actual (Adult)   Infection Prevention/Resolution making progress toward outcome

## 2019-01-10 NOTE — NURSING NOTE
Abscess drained by Dr Orellana.  60 ml sánchez, foul smelling fluid removed and sent for cultures.  Drain removed by Dr Orellana.  Patient tolerated well. Report called to 5H.

## 2019-01-10 NOTE — PROGRESS NOTES
"General Surgery Daily Progress Note    Subjective:  CT guided aspiration yiedled ~ 60 mL of purulent fluid.  No issues overnight.    Objective:  /77 (BP Location: Left arm)   Pulse 79   Temp 98.8 °F (37.1 °C) (Oral)   Resp 16   Ht 181.6 cm (71.5\")   Wt 95.3 kg (210 lb)   SpO2 98%   BMI 28.88 kg/m²     Physical Exam:  General: NAD, AAO x 3   Abdomen:  Soft, NT, ND.  Dressing intact.      Imaging Results (last 24 hours)     Procedure Component Value Units Date/Time    CT Guided Abscess Drain Peritoneal [[id number]] Collected:  01/10/19 1107     Updated:  01/10/19 1107    Narrative:       EXAMINATION: CT GUIDED ABSCESS DRAIN PERITONEAL-      INDICATION: Percutaneous drainage versus aspiration of abdominal fluid  collection; K57.20-Diverticulitis of large intestine with perforation  and abscess without bleeding.     TECHNIQUE: Using CT guidance, local anesthesia and sterile technique, an  8.5 [place]alan catheter was inserted into a pelvic abscess thought to be  related to diverticulitis.     The radiation dose reduction device was turned on for each scan per the  ALARA (As Low as Reasonably Achievable) protocol.     COMPARISON: None.     FINDINGS: The catheter was well positioned without difficulty and  yielded 60 cc of grossly purulent material. There was no residual fluid  and it was elected to remove the catheter. A specimen was sent to the  laboratory as per request.  No complications were encountered.       Impression:       60 cc of grossly purulent material was aspirated from a  pelvic abscess using an 8.5 [place] catheter. The fluid collection was  completely evacuated with residual underlying inflammatory change. It  was elected to remove the catheter. Specimen sent for laboratory as per  request. No complications were encountered.     D:  01/10/2019  E:  01/10/2019             CBC:  Results from last 7 days   Lab Units  01/09/19   0454   WBC 10*3/mm3  12.35*   HEMOGLOBIN g/dL  12.1*   HEMATOCRIT %  " 36.6*   PLATELETS 10*3/mm3  330       CMP:  Results from last 7 days   Lab Units  01/09/19   0454  01/08/19   1844   SODIUM mmol/L  135  133   POTASSIUM mmol/L  3.6  3.8   CHLORIDE mmol/L  105  98*   CO2 mmol/L  22.0  26.0   BUN mg/dL  8*  11   CREATININE mg/dL  0.69  0.78   CALCIUM mg/dL  8.3*  9.0   BILIRUBIN mg/dL   --   0.6   ALK PHOS U/L   --   84   ALT (SGPT) U/L   --   28   AST (SGOT) U/L   --   21   GLUCOSE mg/dL  84  78         Assessment  HD #2 for 38 year old male admitted with perforated diverticulitis with abscess s/p percutaneous aspiration of abscess.  The patient is doing well.  He may follow-up with ID for intravenous antibiotics.  He will not require surgical follow-up, given his close ID follow-up; however, I have provided the patient with my card should additional needs or concerns arise.  He will require a colonoscopy in several months, and I have shared this plan with Dr. Ventura.  Will sign off.    Plan:   - Outpatient ID follow-up   - Will sign off    Evita Iyer MD - 1/10/2019, 12:44 PM

## 2019-01-10 NOTE — PROGRESS NOTES
Continued Stay Note  [facility]     Patient Name: Arie Hayden  MRN: [id number]  Today's Date: 1/10/2019    Admit Date: 1/8/2019    Discharge Plan     Row Name 01/10/19 1201       Plan    Plan  Home with outpatient IV antibiotics    Patient/Family in Agreement with Plan  yes    Plan Comments  Met with patient at bedside. Planning for discharge home today. [facility] has appt scheduled for him with Dr. Ventura on Friday, 1/11 at 2:00 PM and patient is aware. He denies further discharge planning needs or concerns. Aiyana Armstrong RN [phone]    Final Discharge Disposition Code  01 - home or self-care        Discharge Codes    No documentation.       Expected Discharge Date and Time     Expected Discharge Date Expected Discharge Time    [place] 10, 2019             Aiyana Armstrong RN

## 2019-01-10 NOTE — PROGRESS NOTES
"[facility] Progress Note    Admission Date: 2019    Arie Hayden  1980  [id number]    Date: 1/10/2019    Meds:    Anti-Infectives (From admission, onward)    Ordered     Dose/Rate Route Frequency Start Stop    01/10/19 0747  ertapenem (INVanz) 1 g/100 mL 0.9% NS VTB (mbp)     Ordering Provider:  Anthony Ventura MD    1 g  over 30 Minutes Intravenous Every 24 Hours 01/10/19 0900 19 0859    19 1829  piperacillin-tazobactam (ZOSYN) 4.5 g in iso-osmotic dextrose 100 mL IVPB (premix)     Ordering Provider:  Nathaniel Masterson PA    4.5 g  over 30 Minutes Intravenous Once 19 1831 19 2140          CC  Diverticulitis with diverticular abscess      SUBJECTIVE: 19:  Patient is a 38 y.o. male who is seen this evening for evaluation of diverticulitis with diverticular abscess.  He has been ill for approximately 10 days with intermittent but progressively worsening lower abdominal pain.  Over the last 2 days the pain is been more severe.  This has been accompanied by chills and drenching sweats without documented fevers.  He was seen by his primary care provider today and a CT scan was ordered late this afternoon which revealed moderately severe sigmoid diverticulitis with a 6 cm diverticular abscess.  He has been given intravenous Zosyn in the emergency room.  He denies nausea, vomiting, and rectal pain.  He has no prior history of diverticulitis.  19:  Possible CT guided drainage today if amenable.  He feels \"pretty good\". No nausea.  He has remained afebrile.  1/10/19: His abdominal pain controlled with Tylenol.  No nausea diarrhea. Having CT guided drainage today.  He has remained afebrile.          PE:   Vital Signs  Temp (24hrs), Av °F (37.2 °C), Min:98.2 °F (36.8 °C), Max:99.5 °F (37.5 °C)    Temp  Min: 98.2 °F (36.8 °C)  Max: 99.5 °F (37.5 °C)  BP  Min: 104/57  Max: 121/77  Pulse  Min: 67  Max: 86  Resp  Min: 18  Max: 20  SpO2  Min: 90 %  Max: 98 %    GENERAL: Awake and alert, " in no acute distress.   HEENT:  No conjunctival injection. No icterus. Oropharynx clear without evidence of thrush or exudate.  NECK: Supple, no JVD     HEART: RRR; No murmur, rubs, gallops.   LUNGS: Clear to auscultation bilaterally without wheezing, rales, rhonchi. Normal respiratory effort. Nonlabored. No dullness.  ABDOMEN: Soft, mildly  tender, nondistended. Positive bowel sounds. No rebound or guarding. NO mass or HSM.  EXT:  No cyanosis, clubbing or edema. No cord.  : Genitalia generally unremarkable.  Without Almonte catheter.  SKIN: Warm and dry without cutaneous eruptions on Inspection/palpation.    NEURO: Alert and oriented x 3, Motor 5/5 bilaterally    Laboratory Data    Results from last 7 days   Lab Units  01/09/19   0454  01/08/19   1844  01/08/19   1610   WBC 10*3/mm3  12.35*  12.90*  14.57*   HEMOGLOBIN g/dL  12.1*  13.1  13.9   HEMATOCRIT %  36.6*  39.7  42.5   PLATELETS 10*3/mm3  330  343  384     Results from last 7 days   Lab Units  01/09/19   0454   SODIUM mmol/L  135   POTASSIUM mmol/L  3.6   CHLORIDE mmol/L  105   CO2 mmol/L  22.0   BUN mg/dL  8*   CREATININE mg/dL  0.69   GLUCOSE mg/dL  84   CALCIUM mg/dL  8.3*     Results from last 7 days   Lab Units  01/08/19   1844   ALK PHOS U/L  84   BILIRUBIN mg/dL  0.6   ALT (SGPT) U/L  28   AST (SGOT) U/L  21             Results from last 7 days   Lab Units  01/08/19   1844   LACTATE mmol/L  0.7             Estimated Creatinine Clearance: 170.8 mL/min (by C-G formula based on SCr of 0.69 mg/dL).    Microbiology:                            Radiology:  Imaging Results (last 72 hours)     ** No results found for the last 72 hours. **          I personally read the radiographic studies     IMPRESSION:  1.  Sigmoid diverticulitis/diverticular abscess-he has extensive sigmoid diverticulitis with a large diverticular abscess.   We will proceed with CT-guided abscess drainage today.  I will switch his antibiotic therapy to intravenous Invanz and I think we  will be able to discharge him later today with acute outpatient care in our office.  2.  Leukocytosis/neutrophilia  3.  Abdominal pain-secondary to sigmoid diverticulitis         RECOMMENDATIONS:  1.  Discontinue intravenous Zosyn  2.  CT-guided abscess drainage with fluid to be sent for gram stain, aerobic culture, and anaerobic culture  3.  Discharge today after the CT-guided abscess drainage procedure  4.  Invanz 1 g IV daily pending culture data    Dr. Ventura has obtained the history, performed the physical exam and formulated the above treatment plan.     UM/NNEKA: I discussed his complex situation with him and his wife in detail today.  I will plan for him to discharge later today.  I coordinated his care.     OUTPATIENT IV ANTIBIOTICS:  1. Invanz 1g IV daily in our office via peripheral IV  2. Appt with Dr. Ventura Friday 1/11/19 at 2p  appt has been made       Anthony Ventura MD  1/10/2019  8:59 AM

## 2019-01-11 ENCOUNTER — TRANSCRIBE ORDERS (OUTPATIENT)
Dept: ADMINISTRATIVE | Facility: HOSPITAL | Age: 39
End: 2019-01-11

## 2019-01-11 ENCOUNTER — TRANSITIONAL CARE MANAGEMENT TELEPHONE ENCOUNTER (OUTPATIENT)
Dept: FAMILY MEDICINE CLINIC | Facility: CLINIC | Age: 39
End: 2019-01-11

## 2019-01-11 DIAGNOSIS — K57.20 PERFORATED DIVERTICULUM OF LARGE INTESTINE: Primary | ICD-10-CM

## 2019-01-11 NOTE — OUTREACH NOTE
"NNEKA call completed. Please see flow sheet for additional details.  Pt reports he's doing \"fine\", though he did have night sweats/chills last night.  He did not check temp. Otherwise, he denies abdominal pain other than minor soreness.  No diarhea, no N/V. Eating/drinking well. Has ID appt for IV Invanz today, then QD x2 wks. Also confirms 1/17/19 NNEKA.  "

## 2019-01-13 LAB
BACTERIA SPEC AEROBE CULT: ABNORMAL
BACTERIA SPEC AEROBE CULT: ABNORMAL
GRAM STN SPEC: ABNORMAL
GRAM STN SPEC: ABNORMAL

## 2019-01-14 ENCOUNTER — LAB (OUTPATIENT)
Dept: LAB | Facility: HOSPITAL | Age: 39
End: 2019-01-14

## 2019-01-14 ENCOUNTER — TRANSCRIBE ORDERS (OUTPATIENT)
Dept: LAB | Facility: HOSPITAL | Age: 39
End: 2019-01-14

## 2019-01-14 ENCOUNTER — HOSPITAL ENCOUNTER (OUTPATIENT)
Dept: INFUSION THERAPY | Facility: HOSPITAL | Age: 39
Discharge: HOME | End: 2019-01-14
Attending: INTERNAL MEDICINE | Admitting: INTERNAL MEDICINE

## 2019-01-14 VITALS
BODY MASS INDEX: 27.3 KG/M2 | HEART RATE: 79 BPM | HEIGHT: 73 IN | TEMPERATURE: 98.1 F | RESPIRATION RATE: 16 BRPM | OXYGEN SATURATION: 97 % | SYSTOLIC BLOOD PRESSURE: 117 MMHG | WEIGHT: 206 LBS | DIASTOLIC BLOOD PRESSURE: 78 MMHG

## 2019-01-14 DIAGNOSIS — K57.20 PERFORATED DIVERTICULUM OF LARGE INTESTINE: ICD-10-CM

## 2019-01-14 DIAGNOSIS — D72.823 NEUTROPHILIC LEUKEMOID REACTION: ICD-10-CM

## 2019-01-14 DIAGNOSIS — K57.20 PERFORATED DIVERTICULUM OF LARGE INTESTINE: Primary | ICD-10-CM

## 2019-01-14 LAB
ALBUMIN SERPL-MCNC: 3.83 G/DL (ref 3.2–4.8)
ALBUMIN/GLOB SERPL: 1.3 G/DL (ref 1.5–2.5)
ALP SERPL-CCNC: 65 U/L (ref 25–100)
ALT SERPL W P-5'-P-CCNC: 23 U/L (ref 7–40)
ANION GAP SERPL CALCULATED.3IONS-SCNC: 7 MMOL/L (ref 3–11)
AST SERPL-CCNC: 19 U/L (ref 0–33)
BASOPHILS # BLD AUTO: 0.04 10*3/MM3 (ref 0–0.2)
BASOPHILS NFR BLD AUTO: 0.3 % (ref 0–1)
BILIRUB SERPL-MCNC: 0.4 MG/DL (ref 0.3–1.2)
BUN BLD-MCNC: 11 MG/DL (ref 9–23)
BUN/CREAT SERPL: 12 (ref 7–25)
CALCIUM SPEC-SCNC: 9.1 MG/DL (ref 8.7–10.4)
CHLORIDE SERPL-SCNC: 104 MMOL/L (ref 99–109)
CO2 SERPL-SCNC: 28 MMOL/L (ref 20–31)
CREAT BLD-MCNC: 0.92 MG/DL (ref 0.6–1.3)
CRP SERPL-MCNC: 9.76 MG/DL (ref 0–1)
DEPRECATED RDW RBC AUTO: 45.2 FL (ref 37–54)
EOSINOPHIL # BLD AUTO: 0.12 10*3/MM3 (ref 0–0.3)
EOSINOPHIL NFR BLD AUTO: 1 % (ref 0–3)
ERYTHROCYTE [DISTWIDTH] IN BLOOD BY AUTOMATED COUNT: 12.8 % (ref 11.3–14.5)
ERYTHROCYTE [SEDIMENTATION RATE] IN BLOOD: 72 MM/HR (ref 0–15)
GFR SERPL CREATININE-BSD FRML MDRD: 92 ML/MIN/1.73
GLOBULIN UR ELPH-MCNC: 3 GM/DL
GLUCOSE BLD-MCNC: 87 MG/DL (ref 70–100)
HCT VFR BLD AUTO: 42 % (ref 38.9–50.9)
HGB BLD-MCNC: 13.6 G/DL (ref 13.1–17.5)
IMM GRANULOCYTES # BLD AUTO: 0.03 10*3/MM3 (ref 0–0.03)
IMM GRANULOCYTES NFR BLD AUTO: 0.3 % (ref 0–0.6)
LYMPHOCYTES # BLD AUTO: 1.84 10*3/MM3 (ref 0.6–4.8)
LYMPHOCYTES NFR BLD AUTO: 15.7 % (ref 24–44)
MCH RBC QN AUTO: 30.8 PG (ref 27–31)
MCHC RBC AUTO-ENTMCNC: 32.4 G/DL (ref 32–36)
MCV RBC AUTO: 95 FL (ref 80–99)
MONOCYTES # BLD AUTO: 0.75 10*3/MM3 (ref 0–1)
MONOCYTES NFR BLD AUTO: 6.4 % (ref 0–12)
NEUTROPHILS # BLD AUTO: 9 10*3/MM3 (ref 1.5–8.3)
NEUTROPHILS NFR BLD AUTO: 76.6 % (ref 41–71)
PLATELET # BLD AUTO: 646 10*3/MM3 (ref 150–450)
PMV BLD AUTO: 10.4 FL (ref 6–12)
POTASSIUM BLD-SCNC: 4.6 MMOL/L (ref 3.5–5.5)
PROT SERPL-MCNC: 6.8 G/DL (ref 5.7–8.2)
RBC # BLD AUTO: 4.42 10*6/MM3 (ref 4.2–5.76)
SODIUM BLD-SCNC: 139 MMOL/L (ref 132–146)
WBC NRBC COR # BLD: 11.75 10*3/MM3 (ref 3.5–10.8)

## 2019-01-14 PROCEDURE — 85025 COMPLETE CBC W/AUTO DIFF WBC: CPT

## 2019-01-14 PROCEDURE — 86140 C-REACTIVE PROTEIN: CPT

## 2019-01-14 PROCEDURE — 85652 RBC SED RATE AUTOMATED: CPT

## 2019-01-14 PROCEDURE — 36415 COLL VENOUS BLD VENIPUNCTURE: CPT

## 2019-01-14 PROCEDURE — 80053 COMPREHEN METABOLIC PANEL: CPT

## 2019-01-14 PROCEDURE — C1894 INTRO/SHEATH, NON-LASER: HCPCS

## 2019-01-14 PROCEDURE — C1751 CATH, INF, PER/CENT/MIDLINE: HCPCS

## 2019-01-14 RX ORDER — SODIUM CHLORIDE 0.9 % (FLUSH) 0.9 %
10 SYRINGE (ML) INJECTION EVERY 12 HOURS SCHEDULED
Status: DISCONTINUED | OUTPATIENT
Start: 2019-01-14 | End: 2019-01-17 | Stop reason: HOSPADM

## 2019-01-14 RX ORDER — SODIUM CHLORIDE 0.9 % (FLUSH) 0.9 %
10 SYRINGE (ML) INJECTION AS NEEDED
Status: DISCONTINUED | OUTPATIENT
Start: 2019-01-14 | End: 2019-01-17 | Stop reason: HOSPADM

## 2019-01-14 NOTE — PROGRESS NOTES
Pt discharged from IR department s/p picc line placement.  Pt tolerated procedure without complications.  Education provided by [facility] nurses.  Pt left IR dept to head to [facility] office for infusion and further instructions.

## 2019-01-14 NOTE — ADDENDUM NOTE
Encounter addended by: Rosemary Belcher RN on: 1/14/2019 1:47 PM   Actions taken: Sign clinical note

## 2019-01-22 ENCOUNTER — TRANSCRIBE ORDERS (OUTPATIENT)
Dept: ADMINISTRATIVE | Facility: HOSPITAL | Age: 39
End: 2019-01-22

## 2019-01-22 ENCOUNTER — LAB (OUTPATIENT)
Dept: LAB | Facility: HOSPITAL | Age: 39
End: 2019-01-22

## 2019-01-22 ENCOUNTER — TRANSCRIBE ORDERS (OUTPATIENT)
Dept: LAB | Facility: HOSPITAL | Age: 39
End: 2019-01-22

## 2019-01-22 DIAGNOSIS — D72.823 NEUTROPHILIC LEUKEMOID REACTION: ICD-10-CM

## 2019-01-22 DIAGNOSIS — K57.20 PERFORATED DIVERTICULUM OF LARGE INTESTINE: ICD-10-CM

## 2019-01-22 DIAGNOSIS — K57.20 PERFORATED DIVERTICULUM OF LARGE INTESTINE: Primary | ICD-10-CM

## 2019-01-22 DIAGNOSIS — K57.20 DIVERTICULITIS OF LARGE INTESTINE WITH PERFORATION AND ABSCESS WITHOUT BLEEDING: Primary | ICD-10-CM

## 2019-01-22 LAB
ALBUMIN SERPL-MCNC: 4.02 G/DL (ref 3.2–4.8)
ALBUMIN/GLOB SERPL: 1.4 G/DL (ref 1.5–2.5)
ALP SERPL-CCNC: 85 U/L (ref 25–100)
ALT SERPL W P-5'-P-CCNC: 33 U/L (ref 7–40)
ANION GAP SERPL CALCULATED.3IONS-SCNC: 6 MMOL/L (ref 3–11)
AST SERPL-CCNC: 25 U/L (ref 0–33)
BASOPHILS # BLD AUTO: 0.09 10*3/MM3 (ref 0–0.2)
BASOPHILS NFR BLD AUTO: 0.9 % (ref 0–1)
BILIRUB SERPL-MCNC: 0.5 MG/DL (ref 0.3–1.2)
BUN BLD-MCNC: 11 MG/DL (ref 9–23)
BUN/CREAT SERPL: 12.8 (ref 7–25)
CALCIUM SPEC-SCNC: 9.1 MG/DL (ref 8.7–10.4)
CHLORIDE SERPL-SCNC: 104 MMOL/L (ref 99–109)
CO2 SERPL-SCNC: 30 MMOL/L (ref 20–31)
CREAT BLD-MCNC: 0.86 MG/DL (ref 0.6–1.3)
CRP SERPL-MCNC: 2.46 MG/DL (ref 0–1)
DEPRECATED RDW RBC AUTO: 46 FL (ref 37–54)
EOSINOPHIL # BLD AUTO: 0.08 10*3/MM3 (ref 0–0.3)
EOSINOPHIL NFR BLD AUTO: 0.8 % (ref 0–3)
ERYTHROCYTE [DISTWIDTH] IN BLOOD BY AUTOMATED COUNT: 13.3 % (ref 11.3–14.5)
ERYTHROCYTE [SEDIMENTATION RATE] IN BLOOD: 71 MM/HR (ref 0–15)
GFR SERPL CREATININE-BSD FRML MDRD: 99 ML/MIN/1.73
GLOBULIN UR ELPH-MCNC: 2.9 GM/DL
GLUCOSE BLD-MCNC: 103 MG/DL (ref 70–100)
HCT VFR BLD AUTO: 42.4 % (ref 38.9–50.9)
HGB BLD-MCNC: 13.7 G/DL (ref 13.1–17.5)
IMM GRANULOCYTES # BLD AUTO: 0.01 10*3/MM3 (ref 0–0.03)
IMM GRANULOCYTES NFR BLD AUTO: 0.1 % (ref 0–0.6)
LYMPHOCYTES # BLD AUTO: 1.89 10*3/MM3 (ref 0.6–4.8)
LYMPHOCYTES NFR BLD AUTO: 19.6 % (ref 24–44)
MCH RBC QN AUTO: 30.6 PG (ref 27–31)
MCHC RBC AUTO-ENTMCNC: 32.3 G/DL (ref 32–36)
MCV RBC AUTO: 94.9 FL (ref 80–99)
MONOCYTES # BLD AUTO: 0.6 10*3/MM3 (ref 0–1)
MONOCYTES NFR BLD AUTO: 6.2 % (ref 0–12)
NEUTROPHILS # BLD AUTO: 6.99 10*3/MM3 (ref 1.5–8.3)
NEUTROPHILS NFR BLD AUTO: 72.5 % (ref 41–71)
PLATELET # BLD AUTO: 487 10*3/MM3 (ref 150–450)
PMV BLD AUTO: 10.5 FL (ref 6–12)
POTASSIUM BLD-SCNC: 4.6 MMOL/L (ref 3.5–5.5)
PROT SERPL-MCNC: 6.9 G/DL (ref 5.7–8.2)
RBC # BLD AUTO: 4.47 10*6/MM3 (ref 4.2–5.76)
SODIUM BLD-SCNC: 140 MMOL/L (ref 132–146)
WBC NRBC COR # BLD: 9.65 10*3/MM3 (ref 3.5–10.8)

## 2019-01-22 PROCEDURE — 36415 COLL VENOUS BLD VENIPUNCTURE: CPT

## 2019-01-22 PROCEDURE — 85652 RBC SED RATE AUTOMATED: CPT

## 2019-01-22 PROCEDURE — 85025 COMPLETE CBC W/AUTO DIFF WBC: CPT

## 2019-01-22 PROCEDURE — 80053 COMPREHEN METABOLIC PANEL: CPT

## 2019-01-22 PROCEDURE — 86140 C-REACTIVE PROTEIN: CPT

## 2019-01-23 ENCOUNTER — OFFICE VISIT (OUTPATIENT)
Dept: INTERNAL MEDICINE | Facility: CLINIC | Age: 39
End: 2019-01-23

## 2019-01-23 VITALS
WEIGHT: 205 LBS | DIASTOLIC BLOOD PRESSURE: 70 MMHG | SYSTOLIC BLOOD PRESSURE: 130 MMHG | HEART RATE: 88 BPM | TEMPERATURE: 98.2 F | BODY MASS INDEX: 27.17 KG/M2 | HEIGHT: 73 IN

## 2019-01-23 DIAGNOSIS — K57.20 ABSCESS OF SIGMOID COLON DUE TO DIVERTICULITIS: Primary | ICD-10-CM

## 2019-01-23 PROCEDURE — 99213 OFFICE O/P EST LOW 20 MIN: CPT | Performed by: PHYSICIAN ASSISTANT

## 2019-01-23 NOTE — PROGRESS NOTES
Transitional Care Follow Up Visit  Subjective     Arie AYO Hayden is a 38 y.o. male who presents for a transitional care management visit.    Within 48 business hours after discharge our office contacted him via telephone to coordinate his care and needs.      I reviewed and discussed the details of that call along with the discharge summary, hospital problems, inpatient lab results, inpatient diagnostic studies, and consultation reports with Arie.     Current outpatient and discharge medications have been reconciled for the patient.    Date of TCM Phone Call 1/11/2019   [facility]   Date of Admission 1/8/2019   Date of Discharge 1/10/2019   Discharge Disposition Home or Self Care     Risk for Readmission (LACE) Score: 5 (1/10/2019  6:00 AM)      History of Present Illness   Course During Hospital Stay:  ***     {Common H&P Review Areas:58530}    Review of Systems    Objective   Physical Exam    Assessment/Plan   {Assess/PlanSmartLinks:73653}

## 2019-01-23 NOTE — PROGRESS NOTES
Patient Care Team:  Maira Dawkins PA-C as PCP - General (Internal Medicine)    Chief Complaint;:   Chief Complaint   Patient presents with   • Follow-up     follow up for left lower quadrant pain - hospital follow-up for diverticulitis        Subjective   38-year-old white female presents to the office today for follow-up of his hospitalization for abscess of the sigmoid colon.  He is being treated by Dr. Pagan in infectious disease with IV antibiotics.  The plan is to continue the antibiotics until next week repeat the CT scan and then he follows up with Dr. Pagan..   He states that he is feeling well no fever or chills.  Bowel movements are normal.  And appetite has returned.  He felt so well last week that he went to [place].  He will need a colonoscopy eventually.  HPI    Past Medical History:   Diagnosis Date   • Allergic rhinitis    • Diverticulitis    • Diverticulitis 01/15/2019   • Environmental allergies    • Fatigue    • Snores        Past Surgical History:   Procedure Laterality Date   • TONSILLECTOMY     • VASECTOMY         Family History   Problem Relation Age of Onset   • Factor V Leiden deficiency Father    • Suicidality Father    • Breast cancer Maternal Grandmother    • Lung cancer Maternal Grandfather    • Factor V Leiden deficiency Paternal Grandfather    • Prostate cancer Paternal Grandfather        Social History     Socioeconomic History   • Marital status:      Spouse name: Not on file   • Number of children: Not on file   • Years of education: Not on file   • Highest education level: Not on file   Social Needs   • Financial resource strain: Not on file   • Food insecurity - worry: Not on file   • Food insecurity - inability: Not on file   • Transportation needs - medical: Not on file   • Transportation needs - non-medical: Not on file   Occupational History   • Not on file   Tobacco Use   • Smoking status: Light Tobacco Smoker     Types: Cigarettes     Start date: 1/8/2004  "  • Smokeless tobacco: Former User   • Tobacco comment: occasionally    Substance and Sexual Activity   • Alcohol use: Yes     Alcohol/week: 1.2 oz     Types: 2 Cans of beer per week   • Drug use: No   • Sexual activity: Defer   Other Topics Concern   • Not on file   Social History Narrative   • Not on file       Allergies   Allergen Reactions   • Pollen Extract Other (See Comments)     Sinus related       Review of Systems:     Review of Systems   Constitutional: Negative for chills and fever.   Respiratory: Negative.    Cardiovascular: Negative.    Gastrointestinal: Negative for abdominal distention, abdominal pain, blood in stool, constipation, diarrhea, nausea, GERD and indigestion.       Vital Signs  Vitals:    01/23/19 0947   BP: 130/70   BP Location: Left arm   Patient Position: Sitting   Cuff Size: Adult   Pulse: 88   Temp: 98.2 °F (36.8 °C)   TempSrc: Temporal   Weight: 93 kg (205 lb)   Height: 185.4 cm (72.99\")   PainSc: 0-No pain         Current Outpatient Medications:   •  ertapenem (INVanz) 1 g/100 mL 0.9% NS VTB (mbp), Infuse 100 mL into a venous catheter Daily for 13 doses., Disp: 1300 mL, Rfl: 0  •  RHOFADE 1 % cream, Apply 1 application topically Every Morning., Disp: , Rfl: 2    Physical Exam:    Physical Exam   Constitutional: He is oriented to person, place, and time. He appears well-developed and well-nourished.   Cardiovascular: Normal rate, regular rhythm and normal heart sounds.   Pulmonary/Chest: Effort normal and breath sounds normal.   Abdominal: Soft. Bowel sounds are normal.   Neurological: He is alert and oriented to person, place, and time.   Nursing note and vitals reviewed.      Procedures     Results Review:    I reviewed the patient's new clinical results.    Assessment/Plan   Problem List Items Addressed This Visit        Digestive    Abscess of sigmoid colon due to diverticulitis - Primary        Patient Instructions   Hospital records were reviewed.   Labs done yesterday were " reviewed and discussed with the patient.      Plan of care reviewed with patient at the conclusion of today's visit. Education was provided regarding diagnosis, management, and any prescribed or recommended OTC medications.Patient verbalizes understanding of and agreement with management plan.     Maira Dawkins PA-C

## 2019-01-23 NOTE — PROGRESS NOTES
Patient Care Team:  Maira Dawkins PA-C as PCP - General (Internal Medicine)    Chief Complaint;:   Chief Complaint   Patient presents with   • Follow-up     follow up for left lower quadrant pain - hospital follow-up for diverticulitis        Subjective     HPI    Past Medical History:   Diagnosis Date   • Allergic rhinitis    • Diverticulitis    • Diverticulitis 01/15/2019   • Environmental allergies    • Fatigue    • Snores        Past Surgical History:   Procedure Laterality Date   • TONSILLECTOMY     • VASECTOMY         Family History   Problem Relation Age of Onset   • Factor V Leiden deficiency Father    • Suicidality Father    • Breast cancer Maternal Grandmother    • Lung cancer Maternal Grandfather    • Factor V Leiden deficiency Paternal Grandfather    • Prostate cancer Paternal Grandfather        Social History     Socioeconomic History   • Marital status:      Spouse name: Not on file   • Number of children: Not on file   • Years of education: Not on file   • Highest education level: Not on file   Social Needs   • Financial resource strain: Not on file   • Food insecurity - worry: Not on file   • Food insecurity - inability: Not on file   • Transportation needs - medical: Not on file   • Transportation needs - non-medical: Not on file   Occupational History   • Not on file   Tobacco Use   • Smoking status: Light Tobacco Smoker     Types: Cigarettes     Start date: 1/8/2004   • Smokeless tobacco: Former User   • Tobacco comment: occasionally    Substance and Sexual Activity   • Alcohol use: Yes     Alcohol/week: 1.2 oz     Types: 2 Cans of beer per week   • Drug use: No   • Sexual activity: Defer   Other Topics Concern   • Not on file   Social History Narrative   • Not on file       Allergies   Allergen Reactions   • Pollen Extract Other (See Comments)     Sinus related       Review of Systems:     Review of Systems   Constitutional: Positive for fatigue. Negative for chills and fever.   HENT:  "Negative for congestion, ear pain and sinus pressure.    Respiratory: Negative for cough, chest tightness, shortness of breath and wheezing.    Cardiovascular: Negative for chest pain and palpitations.   Gastrointestinal: Negative for abdominal pain, blood in stool and constipation.   Skin: Negative for color change.   Allergic/Immunologic: Negative for environmental allergies.   Neurological: Negative for dizziness, speech difficulty and headache.   Psychiatric/Behavioral: Negative for decreased concentration. The patient is not nervous/anxious.        Vital Signs  Vitals:    01/23/19 0947   BP: 130/70   BP Location: Left arm   Patient Position: Sitting   Cuff Size: Adult   Pulse: 88   Temp: 98.2 °F (36.8 °C)   TempSrc: Temporal   Weight: 93 kg (205 lb)   Height: 185.4 cm (72.99\")   PainSc: 0-No pain         Current Outpatient Medications:   •  ertapenem (INVanz) 1 g/100 mL 0.9% NS VTB (mbp), Infuse 100 mL into a venous catheter Daily for 13 doses., Disp: 1300 mL, Rfl: 0  •  RHOFADE 1 % cream, Apply 1 application topically Every Morning., Disp: , Rfl: 2    Physical Exam:    Physical Exam    Procedures     Results Review:    I reviewed the patient's new clinical results.    Assessment/Plan   Problem List Items Addressed This Visit     None        There are no Patient Instructions on file for this visit.    Plan of care reviewed with patient at the conclusion of today's visit. Education was provided regarding diagnosis, management, and any prescribed or recommended OTC medications.Patient verbalizes understanding of and agreement with management plan.     Sheila Kirk MA  "

## 2019-01-23 NOTE — PATIENT INSTRUCTIONS
Hospital records were reviewed.   Labs done yesterday were reviewed and discussed with the patient.

## 2019-01-28 ENCOUNTER — HOSPITAL ENCOUNTER (OUTPATIENT)
Dept: CT IMAGING | Facility: HOSPITAL | Age: 39
Discharge: HOME | End: 2019-01-28
Attending: INTERNAL MEDICINE | Admitting: INTERNAL MEDICINE

## 2019-01-28 DIAGNOSIS — K57.20 DIVERTICULITIS OF LARGE INTESTINE WITH PERFORATION AND ABSCESS WITHOUT BLEEDING: ICD-10-CM

## 2019-01-28 PROCEDURE — 0 DIATRIZOATE MEGLUMINE & SODIUM PER 1 ML: Performed by: INTERNAL MEDICINE

## 2019-01-28 PROCEDURE — 72192 CT PELVIS W/O DYE: CPT

## 2019-01-28 RX ADMIN — DIATRIZOATE MEGLUMINE AND DIATRIZOATE SODIUM 15 ML: 660; 100 LIQUID ORAL; RECTAL at 09:15

## 2019-01-29 ENCOUNTER — TRANSCRIBE ORDERS (OUTPATIENT)
Dept: LAB | Facility: HOSPITAL | Age: 39
End: 2019-01-29

## 2019-01-29 ENCOUNTER — LAB (OUTPATIENT)
Dept: LAB | Facility: HOSPITAL | Age: 39
End: 2019-01-29

## 2019-01-29 DIAGNOSIS — K57.20 PERFORATED DIVERTICULUM OF LARGE INTESTINE: Primary | ICD-10-CM

## 2019-01-29 DIAGNOSIS — D72.823 NEUTROPHILIC LEUKEMOID REACTION: ICD-10-CM

## 2019-01-29 DIAGNOSIS — K57.20 PERFORATED DIVERTICULUM OF LARGE INTESTINE: ICD-10-CM

## 2019-01-29 LAB
ALBUMIN SERPL-MCNC: 4.23 G/DL (ref 3.2–4.8)
ALBUMIN/GLOB SERPL: 1.3 G/DL (ref 1.5–2.5)
ALP SERPL-CCNC: 81 U/L (ref 25–100)
ALT SERPL W P-5'-P-CCNC: 36 U/L (ref 7–40)
ANION GAP SERPL CALCULATED.3IONS-SCNC: 9 MMOL/L (ref 3–11)
AST SERPL-CCNC: 33 U/L (ref 0–33)
BASOPHILS # BLD AUTO: 0.03 10*3/MM3 (ref 0–0.2)
BASOPHILS NFR BLD AUTO: 0.5 % (ref 0–1)
BILIRUB SERPL-MCNC: 0.7 MG/DL (ref 0.3–1.2)
BUN BLD-MCNC: 13 MG/DL (ref 9–23)
BUN/CREAT SERPL: 10.2 (ref 7–25)
CALCIUM SPEC-SCNC: 9.3 MG/DL (ref 8.7–10.4)
CHLORIDE SERPL-SCNC: 105 MMOL/L (ref 99–109)
CO2 SERPL-SCNC: 24 MMOL/L (ref 20–31)
CREAT BLD-MCNC: 1.27 MG/DL (ref 0.6–1.3)
CRP SERPL-MCNC: 9.21 MG/DL (ref 0–1)
DEPRECATED RDW RBC AUTO: 47.1 FL (ref 37–54)
EOSINOPHIL # BLD AUTO: 0.05 10*3/MM3 (ref 0–0.3)
EOSINOPHIL NFR BLD AUTO: 0.8 % (ref 0–3)
ERYTHROCYTE [DISTWIDTH] IN BLOOD BY AUTOMATED COUNT: 13.5 % (ref 11.3–14.5)
ERYTHROCYTE [SEDIMENTATION RATE] IN BLOOD: 54 MM/HR (ref 0–15)
GFR SERPL CREATININE-BSD FRML MDRD: 63 ML/MIN/1.73
GLOBULIN UR ELPH-MCNC: 3.2 GM/DL
GLUCOSE BLD-MCNC: 91 MG/DL (ref 70–100)
HCT VFR BLD AUTO: 44.4 % (ref 38.9–50.9)
HGB BLD-MCNC: 15.1 G/DL (ref 13.1–17.5)
IMM GRANULOCYTES # BLD AUTO: 0 10*3/MM3 (ref 0–0.03)
IMM GRANULOCYTES NFR BLD AUTO: 0 % (ref 0–0.6)
LYMPHOCYTES # BLD AUTO: 1.41 10*3/MM3 (ref 0.6–4.8)
LYMPHOCYTES NFR BLD AUTO: 21.6 % (ref 24–44)
MCH RBC QN AUTO: 32 PG (ref 27–31)
MCHC RBC AUTO-ENTMCNC: 34 G/DL (ref 32–36)
MCV RBC AUTO: 94.1 FL (ref 80–99)
MONOCYTES # BLD AUTO: 0.65 10*3/MM3 (ref 0–1)
MONOCYTES NFR BLD AUTO: 10 % (ref 0–12)
NEUTROPHILS # BLD AUTO: 4.39 10*3/MM3 (ref 1.5–8.3)
NEUTROPHILS NFR BLD AUTO: 67.1 % (ref 41–71)
PLATELET # BLD AUTO: 265 10*3/MM3 (ref 150–450)
PMV BLD AUTO: 10.8 FL (ref 6–12)
POTASSIUM BLD-SCNC: 4.3 MMOL/L (ref 3.5–5.5)
PROT SERPL-MCNC: 7.4 G/DL (ref 5.7–8.2)
RBC # BLD AUTO: 4.72 10*6/MM3 (ref 4.2–5.76)
SODIUM BLD-SCNC: 138 MMOL/L (ref 132–146)
WBC NRBC COR # BLD: 6.53 10*3/MM3 (ref 3.5–10.8)

## 2019-01-29 PROCEDURE — 85652 RBC SED RATE AUTOMATED: CPT

## 2019-01-29 PROCEDURE — 36415 COLL VENOUS BLD VENIPUNCTURE: CPT

## 2019-01-29 PROCEDURE — 85025 COMPLETE CBC W/AUTO DIFF WBC: CPT

## 2019-01-29 PROCEDURE — 86140 C-REACTIVE PROTEIN: CPT

## 2019-01-29 PROCEDURE — 80053 COMPREHEN METABOLIC PANEL: CPT

## 2019-01-30 LAB — BACTERIA SPEC ANAEROBE CULT: ABNORMAL

## 2019-05-02 ENCOUNTER — HOSPITAL ENCOUNTER (OUTPATIENT)
Dept: GENERAL RADIOLOGY | Facility: HOSPITAL | Age: 39
Discharge: HOME | End: 2019-05-02
Admitting: COLON & RECTAL SURGERY

## 2019-05-02 ENCOUNTER — APPOINTMENT (OUTPATIENT)
Dept: PREADMISSION TESTING | Facility: HOSPITAL | Age: 39
End: 2019-05-02

## 2019-05-02 VITALS — BODY MASS INDEX: 29.05 KG/M2 | HEIGHT: 72 IN | WEIGHT: 214.51 LBS

## 2019-05-02 LAB
ANION GAP SERPL CALCULATED.3IONS-SCNC: 14 MMOL/L
BUN BLD-MCNC: 12 MG/DL (ref 6–20)
BUN/CREAT SERPL: 16.4 (ref 7–25)
CALCIUM SPEC-SCNC: 9 MG/DL (ref 8.6–10.5)
CHLORIDE SERPL-SCNC: 102 MMOL/L (ref 98–107)
CO2 SERPL-SCNC: 24 MMOL/L (ref 22–29)
CREAT BLD-MCNC: 0.73 MG/DL (ref 0.76–1.27)
DEPRECATED RDW RBC AUTO: 50.3 FL (ref 37–54)
ERYTHROCYTE [DISTWIDTH] IN BLOOD BY AUTOMATED COUNT: 14.8 % (ref 12.3–15.4)
GFR SERPL CREATININE-BSD FRML MDRD: 119 ML/MIN/1.73
GLUCOSE BLD-MCNC: 97 MG/DL (ref 65–99)
HBA1C MFR BLD: 4.9 % (ref 4.8–5.6)
HCT VFR BLD AUTO: 45.4 % (ref 37.5–51)
HGB BLD-MCNC: 15.2 G/DL (ref 13–17.7)
MCH RBC QN AUTO: 31.5 PG (ref 26.6–33)
MCHC RBC AUTO-ENTMCNC: 33.5 G/DL (ref 31.5–35.7)
MCV RBC AUTO: 94 FL (ref 79–97)
PLATELET # BLD AUTO: 248 10*3/MM3 (ref 140–450)
PMV BLD AUTO: 10 FL (ref 6–12)
POTASSIUM BLD-SCNC: 3.9 MMOL/L (ref 3.5–5.2)
RBC # BLD AUTO: 4.83 10*6/MM3 (ref 4.14–5.8)
SODIUM BLD-SCNC: 140 MMOL/L (ref 136–145)
WBC NRBC COR # BLD: 6.44 10*3/MM3 (ref 3.4–10.8)

## 2019-05-02 PROCEDURE — 93005 ELECTROCARDIOGRAM TRACING: CPT

## 2019-05-02 PROCEDURE — 71046 X-RAY EXAM CHEST 2 VIEWS: CPT

## 2019-05-02 PROCEDURE — 85027 COMPLETE CBC AUTOMATED: CPT | Performed by: COLON & RECTAL SURGERY

## 2019-05-02 PROCEDURE — 93010 ELECTROCARDIOGRAM REPORT: CPT | Performed by: INTERNAL MEDICINE

## 2019-05-02 PROCEDURE — 36415 COLL VENOUS BLD VENIPUNCTURE: CPT

## 2019-05-02 PROCEDURE — 80048 BASIC METABOLIC PNL TOTAL CA: CPT | Performed by: COLON & RECTAL SURGERY

## 2019-05-02 PROCEDURE — 83036 HEMOGLOBIN GLYCOSYLATED A1C: CPT | Performed by: COLON & RECTAL SURGERY

## 2019-05-02 NOTE — PAT
Patient to apply Chlorhexadine wipes  to surgical area (as instructed) the night before procedure and the AM of procedure. Wipes provided.    Patient instructed to drink 20 ounces (or until full) of Gatorade or 20 ounces of G2 (if diabetic) and complete 1 hour before arrival time for procedure (NO RED Gatorade or G2)    Patient verbalized understanding.

## 2019-05-05 ENCOUNTER — ANESTHESIA EVENT (OUTPATIENT)
Dept: PERIOP | Facility: HOSPITAL | Age: 39
End: 2019-05-05

## 2019-05-06 ENCOUNTER — ANESTHESIA (OUTPATIENT)
Dept: PERIOP | Facility: HOSPITAL | Age: 39
End: 2019-05-06

## 2019-05-06 ENCOUNTER — HOSPITAL ENCOUNTER (INPATIENT)
Facility: HOSPITAL | Age: 39
LOS: 2 days | Discharge: HOME | End: 2019-05-08
Attending: COLON & RECTAL SURGERY | Admitting: COLON & RECTAL SURGERY

## 2019-05-06 DIAGNOSIS — K57.20 ABSCESS OF SIGMOID COLON DUE TO DIVERTICULITIS: Primary | ICD-10-CM

## 2019-05-06 DIAGNOSIS — K57.92 DIVERTICULITIS: ICD-10-CM

## 2019-05-06 PROBLEM — F10.90 ALCOHOL USE: Chronic | Status: ACTIVE | Noted: 2019-05-06

## 2019-05-06 PROBLEM — L71.9 ROSACEA: Chronic | Status: ACTIVE | Noted: 2019-05-06

## 2019-05-06 PROBLEM — Z78.9 ALCOHOL USE: Chronic | Status: ACTIVE | Noted: 2019-05-06

## 2019-05-06 PROBLEM — G47.30 SLEEP APNEA: Chronic | Status: ACTIVE | Noted: 2019-05-06

## 2019-05-06 PROCEDURE — 88302 TISSUE EXAM BY PATHOLOGIST: CPT | Performed by: COLON & RECTAL SURGERY

## 2019-05-06 PROCEDURE — 0DTN0ZZ RESECTION OF SIGMOID COLON, OPEN APPROACH: ICD-10-PCS | Performed by: COLON & RECTAL SURGERY

## 2019-05-06 PROCEDURE — 25010000002 DEXAMETHASONE PER 1 MG: Performed by: NURSE ANESTHETIST, CERTIFIED REGISTERED

## 2019-05-06 PROCEDURE — 5A09357 ASSISTANCE WITH RESPIRATORY VENTILATION, LESS THAN 24 CONSECUTIVE HOURS, CONTINUOUS POSITIVE AIRWAY PRESSURE: ICD-10-PCS | Performed by: COLON & RECTAL SURGERY

## 2019-05-06 PROCEDURE — 25010000002 ONDANSETRON PER 1 MG: Performed by: NURSE ANESTHETIST, CERTIFIED REGISTERED

## 2019-05-06 PROCEDURE — 0DBP0ZZ EXCISION OF RECTUM, OPEN APPROACH: ICD-10-PCS | Performed by: COLON & RECTAL SURGERY

## 2019-05-06 PROCEDURE — 25010000002 PROPOFOL 10 MG/ML EMULSION: Performed by: NURSE ANESTHETIST, CERTIFIED REGISTERED

## 2019-05-06 PROCEDURE — 25010000002 HEPARIN (PORCINE) PER 1000 UNITS: Performed by: COLON & RECTAL SURGERY

## 2019-05-06 PROCEDURE — 25010000002 PROPOFOL 1000 MG/ML EMULSION: Performed by: NURSE ANESTHETIST, CERTIFIED REGISTERED

## 2019-05-06 PROCEDURE — 25010000002 MIDAZOLAM PER 1 MG: Performed by: ANESTHESIOLOGY

## 2019-05-06 PROCEDURE — 25010000002 PROMETHAZINE PER 50 MG: Performed by: NURSE PRACTITIONER

## 2019-05-06 PROCEDURE — 25010000002 DEXAMETHASONE SODIUM PHOSPHATE 10 MG/ML SOLUTION: Performed by: ANESTHESIOLOGY

## 2019-05-06 PROCEDURE — 3E0H3GC INTRODUCTION OF OTHER THERAPEUTIC SUBSTANCE INTO LOWER GI, PERCUTANEOUS APPROACH: ICD-10-PCS | Performed by: COLON & RECTAL SURGERY

## 2019-05-06 PROCEDURE — 25010000002 ERTAPENEM 1 GM/100ML SOLUTION: Performed by: COLON & RECTAL SURGERY

## 2019-05-06 PROCEDURE — 88307 TISSUE EXAM BY PATHOLOGIST: CPT | Performed by: COLON & RECTAL SURGERY

## 2019-05-06 PROCEDURE — 25010000002 NEOSTIGMINE 10 MG/10ML SOLUTION: Performed by: NURSE ANESTHETIST, CERTIFIED REGISTERED

## 2019-05-06 PROCEDURE — 0DTJ0ZZ RESECTION OF APPENDIX, OPEN APPROACH: ICD-10-PCS | Performed by: COLON & RECTAL SURGERY

## 2019-05-06 PROCEDURE — 25010000002 BUPRENORPHINE PER 0.1 MG: Performed by: ANESTHESIOLOGY

## 2019-05-06 PROCEDURE — 99252 IP/OBS CONSLTJ NEW/EST SF 35: CPT | Performed by: NURSE PRACTITIONER

## 2019-05-06 PROCEDURE — 0WQF0ZZ REPAIR ABDOMINAL WALL, OPEN APPROACH: ICD-10-PCS | Performed by: COLON & RECTAL SURGERY

## 2019-05-06 PROCEDURE — 25010000002 FENTANYL CITRATE (PF) 100 MCG/2ML SOLUTION: Performed by: NURSE ANESTHETIST, CERTIFIED REGISTERED

## 2019-05-06 PROCEDURE — 94660 CPAP INITIATION&MGMT: CPT

## 2019-05-06 PROCEDURE — 25010000002 ONDANSETRON PER 1 MG: Performed by: COLON & RECTAL SURGERY

## 2019-05-06 RX ORDER — HYDROCODONE BITARTRATE AND ACETAMINOPHEN 5; 325 MG/1; MG/1
1 TABLET ORAL ONCE AS NEEDED
Status: DISCONTINUED | OUTPATIENT
Start: 2019-05-06 | End: 2019-05-06 | Stop reason: HOSPADM

## 2019-05-06 RX ORDER — HYDROMORPHONE HYDROCHLORIDE 1 MG/ML
0.5 INJECTION, SOLUTION INTRAMUSCULAR; INTRAVENOUS; SUBCUTANEOUS
Status: DISCONTINUED | OUTPATIENT
Start: 2019-05-06 | End: 2019-05-06 | Stop reason: HOSPADM

## 2019-05-06 RX ORDER — SODIUM CHLORIDE 0.9 % (FLUSH) 0.9 %
3 SYRINGE (ML) INJECTION EVERY 12 HOURS SCHEDULED
Status: DISCONTINUED | OUTPATIENT
Start: 2019-05-06 | End: 2019-05-06 | Stop reason: HOSPADM

## 2019-05-06 RX ORDER — SODIUM CHLORIDE, SODIUM LACTATE, POTASSIUM CHLORIDE, CALCIUM CHLORIDE 600; 310; 30; 20 MG/100ML; MG/100ML; MG/100ML; MG/100ML
INJECTION, SOLUTION INTRAVENOUS CONTINUOUS PRN
Status: DISCONTINUED | OUTPATIENT
Start: 2019-05-06 | End: 2019-05-06 | Stop reason: SURG

## 2019-05-06 RX ORDER — NALOXONE HCL 0.4 MG/ML
0.4 VIAL (ML) INJECTION
Status: DISCONTINUED | OUTPATIENT
Start: 2019-05-06 | End: 2019-05-08 | Stop reason: HOSPADM

## 2019-05-06 RX ORDER — PROMETHAZINE HYDROCHLORIDE 25 MG/1
25 TABLET ORAL ONCE AS NEEDED
Status: DISCONTINUED | OUTPATIENT
Start: 2019-05-06 | End: 2019-05-06 | Stop reason: HOSPADM

## 2019-05-06 RX ORDER — ACETAMINOPHEN 500 MG
1000 TABLET ORAL ONCE
Status: COMPLETED | OUTPATIENT
Start: 2019-05-06 | End: 2019-05-06

## 2019-05-06 RX ORDER — ONDANSETRON 2 MG/ML
4 INJECTION INTRAMUSCULAR; INTRAVENOUS ONCE AS NEEDED
Status: DISCONTINUED | OUTPATIENT
Start: 2019-05-06 | End: 2019-05-06 | Stop reason: HOSPADM

## 2019-05-06 RX ORDER — MORPHINE SULFATE 2 MG/ML
2 INJECTION, SOLUTION INTRAMUSCULAR; INTRAVENOUS
Status: DISCONTINUED | OUTPATIENT
Start: 2019-05-06 | End: 2019-05-08 | Stop reason: HOSPADM

## 2019-05-06 RX ORDER — ALVIMOPAN 12 MG/1
12 CAPSULE ORAL 2 TIMES DAILY
Status: DISCONTINUED | OUTPATIENT
Start: 2019-05-07 | End: 2019-05-08 | Stop reason: HOSPADM

## 2019-05-06 RX ORDER — SODIUM CHLORIDE, SODIUM LACTATE, POTASSIUM CHLORIDE, CALCIUM CHLORIDE 600; 310; 30; 20 MG/100ML; MG/100ML; MG/100ML; MG/100ML
9 INJECTION, SOLUTION INTRAVENOUS CONTINUOUS PRN
Status: DISCONTINUED | OUTPATIENT
Start: 2019-05-06 | End: 2019-05-08 | Stop reason: HOSPADM

## 2019-05-06 RX ORDER — LABETALOL HYDROCHLORIDE 5 MG/ML
5 INJECTION, SOLUTION INTRAVENOUS
Status: DISCONTINUED | OUTPATIENT
Start: 2019-05-06 | End: 2019-05-06 | Stop reason: HOSPADM

## 2019-05-06 RX ORDER — OXYCODONE HYDROCHLORIDE 5 MG/1
5 TABLET ORAL EVERY 4 HOURS PRN
Status: DISCONTINUED | OUTPATIENT
Start: 2019-05-06 | End: 2019-05-08 | Stop reason: HOSPADM

## 2019-05-06 RX ORDER — PROMETHAZINE HYDROCHLORIDE 25 MG/1
25 SUPPOSITORY RECTAL ONCE AS NEEDED
Status: DISCONTINUED | OUTPATIENT
Start: 2019-05-06 | End: 2019-05-06 | Stop reason: HOSPADM

## 2019-05-06 RX ORDER — HEPARIN SODIUM 5000 [USP'U]/ML
5000 INJECTION, SOLUTION INTRAVENOUS; SUBCUTANEOUS ONCE
Status: COMPLETED | OUTPATIENT
Start: 2019-05-06 | End: 2019-05-06

## 2019-05-06 RX ORDER — ONDANSETRON 2 MG/ML
INJECTION INTRAMUSCULAR; INTRAVENOUS AS NEEDED
Status: DISCONTINUED | OUTPATIENT
Start: 2019-05-06 | End: 2019-05-06 | Stop reason: SURG

## 2019-05-06 RX ORDER — SCOLOPAMINE TRANSDERMAL SYSTEM 1 MG/1
1 PATCH, EXTENDED RELEASE TRANSDERMAL CONTINUOUS
Status: DISCONTINUED | OUTPATIENT
Start: 2019-05-06 | End: 2019-05-08 | Stop reason: HOSPADM

## 2019-05-06 RX ORDER — ALVIMOPAN 12 MG/1
12 CAPSULE ORAL ONCE
Status: COMPLETED | OUTPATIENT
Start: 2019-05-06 | End: 2019-05-06

## 2019-05-06 RX ORDER — FAMOTIDINE 20 MG/1
20 TABLET, FILM COATED ORAL
Status: DISCONTINUED | OUTPATIENT
Start: 2019-05-06 | End: 2019-05-06 | Stop reason: HOSPADM

## 2019-05-06 RX ORDER — FENTANYL CITRATE 50 UG/ML
50 INJECTION, SOLUTION INTRAMUSCULAR; INTRAVENOUS
Status: DISCONTINUED | OUTPATIENT
Start: 2019-05-06 | End: 2019-05-06 | Stop reason: HOSPADM

## 2019-05-06 RX ORDER — PROMETHAZINE HYDROCHLORIDE 25 MG/ML
12.5 INJECTION, SOLUTION INTRAMUSCULAR; INTRAVENOUS ONCE
Status: COMPLETED | OUTPATIENT
Start: 2019-05-06 | End: 2019-05-06

## 2019-05-06 RX ORDER — ONDANSETRON 2 MG/ML
4 INJECTION INTRAMUSCULAR; INTRAVENOUS EVERY 6 HOURS PRN
Status: DISCONTINUED | OUTPATIENT
Start: 2019-05-06 | End: 2019-05-08 | Stop reason: HOSPADM

## 2019-05-06 RX ORDER — LIDOCAINE HYDROCHLORIDE 20 MG/ML
INJECTION, SOLUTION INFILTRATION; PERINEURAL AS NEEDED
Status: DISCONTINUED | OUTPATIENT
Start: 2019-05-06 | End: 2019-05-06 | Stop reason: SURG

## 2019-05-06 RX ORDER — MEPERIDINE HYDROCHLORIDE 25 MG/ML
12.5 INJECTION INTRAMUSCULAR; INTRAVENOUS; SUBCUTANEOUS
Status: DISCONTINUED | OUTPATIENT
Start: 2019-05-06 | End: 2019-05-06 | Stop reason: HOSPADM

## 2019-05-06 RX ORDER — SODIUM CHLORIDE 0.9 % (FLUSH) 0.9 %
1-10 SYRINGE (ML) INJECTION AS NEEDED
Status: DISCONTINUED | OUTPATIENT
Start: 2019-05-06 | End: 2019-05-06 | Stop reason: HOSPADM

## 2019-05-06 RX ORDER — BUPIVACAINE HYDROCHLORIDE 2.5 MG/ML
INJECTION, SOLUTION EPIDURAL; INFILTRATION; INTRACAUDAL
Status: COMPLETED | OUTPATIENT
Start: 2019-05-06 | End: 2019-05-06

## 2019-05-06 RX ORDER — MAGNESIUM HYDROXIDE 1200 MG/15ML
LIQUID ORAL AS NEEDED
Status: DISCONTINUED | OUTPATIENT
Start: 2019-05-06 | End: 2019-05-06 | Stop reason: HOSPADM

## 2019-05-06 RX ORDER — ESMOLOL HYDROCHLORIDE 10 MG/ML
INJECTION INTRAVENOUS AS NEEDED
Status: DISCONTINUED | OUTPATIENT
Start: 2019-05-06 | End: 2019-05-06 | Stop reason: SURG

## 2019-05-06 RX ORDER — HYDRALAZINE HYDROCHLORIDE 20 MG/ML
5 INJECTION INTRAMUSCULAR; INTRAVENOUS
Status: DISCONTINUED | OUTPATIENT
Start: 2019-05-06 | End: 2019-05-06 | Stop reason: HOSPADM

## 2019-05-06 RX ORDER — NEOSTIGMINE METHYLSULFATE 1 MG/ML
INJECTION, SOLUTION INTRAVENOUS AS NEEDED
Status: DISCONTINUED | OUTPATIENT
Start: 2019-05-06 | End: 2019-05-06 | Stop reason: SURG

## 2019-05-06 RX ORDER — SODIUM CHLORIDE 9 MG/ML
100 INJECTION, SOLUTION INTRAVENOUS ONCE
Status: COMPLETED | OUTPATIENT
Start: 2019-05-06 | End: 2019-05-07

## 2019-05-06 RX ORDER — NALOXONE HCL 0.4 MG/ML
0.4 VIAL (ML) INJECTION AS NEEDED
Status: DISCONTINUED | OUTPATIENT
Start: 2019-05-06 | End: 2019-05-06 | Stop reason: HOSPADM

## 2019-05-06 RX ORDER — MIDAZOLAM HYDROCHLORIDE 1 MG/ML
2 INJECTION INTRAMUSCULAR; INTRAVENOUS ONCE
Status: COMPLETED | OUTPATIENT
Start: 2019-05-06 | End: 2019-05-06

## 2019-05-06 RX ORDER — MELOXICAM 7.5 MG/1
15 TABLET ORAL ONCE
Status: COMPLETED | OUTPATIENT
Start: 2019-05-06 | End: 2019-05-06

## 2019-05-06 RX ORDER — FENTANYL CITRATE 50 UG/ML
INJECTION, SOLUTION INTRAMUSCULAR; INTRAVENOUS AS NEEDED
Status: DISCONTINUED | OUTPATIENT
Start: 2019-05-06 | End: 2019-05-06 | Stop reason: SURG

## 2019-05-06 RX ORDER — LIDOCAINE HYDROCHLORIDE 10 MG/ML
0.5 INJECTION, SOLUTION EPIDURAL; INFILTRATION; INTRACAUDAL; PERINEURAL ONCE AS NEEDED
Status: COMPLETED | OUTPATIENT
Start: 2019-05-06 | End: 2019-05-06

## 2019-05-06 RX ORDER — GLYCOPYRROLATE 0.2 MG/ML
INJECTION INTRAMUSCULAR; INTRAVENOUS AS NEEDED
Status: DISCONTINUED | OUTPATIENT
Start: 2019-05-06 | End: 2019-05-06 | Stop reason: SURG

## 2019-05-06 RX ORDER — DIAZEPAM 5 MG/1
5 TABLET ORAL EVERY 6 HOURS PRN
Status: DISCONTINUED | OUTPATIENT
Start: 2019-05-06 | End: 2019-05-08 | Stop reason: HOSPADM

## 2019-05-06 RX ORDER — SODIUM CHLORIDE 9 MG/ML
1000 INJECTION, SOLUTION INTRAVENOUS ONCE
Status: COMPLETED | OUTPATIENT
Start: 2019-05-06 | End: 2019-05-06

## 2019-05-06 RX ORDER — LORATADINE 10 MG/1
10 TABLET ORAL DAILY PRN
COMMUNITY

## 2019-05-06 RX ORDER — DEXAMETHASONE SODIUM PHOSPHATE 10 MG/ML
INJECTION, SOLUTION INTRAMUSCULAR; INTRAVENOUS
Status: COMPLETED | OUTPATIENT
Start: 2019-05-06 | End: 2019-05-06

## 2019-05-06 RX ORDER — DEXAMETHASONE SODIUM PHOSPHATE 10 MG/ML
INJECTION INTRAMUSCULAR; INTRAVENOUS AS NEEDED
Status: DISCONTINUED | OUTPATIENT
Start: 2019-05-06 | End: 2019-05-06 | Stop reason: SURG

## 2019-05-06 RX ORDER — CETIRIZINE HYDROCHLORIDE 10 MG/1
10 TABLET ORAL DAILY
Status: DISCONTINUED | OUTPATIENT
Start: 2019-05-06 | End: 2019-05-08 | Stop reason: HOSPADM

## 2019-05-06 RX ORDER — PREGABALIN 75 MG/1
75 CAPSULE ORAL ONCE
Status: COMPLETED | OUTPATIENT
Start: 2019-05-06 | End: 2019-05-06

## 2019-05-06 RX ORDER — ROCURONIUM BROMIDE 10 MG/ML
INJECTION, SOLUTION INTRAVENOUS AS NEEDED
Status: DISCONTINUED | OUTPATIENT
Start: 2019-05-06 | End: 2019-05-06 | Stop reason: SURG

## 2019-05-06 RX ORDER — BUPRENORPHINE HYDROCHLORIDE 0.32 MG/ML
INJECTION INTRAMUSCULAR; INTRAVENOUS
Status: COMPLETED | OUTPATIENT
Start: 2019-05-06 | End: 2019-05-06

## 2019-05-06 RX ORDER — PROPOFOL 10 MG/ML
VIAL (ML) INTRAVENOUS AS NEEDED
Status: DISCONTINUED | OUTPATIENT
Start: 2019-05-06 | End: 2019-05-06 | Stop reason: SURG

## 2019-05-06 RX ORDER — PROMETHAZINE HYDROCHLORIDE 25 MG/ML
6.25 INJECTION, SOLUTION INTRAMUSCULAR; INTRAVENOUS ONCE AS NEEDED
Status: DISCONTINUED | OUTPATIENT
Start: 2019-05-06 | End: 2019-05-06 | Stop reason: HOSPADM

## 2019-05-06 RX ORDER — IPRATROPIUM BROMIDE AND ALBUTEROL SULFATE 2.5; .5 MG/3ML; MG/3ML
3 SOLUTION RESPIRATORY (INHALATION) ONCE AS NEEDED
Status: DISCONTINUED | OUTPATIENT
Start: 2019-05-06 | End: 2019-05-06 | Stop reason: HOSPADM

## 2019-05-06 RX ORDER — ACETAMINOPHEN 500 MG
1000 TABLET ORAL EVERY 8 HOURS
Status: DISCONTINUED | OUTPATIENT
Start: 2019-05-06 | End: 2019-05-08 | Stop reason: HOSPADM

## 2019-05-06 RX ADMIN — PREGABALIN 75 MG: 75 CAPSULE ORAL at 07:42

## 2019-05-06 RX ADMIN — SODIUM CHLORIDE, POTASSIUM CHLORIDE, SODIUM LACTATE AND CALCIUM CHLORIDE: 600; 310; 30; 20 INJECTION, SOLUTION INTRAVENOUS at 09:19

## 2019-05-06 RX ADMIN — ONDANSETRON 4 MG: 2 INJECTION INTRAMUSCULAR; INTRAVENOUS at 18:04

## 2019-05-06 RX ADMIN — SODIUM CHLORIDE, POTASSIUM CHLORIDE, SODIUM LACTATE AND CALCIUM CHLORIDE: 600; 310; 30; 20 INJECTION, SOLUTION INTRAVENOUS at 11:14

## 2019-05-06 RX ADMIN — SODIUM CHLORIDE 100 ML/HR: 9 INJECTION, SOLUTION INTRAVENOUS at 20:18

## 2019-05-06 RX ADMIN — HEPARIN SODIUM 5000 UNITS: 5000 INJECTION INTRAVENOUS; SUBCUTANEOUS at 07:42

## 2019-05-06 RX ADMIN — SCOPALAMINE 1 PATCH: 1 PATCH, EXTENDED RELEASE TRANSDERMAL at 07:42

## 2019-05-06 RX ADMIN — GLYCOPYRROLATE 0.4 MG: 0.2 INJECTION, SOLUTION INTRAMUSCULAR; INTRAVENOUS at 11:32

## 2019-05-06 RX ADMIN — BUPIVACAINE HYDROCHLORIDE 60 ML: 2.5 INJECTION, SOLUTION EPIDURAL; INFILTRATION; INTRACAUDAL; PERINEURAL at 09:28

## 2019-05-06 RX ADMIN — PROMETHAZINE HYDROCHLORIDE 12.5 MG: 25 INJECTION INTRAMUSCULAR; INTRAVENOUS at 23:10

## 2019-05-06 RX ADMIN — GLYCOPYRROLATE 0.2 MG: 0.2 INJECTION, SOLUTION INTRAMUSCULAR; INTRAVENOUS at 10:03

## 2019-05-06 RX ADMIN — PROPOFOL 25 MCG/KG/MIN: 10 INJECTION, EMULSION INTRAVENOUS at 09:36

## 2019-05-06 RX ADMIN — FENTANYL CITRATE 25 MCG: 50 INJECTION, SOLUTION INTRAMUSCULAR; INTRAVENOUS at 10:20

## 2019-05-06 RX ADMIN — SODIUM CHLORIDE, POTASSIUM CHLORIDE, SODIUM LACTATE AND CALCIUM CHLORIDE 9 ML/HR: 600; 310; 30; 20 INJECTION, SOLUTION INTRAVENOUS at 07:58

## 2019-05-06 RX ADMIN — DEXAMETHASONE SODIUM PHOSPHATE 6 MG: 10 INJECTION INTRAMUSCULAR; INTRAVENOUS at 09:43

## 2019-05-06 RX ADMIN — DEXAMETHASONE SODIUM PHOSPHATE 4 MG: 10 INJECTION INTRAMUSCULAR; INTRAVENOUS at 09:28

## 2019-05-06 RX ADMIN — NEOSTIGMINE METHYLSULFATE 4 MG: 1 INJECTION, SOLUTION INTRAVENOUS at 11:32

## 2019-05-06 RX ADMIN — ACETAMINOPHEN 1000 MG: 500 TABLET, FILM COATED ORAL at 23:10

## 2019-05-06 RX ADMIN — ACETAMINOPHEN 1000 MG: 500 TABLET ORAL at 07:42

## 2019-05-06 RX ADMIN — ROCURONIUM BROMIDE 50 MG: 10 INJECTION INTRAVENOUS at 09:25

## 2019-05-06 RX ADMIN — LIDOCAINE HYDROCHLORIDE 0.2 ML: 10 INJECTION, SOLUTION EPIDURAL; INFILTRATION; INTRACAUDAL; PERINEURAL at 07:28

## 2019-05-06 RX ADMIN — ACETAMINOPHEN 1000 MG: 500 TABLET, FILM COATED ORAL at 18:04

## 2019-05-06 RX ADMIN — ERTAPENEM SODIUM 1 G: 1 INJECTION, POWDER, LYOPHILIZED, FOR SOLUTION INTRAMUSCULAR; INTRAVENOUS at 09:29

## 2019-05-06 RX ADMIN — SODIUM CHLORIDE 1000 ML: 9 INJECTION, SOLUTION INTRAVENOUS at 07:28

## 2019-05-06 RX ADMIN — ESMOLOL HYDROCHLORIDE 20 MG: 10 INJECTION INTRAVENOUS at 09:25

## 2019-05-06 RX ADMIN — ONDANSETRON 4 MG: 2 INJECTION INTRAMUSCULAR; INTRAVENOUS at 11:23

## 2019-05-06 RX ADMIN — FENTANYL CITRATE 25 MCG: 50 INJECTION, SOLUTION INTRAMUSCULAR; INTRAVENOUS at 09:25

## 2019-05-06 RX ADMIN — MIDAZOLAM HYDROCHLORIDE 2 MG: 1 INJECTION, SOLUTION INTRAMUSCULAR; INTRAVENOUS at 07:55

## 2019-05-06 RX ADMIN — FAMOTIDINE 20 MG: 20 TABLET ORAL at 07:42

## 2019-05-06 RX ADMIN — BUPRENORPHINE HYDROCHLORIDE 0.3 MG: 0.32 INJECTION INTRAMUSCULAR; INTRAVENOUS at 09:28

## 2019-05-06 RX ADMIN — MELOXICAM 15 MG: 7.5 TABLET ORAL at 07:42

## 2019-05-06 RX ADMIN — PROPOFOL 200 MG: 10 INJECTION, EMULSION INTRAVENOUS at 09:25

## 2019-05-06 RX ADMIN — CETIRIZINE HYDROCHLORIDE 10 MG: 10 TABLET, FILM COATED ORAL at 18:04

## 2019-05-06 RX ADMIN — LIDOCAINE HYDROCHLORIDE 50 MG: 20 INJECTION, SOLUTION INFILTRATION; PERINEURAL at 09:25

## 2019-05-06 RX ADMIN — ALVIMOPAN 12 MG: 12 CAPSULE ORAL at 07:42

## 2019-05-06 NOTE — H&P
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  Diagnostics revealed diverticulitis with diverticular abscess s/p CT guided drainage of abscess with outpatient IV antibiotics with ID (Dr. Ventura) which are now completed.  Also, noted appearance of fistula to cecum.  Here today for Open low anterior resection, possible cecectomy, and appendectomy.     Review of Systems:  General ROS: negative for chills, fever or skin lesions;  No changes since last office visit  Cardiovascular ROS: no chest pain or dyspnea on exertion  Respiratory ROS: no cough, shortness of breath, or wheezing    Allergies:   Allergies   Allergen Reactions   • Pollen Extract Other (See Comments)     Sinus related       Home Meds:    No current facility-administered medications on file prior to encounter.      Current Outpatient Medications on File Prior to Encounter   Medication Sig Dispense Refill   • loratadine (CLARITIN) 10 MG tablet Take 10 mg by mouth Daily.     • RHOFADE 1 % cream Apply 1 application topically Every Morning.  2       PMH:   Past Medical History:   Diagnosis Date   • Allergic rhinitis    • Diverticulitis 01/15/2019   • Environmental allergies    • Fatigue    • Rosacea    • Sleep apnea     wears CPAP \"sometimes\"   • Snores      PSH:    Past Surgical History:   Procedure Laterality Date   • COLONOSCOPY     • CT ABSCESS DRAIN CYST ASP     • TONSILLECTOMY     • VASECTOMY     • WISDOM TOOTH EXTRACTION       Social History:   Tobacco:   Social History     Tobacco Use   Smoking Status Light Tobacco Smoker   • Types: Cigarettes   • Start date: 1/8/2004   Smokeless Tobacco Former User   Tobacco Comment    occasionally       Alcohol:     Social History     Substance and Sexual Activity   Alcohol Use Yes    Comment: " 8-10/week       Vitals:           BP (!) 142/106 (BP Location: Right arm, Patient Position: Lying)   Pulse 78   Temp 97.3 °F (36.3 °C) (Temporal)   Resp 18   SpO2 97%     Physical Exam:  General Appearance:    Alert, cooperative, no distress, appears stated age   Head:    Normocephalic, without obvious abnormality, atraumatic   Lungs:     Clear to auscultation bilaterally, respirations unlabored    Heart:   Regular rate and rhythm, S1 and S2 normal, no murmur, rub    or gallop    Abdomen:    Soft, non-tender.  +bowel sounds   Breast Exam:    deferred   Genitalia:    deferred   Extremities:   Extremities normal, atraumatic, no cyanosis or edema   Skin:   Skin color, texture, turgor normal, no rashes or lesions   Neurologic:   Grossly intact   Results Review  LABS:  Lab Results   Component Value Date    WBC 6.44 05/02/2019    HGB 15.2 05/02/2019    HCT 45.4 05/02/2019    MCV 94.0 05/02/2019     05/02/2019    NEUTROABS 4.39 01/29/2019    GLUCOSE 97 05/02/2019    BUN 12 05/02/2019    CREATININE 0.73 (L) 05/02/2019    EGFRIFNONA 119 05/02/2019     05/02/2019    K 3.9 05/02/2019     05/02/2019    CO2 24.0 05/02/2019    CALCIUM 9.0 05/02/2019    ALBUMIN 4.23 01/29/2019    AST 33 01/29/2019    ALT 36 01/29/2019    BILITOT 0.7 01/29/2019       RADIOLOGY:  Imaging Results (last 72 hours)     ** No results found for the last 72 hours. **          I reviewed the patient's new clinical results.    Cancer Staging (if applicable)  Cancer Patient: __ yes _x_no __unknown; If yes, clinical stage T:__ N:__M:__, stage group or __N/A    Impression: Diverticulitis and Colonic fistula    Plan: Open lower anterior resection, possible cecectomy and appendectomy    Isatu Benito, APRN   5/6/2019   7:51 AM

## 2019-05-06 NOTE — ANESTHESIA POSTPROCEDURE EVALUATION
Patient: Arie Hayden    Procedure Summary     Date:  05/06/19 Room / [facility]:   [facility]    Anesthesia Start:  0919 Anesthesia Stop:      Procedure:  LOWER ANTERIOR RESECTION, appendectomy, takedown of splenic flexure, excision and closure of colocecal fistula, umbilical hernia repair, & proctoscopy (N/A Abdomen) Diagnosis:      Surgeon:  Mitchel Beck MD Provider:  [place] Patricia MD    Anesthesia Type:  general ASA Status:  2          Anesthesia Type: general  Last vitals  BP   128/81   Temp   97.2   Pulse   83   Resp   16     SpO2   95%     Post Anesthesia Care and Evaluation    Patient location during evaluation: PACU  Patient participation: waiting for patient participation  Level of consciousness: responsive to light touch  Pain score: 0  Pain management: adequate  Airway patency: patent  Anesthetic complications: No anesthetic complications  PONV Status: none  Cardiovascular status: hemodynamically stable and acceptable  Respiratory status: nonlabored ventilation, acceptable, nasal cannula and oral airway  Hydration status: acceptable

## 2019-05-06 NOTE — CONSULTS
[facility] Medicine Services  CONSULT NOTE      Patient Name: Arie Hayden  : 1980  MRN: [id number]    Primary Care Physician: Maira Dawkins PA-C  Provider requesting consultation: Mitchel Beck MD    Subjective   Subjective     Reason for Consultation:  Hx of diverticulitis with recent abscess  For planned LAR, hospitalist consult for medical management postop    HPI:  Arie Hayden is a 39 y.o. male with past medical history significant for ANGELIQUE with cpap (sometimes), tobacco use, EtOH use, rosacea and recent diagnosis of diverticulitis in 2019.  Patient was admitted to [facility] in January of this year secondary to diverticulitis with abscess.  Underwent CT guided drainage and home with PICC line and outpatient antibiotics per [facility].  Completed his antibiotic course sometime in February.  Patient presents today as a planned admission for lower anterior resection by Dr. Beck.  She underwent LAR, appendectomy, excision and closure of colovesical fistula, and umbilical hernia repair today as planned.  Hospital medicine service has been consulted postop for medical management.  On exam patient is awake and alert with his mother at bedside.  He states he feels okay.  Denies nausea or vomiting.  Has a Almonte cath in place with clear yellow urine.  Pain is currently well controlled.  We will follow      Review of Systems   Constitutional: Positive for appetite change and fatigue. Negative for chills and fever.   HENT: Negative.    Eyes: Negative.    Respiratory: Negative.    Cardiovascular: Negative.    Gastrointestinal: Positive for abdominal pain. Negative for anal bleeding and constipation.        Recent diverticulitis with abscess.  Completed antibiotics outpatient in February.   Endocrine: Negative.    Genitourinary: Negative.         No outpatient urinary issues.  Currently with Almonte cath in place postop.   Musculoskeletal: Negative.   "  Skin: Positive for wound.        Postop midline abdominal incision.  No drains.   Allergic/Immunologic: Negative.    Neurological: Negative.    Hematological: Negative.    Psychiatric/Behavioral: Negative.          Otherwise complete ROS reviewed and is negative except as mentioned in the HPI.    Past Medical History:   Diagnosis Date   • Allergic rhinitis    • Diverticulitis 01/15/2019   • Environmental allergies    • Fatigue    • Rosacea    • Sleep apnea     wears CPAP \"sometimes\"   • Snores        Past Surgical History:   Procedure Laterality Date   • COLONOSCOPY     • CT ABSCESS DRAIN CYST ASP     • TONSILLECTOMY     • VASECTOMY     • WISDOM TOOTH EXTRACTION         Family History: family history includes Breast cancer in his maternal grandmother; Factor IX deficiency in his father; Factor V Leiden deficiency in his father and paternal grandfather; Hyperlipidemia in his mother; Hypothyroidism in his mother; Lung cancer in his maternal grandfather; No Known Problems in his brother, daughter, daughter, and sister; Prostate cancer in his paternal grandfather; Suicidality in his father. Otherwise pertinent FHx was reviewed and unremarkable.     Social History:  reports that he has been smoking cigarettes.  He started smoking about 15 years ago. He has quit using smokeless tobacco. He reports that he drinks alcohol. He reports that he does not use drugs.    Medications:  Medications Prior to Admission   Medication Sig Dispense Refill Last Dose   • loratadine (CLARITIN) 10 MG tablet Take 10 mg by mouth Daily.   5/3/2019   • RHOFADE 1 % cream Apply 1 application topically Every Morning.  2 5/4/2019         Current Facility-Administered Medications:   •  acetaminophen (TYLENOL) tablet 1,000 mg, 1,000 mg, Oral, Q8H, Mitchel Beck MD  •  [START ON 5/7/2019] alvimopan (ENTEREG) capsule 12 mg, 12 mg, Oral, BID, Mitchel Beck MD  •  cetirizine (zyrTEC) tablet 10 mg, 10 mg, Oral, Daily, Mitchel Beck MD  •  " diazePAM (VALIUM) tablet 5 mg, 5 mg, Oral, Q6H PRN, Mitchel Beck MD  •  [START ON 5/7/2019] enoxaparin (LOVENOX) syringe 40 mg, 40 mg, Subcutaneous, Daily, Mitchel Beck MD  •  lactated ringers infusion, 9 mL/hr, Intravenous, Continuous PRN, Montana Patricia MD, Last Rate: 9 mL/hr at 05/06/19 0758, 9 mL/hr at 05/06/19 0758  •  morphine injection 2 mg, 2 mg, Intravenous, Q1H PRN **AND** naloxone (NARCAN) injection 0.4 mg, 0.4 mg, Intravenous, Q5 Min PRN, Mitchel Beck MD  •  ondansetron (ZOFRAN) injection 4 mg, 4 mg, Intravenous, Q6H PRN, Mitchel Beck MD  •  oxyCODONE (ROXICODONE) immediate release tablet 5 mg, 5 mg, Oral, Q4H PRN, Mitchel Beck MD  •  Scopolamine (TRANSDERM-SCOP) 1.5 MG/3DAYS patch 1 patch, 1 patch, Transdermal, Continuous, Mitchel Beck MD, 1 patch at 05/06/19 0742  •  sodium chloride 0.9 % infusion, 100 mL/hr, Intravenous, Once, Mitchel Beck MD    Allergies   Allergen Reactions   • Pollen Extract Other (See Comments)     Sinus related       Objective   Objective     Vital Signs:   Temp:  [96.9 °F (36.1 °C)-97.5 °F (36.4 °C)] 96.9 °F (36.1 °C)  Heart Rate:  [59-88] 59  Resp:  [16-18] 16  BP: (121-142)/() 121/76     Physical Exam  Constitutional: No acute distress, awake, alert.  Resting back in bed.  Mother at bedside.  Eyes: PERRLA, sclerae anicteric, no conjunctival injection  HENT: NCAT, mucous membranes moist  Neck: Supple, no thyromegaly, no lymphadenopathy, trachea midline  Respiratory: Clear to auscultation bilaterally A/P, nonlabored respirations on 2 LNC oxygen sats 96%.  Cardiovascular: RRR, no murmurs, rubs, or gallops, palpable pedal pulses bilaterally  Gastrointestinal: Positive bowel sounds, soft, nondistended.  Mild postop appropriate TTP to generalized abdomen and incisional area.  No guarding or rebound.  Musculoskeletal: No bilateral ankle edema, no clubbing or cyanosis to extremities.  DIAZ spontaneously  Psychiatric: Appropriate affect,  "cooperative and calm   Neurologic: Oriented x 3, strength symmetric in all extremities, Cranial Nerves grossly intact to confrontation, speech clear and appropriate.  Follows commands   Skin: No rashes.  Midline vertical abdominal incision with dressing in place and intact.  Dressing with scant bloody drainage.  No drains.      Results Reviewed:  I have personally reviewed current lab, radiology, and data and agree.    Results from last 7 days   Lab Units 05/02/19  1521   WBC 10*3/mm3 6.44   HEMOGLOBIN g/dL 15.2   HEMATOCRIT % 45.4   PLATELETS 10*3/mm3 248     Results from last 7 days   Lab Units 05/02/19  1521   SODIUM mmol/L 140   POTASSIUM mmol/L 3.9   CHLORIDE mmol/L 102   CO2 mmol/L 24.0   BUN mg/dL 12   CREATININE mg/dL 0.73*   GLUCOSE mg/dL 97   CALCIUM mg/dL 9.0     Estimated Creatinine Clearance: 162.7 mL/min (A) (by C-G formula based on SCr of 0.73 mg/dL (L)).  Brief Urine Lab Results  (Last result in the past 365 days)      Color   Clarity   Blood   Leuk Est   Nitrite   Protein   CREAT   Urine HCG        01/08/19 1846 Yellow Clear Negative Negative Negative Negative             No results found for: BNP    Microbiology Results Abnormal     None          Imaging Results (last 24 hours)     ** No results found for the last 24 hours. **             Assessment/Plan   Assessment / Plan     Active Hospital Problems    Diagnosis POA   • **Diverticulitis [K57.92] Yes   • Sleep apnea [G47.30] Unknown     wears CPAP \"sometimes\"     • Rosacea [L71.9] Unknown   • Alcohol use [Z78.9] Unknown     Reports 8-10 drinks a week.  No hx of w/d or seizures.  Last drink was Saturday 5/4/19     • Tobacco abuse [Z72.0] Yes     Mr Hayden is a 39-year-old   male with past medical history of ANGELIQUE, rosacea, tobacco use, EtOH use.  Recent diverticulitis with abscess 1/2019 s/p CT-guided drainage and home on IV outpatient antibiotics per [facility].  Admitted today for planned lower anterior resection CRS.  Hospitalist have " "been consulted postop for medical management.    Assessment/plan:    Thank you for asking us to follow along with this pleasant gentleman.  We recommend the following:    Diverticulitis  Recent diverticulitis with abscess 1/2019  --s/p LAR, appendectomy, excision and closure of colovesical fistula, umbilical hernia repair, proctoscopy today 5/6  --Prior admit in January as above.  Underwent CT-guided abscess drainage and home with PICC line and extended IV antibiotic course.  Managed by Dr. Anthony Thomson with [facility].  Completed antibiotics in February.  --Returns today for planned LAR by Dr. Beck  --Postop care per CRS  --A.m. Labs    Hx ANGELIQUE with CPAP use  --Patient reports he uses his CPAP \"sometimes\".  Currently has his mask at bedside asking for CPAP use.  --We will order CPAP per RT.  --Oxygen as needed    Alcohol use  --Admits to 8-10 drinks a week.  Denies ever having withdrawal issues or seizures.  Last drink Saturday, 5/4/2019.    Tobacco abuse  --Admits to approximately 3-4 cigarettes a day.  --Declines nicotine patch  --Assist/advise      DVT prophylaxis:  Per CRS       Thank you for allowing [facility] Medicine Service to provide consultative care for your patient, we will continue to follow while clinically appropriate.    Electronically signed by ADRIÁN Marquez, 05/06/19, 5:03 PM.          "

## 2019-05-06 NOTE — ANESTHESIA PROCEDURE NOTES
Airway  Urgency: elective    Airway not difficult    General Information and Staff    Patient location during procedure: OR  Anesthesiologist: Montana Patricia MD  CRNA: Mohsen Deluna CRNA    Indications and Patient Condition  Indications for airway management: airway protection    Preoxygenated: yes  MILS not maintained throughout  Mask difficulty assessment: 1 - vent by mask    Final Airway Details  Final airway type: endotracheal airway      Successful airway: ETT  Cuffed: yes   Successful intubation technique: video laryngoscopy  Endotracheal tube insertion site: oral  Blade: Maycol  Blade size: 4  ETT size (mm): 7.5  Cormack-Lehane Classification: grade III - view of epiglottis only  Placement verified by: chest auscultation and capnometry   Measured from: lips  ETT to lips (cm): 23  Number of attempts at approach: 1    Additional Comments  Negative epigastric sounds, Breath sound equal bilaterally with symmetric chest rise and fall.    Grade III view obtained with naidu 2/MAC 4.  Glidescope utilized to obtain a grade I view of cords.  Atraumatic intubation, dentition unchanged                SBIRT screening completed per trauma protocol. Patient denies alcohol and/or drug use. Also denies depressive symptoms. Brief Intervention and Referral to Treatment Summary N/A    Patient was provided PHQ-9, AUDIT and DAST Screening:      PHQ-9 Score:  N/A  AUDIT Score:  N/A  DAST Score:  N/A    Patients substance use is considered N/A        Patients depression is considered: N/A        Brief Education Was Provided N/A        Brief Intervention Is Provided (Only for AUDIT or DAST) N/A          Recommendations/Referrals for Brief and/or Specialized Treatment Provided to Patient  N/A    Patient denies any use of alcohol, drugs or depressed mood.

## 2019-05-06 NOTE — ANESTHESIA PROCEDURE NOTES
Peripheral Block      Patient location during procedure: OR  Reason for block: at surgeon's request and post-op pain management  Performed by  Anesthesiologist: Montana Patricia MD  Preanesthetic Checklist  Completed: patient identified, site marked, surgical consent, pre-op evaluation, timeout performed, IV checked, risks and benefits discussed and monitors and equipment checked  Prep:  Pt Position: supine  Sterile barriers:cap, gloves, sterile barriers and mask  Prep: ChloraPrep  Patient monitoring: blood pressure monitoring, continuous pulse oximetry and EKG  Procedure  Sedation:yes  Performed under: general  Guidance:ultrasound guided  Images:still images obtained    Laterality:Bilateral  Block Type:TAP  Injection Technique:single-shot  Needle Type:short-bevel and echogenic  Needle Gauge:20 G    Medications Used: buprenorphine (BUPRENEX) injection, 0.3 mg  dexamethasone sodium phosphate injection, 4 mg  bupivacaine PF (MARCAINE) 0.25 % injection, 60 mL  Med admintered at 5/6/2019 9:28 AM  Medications  Comment:Block Injection:  LA dose divided between Right and Left block       Adjuncts:  Decadron 4mg PSF, Buprenex 0.3mg (Per total volume of LA)    Post Assessment  Injection Assessment: negative aspiration for heme, incremental injection and no paresthesia on injection  Patient Tolerance:comfortable throughout block  Complications:no  Additional Notes      Under Ultrasound guidance, a BBraun 4inch 360 degree needle was advanced with Normal Saline hydro dissection of tissue.  The Internal Oblique and Transversus Abdominus muscles where visualized.  At or before the aponeurosis of Internal Oblique, local anesthetic spread was visualized in the Transversus Abdominus Plane. Injection was made incrementally with aspiration every 5 mls.  There was no  intravascular injection,  injection pressure was normal, there was no neural injection, and the procedure was completed without difficulty.  Thank You.

## 2019-05-06 NOTE — OP NOTE
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  Umbilical hernia    Procedure(s):  LOWER ANTERIOR RESECTION, appendectomy, takedown of splenic flexure, excision and closure of colocecal fistula, umbilical hernia repair, & proctoscopy    Surgeon(s):  Mitchel Beck MD    Anesthesia: General    Staff:   Circulator: Valentina Durham RN; Awilda Fry RN  Scrub Person: Rob Garrett  Nursing Assistant: Kat Veliz  Assistant: Stephanie Wall PA-C    Estimated Blood Loss: 200 mL    Specimens:                  Order Name Source Comment Collection Info Order Time   TISSUE PATHOLOGY EXAM Large Intestine, Sigmoid Colon  Collected By: Mitchel Beck MD 5/6/2019 10:48 AM         Drains:   Urethral Catheter Silicone 16 Fr. (Active)   Collection Container Standard drainage bag 5/6/2019 12:45 PM       [REMOVED] Closed/Suction Drain Anterior Abdomen Bulb 8.5 Fr. (Removed)       [REMOVED] Closed/Suction Drain (Removed)       Findings: The patient was taken to the operating room and placed under general anesthesia in lithotomy on a beanbag with Ethan stirrups and a Almonte catheter.  A tap block was given.  Abdomen was prepped and draped appropriate.  A phlegmon could be appreciated in the left lower quadrant.  A lower vertical incision was made.  Exploration showed an orange sized abscess of the sigmoid colon going directly into the mesentery and not anywhere else.  There was no pus to culture.  There was a fistula up to the cecum near the base of the appendix.  No obvious Crohn's anywhere.  I took the cautery and cut out a ring of cecum containing the fistula, cleaned up the ends with saline and then did a double layer closure with 3-0 Vicryl.  I then mobilized the mesoappendix divided and  ligated and then put a 0 Vicryl at the base the appendix and then amputated the appendix and cauterized its button.  The small bowel gallbladder liver and retroperitoneum were all normal.  I did find 1 cm umbilical hernia on opening.  The adipose tissue was dissected free from there.    After running the small bowel and dissecting to loops of ileum off the inflammatory process the left colon was mobilized along the line of Toldt and the ureter kept out of harm's way.  Presacral space was entered.  The splenic flexure was taken down.  The cul-de-sac was scored.  An EEA sizer was used to help delineate the decussation which fortunately showed a rather long rectum.  This part of the rectum was divided with a green 55 linear stapler.  Then divided the thickened mesentery and multiple bites.  The left colon still had a lot of diverticular disease so I had take even more the splenic flexure down until is able to find a soft spot above some further small areas of diverticulitis.  The bowel was divided and was quite supple.  2-0 Prolene pursestring was used and then a 28 CEEA anvil was sewn in place.  Considerable time was spent cleaning up the end of the future anastomosis.    A sizer was put in the rectum and I cleaned up the left apex of any adipose tissue.  The 28 stapler was brought out to the left aspect of the staple line and then the left colon lined up anatomically in the anastomosis carried out uneventfully.  The left colon was occluded and saline placed in the pelvis.  Proctoscopy showed an airtight anastomosis with good blood supply to about 14 cm.    The abdomen was irrigated with saline and GI contents placed back anatomically.  The left colon had been lined up normally for the anastomosis.  The fascia was closed in a single layer with #1 PDS closing the umbilical hernia simultaneously.  Subcutaneous tissue was irrigated with saline and skin closed with staples.  A Telfa dressing was applied.  The patient  tolerated the procedure well was taken to the recovery room in satisfactory condition.    Complications: 0    CC Dr. Anthony Ventura and MILI Stack MD     Date: 5/6/2019  Time: 1:55 PM

## 2019-05-06 NOTE — ANESTHESIA PREPROCEDURE EVALUATION
Anesthesia Evaluation     Patient summary reviewed and Nursing notes reviewed   NPO Solid Status: > 8 hours  NPO Liquid Status: > 8 hours           Airway   Mallampati: II  TM distance: >3 FB  Neck ROM: full  No difficulty expected  Dental      Pulmonary    (+) a smoker Former, sleep apnea on CPAP,   (-) COPD, asthma, recent URI  Cardiovascular - negative cardio ROS    ECG reviewed    (-) hypertension, past MI, CAD, dysrhythmias, angina, hyperlipidemia    ROS comment: EKG NSR    Neuro/Psych- negative ROS  (-) seizures, CVA  GI/Hepatic/Renal/Endo - negative ROS   (-) liver disease, no renal disease, diabetes, hypothyroidism    ROS Comment: Diverticulosis    Musculoskeletal (-) negative ROS    Abdominal    Substance History - negative use     OB/GYN negative ob/gyn ROS         Other                        Anesthesia Plan    ASA 2     general   (TAPblocks , Propofol infusion,  Opiate sparing  )  intravenous induction   Anesthetic plan, all risks, benefits, and alternatives have been provided, discussed and informed consent has been obtained with: patient.    Plan discussed with CRNA.

## 2019-05-07 LAB
ANION GAP SERPL CALCULATED.3IONS-SCNC: 15 MMOL/L
BASOPHILS # BLD AUTO: 0 10*3/MM3 (ref 0–0.2)
BASOPHILS NFR BLD AUTO: 0 % (ref 0–1.5)
BUN BLD-MCNC: 7 MG/DL (ref 6–20)
BUN/CREAT SERPL: 9.7 (ref 7–25)
CALCIUM SPEC-SCNC: 8.3 MG/DL (ref 8.6–10.5)
CHLORIDE SERPL-SCNC: 98 MMOL/L (ref 98–107)
CO2 SERPL-SCNC: 22 MMOL/L (ref 22–29)
CREAT BLD-MCNC: 0.72 MG/DL (ref 0.76–1.27)
DEPRECATED RDW RBC AUTO: 49.3 FL (ref 37–54)
EOSINOPHIL # BLD AUTO: 0 10*3/MM3 (ref 0–0.4)
EOSINOPHIL NFR BLD AUTO: 0 % (ref 0.3–6.2)
ERYTHROCYTE [DISTWIDTH] IN BLOOD BY AUTOMATED COUNT: 14.4 % (ref 12.3–15.4)
GFR SERPL CREATININE-BSD FRML MDRD: 121 ML/MIN/1.73
GLUCOSE BLD-MCNC: 114 MG/DL (ref 65–99)
HCT VFR BLD AUTO: 41.2 % (ref 37.5–51)
HGB BLD-MCNC: 13.6 G/DL (ref 13–17.7)
IMM GRANULOCYTES # BLD AUTO: 0.02 10*3/MM3 (ref 0–0.05)
IMM GRANULOCYTES NFR BLD AUTO: 0.2 % (ref 0–0.5)
LYMPHOCYTES # BLD AUTO: 1.09 10*3/MM3 (ref 0.7–3.1)
LYMPHOCYTES NFR BLD AUTO: 9.2 % (ref 19.6–45.3)
MCH RBC QN AUTO: 31.1 PG (ref 26.6–33)
MCHC RBC AUTO-ENTMCNC: 33 G/DL (ref 31.5–35.7)
MCV RBC AUTO: 94.3 FL (ref 79–97)
MONOCYTES # BLD AUTO: 1.54 10*3/MM3 (ref 0.1–0.9)
MONOCYTES NFR BLD AUTO: 13 % (ref 5–12)
NEUTROPHILS # BLD AUTO: 9.2 10*3/MM3 (ref 1.7–7)
NEUTROPHILS NFR BLD AUTO: 77.8 % (ref 42.7–76)
PLATELET # BLD AUTO: 241 10*3/MM3 (ref 140–450)
PMV BLD AUTO: 10.8 FL (ref 6–12)
POTASSIUM BLD-SCNC: 4.2 MMOL/L (ref 3.5–5.2)
RBC # BLD AUTO: 4.37 10*6/MM3 (ref 4.14–5.8)
SODIUM BLD-SCNC: 135 MMOL/L (ref 136–145)
WBC NRBC COR # BLD: 11.83 10*3/MM3 (ref 3.4–10.8)

## 2019-05-07 PROCEDURE — 25010000002 ONDANSETRON PER 1 MG: Performed by: COLON & RECTAL SURGERY

## 2019-05-07 PROCEDURE — 94660 CPAP INITIATION&MGMT: CPT

## 2019-05-07 PROCEDURE — 99232 SBSQ HOSP IP/OBS MODERATE 35: CPT | Performed by: INTERNAL MEDICINE

## 2019-05-07 PROCEDURE — 85025 COMPLETE CBC W/AUTO DIFF WBC: CPT | Performed by: NURSE PRACTITIONER

## 2019-05-07 PROCEDURE — 80048 BASIC METABOLIC PNL TOTAL CA: CPT | Performed by: NURSE PRACTITIONER

## 2019-05-07 PROCEDURE — 94799 UNLISTED PULMONARY SVC/PX: CPT

## 2019-05-07 PROCEDURE — 25010000002 MORPHINE PER 10 MG: Performed by: COLON & RECTAL SURGERY

## 2019-05-07 PROCEDURE — 25010000002 ENOXAPARIN PER 10 MG: Performed by: COLON & RECTAL SURGERY

## 2019-05-07 RX ADMIN — ONDANSETRON 4 MG: 2 INJECTION INTRAMUSCULAR; INTRAVENOUS at 04:47

## 2019-05-07 RX ADMIN — ACETAMINOPHEN 1000 MG: 500 TABLET, FILM COATED ORAL at 18:10

## 2019-05-07 RX ADMIN — OXYCODONE HYDROCHLORIDE 5 MG: 5 TABLET ORAL at 18:10

## 2019-05-07 RX ADMIN — ALVIMOPAN 12 MG: 12 CAPSULE ORAL at 08:41

## 2019-05-07 RX ADMIN — ENOXAPARIN SODIUM 40 MG: 40 INJECTION SUBCUTANEOUS at 10:15

## 2019-05-07 RX ADMIN — CETIRIZINE HYDROCHLORIDE 10 MG: 10 TABLET, FILM COATED ORAL at 10:15

## 2019-05-07 RX ADMIN — OXYCODONE HYDROCHLORIDE 5 MG: 5 TABLET ORAL at 22:59

## 2019-05-07 RX ADMIN — ACETAMINOPHEN 1000 MG: 500 TABLET, FILM COATED ORAL at 08:41

## 2019-05-07 RX ADMIN — MORPHINE SULFATE 2 MG: 2 INJECTION, SOLUTION INTRAMUSCULAR; INTRAVENOUS at 14:03

## 2019-05-07 RX ADMIN — MORPHINE SULFATE 2 MG: 2 INJECTION, SOLUTION INTRAMUSCULAR; INTRAVENOUS at 08:42

## 2019-05-07 RX ADMIN — ALVIMOPAN 12 MG: 12 CAPSULE ORAL at 21:00

## 2019-05-07 NOTE — PROGRESS NOTES
[facility] Medicine Services  PROGRESS NOTE    Patient Name: Arie Hayden  : 1980  MRN: [id number]    Date of Admission: 2019  Length of Stay: 1  Primary Care Physician: Maira Dawkins PA-C    Subjective   Subjective     CC:  LAR per CRS    HPI:  Doing well, walking, no nausea since early morning.   On full liquids.     Review of Systems   Gen- No fevers, chills  CV- No chest pain, palpitations  Resp- No cough, dyspnea  GI- No N/V/D, abd pain    Otherwise ROS is negative except as mentioned in the HPI.    Objective   Objective     Vital Signs:   Temp:  [96.9 °F (36.1 °C)-99 °F (37.2 °C)] 98.2 °F (36.8 °C)  Heart Rate:  [59-88] 80  Resp:  [16] 16  BP: (121-145)/(76-92) 138/92  FiO2 (%):  [21 %] 21 %        Physical Exam:  Gen:  WD/WN man in NAD up in chair, using IS.   Neuro: alert and oriented, clear speech, follows commands, grossly nonfocal  HEENT:  NC/AT PERRL, OP benign  Neck:  Supple, no LAD  Heart RRR no murmur, rub, or gallop  luns CTA nonlabored  Abd:  Soft, approp tender,   Extrem:  No c/c/e    Results Reviewed:  I have personally reviewed current lab, radiology, and data and agree.    Results from last 7 days   Lab Units 19  0453 19  1521   WBC 10*3/mm3 11.83* 6.44   HEMOGLOBIN g/dL 13.6 15.2   HEMATOCRIT % 41.2 45.4   PLATELETS 10*3/mm3 241 248     Results from last 7 days   Lab Units 19  0453 19  1521   SODIUM mmol/L 135* 140   POTASSIUM mmol/L 4.2 3.9   CHLORIDE mmol/L 98 102   CO2 mmol/L 22.0 24.0   BUN mg/dL 7 12   CREATININE mg/dL 0.72* 0.73*   GLUCOSE mg/dL 114* 97   CALCIUM mg/dL 8.3* 9.0     Estimated Creatinine Clearance: 164.9 mL/min (A) (by C-G formula based on SCr of 0.72 mg/dL (L)).    No results found for: BNP    Microbiology Results Abnormal     None          Imaging Results (last 24 hours)     ** No results found for the last 24 hours. **               I have reviewed the medications:    Current Facility-Administered  "Medications:   •  acetaminophen (TYLENOL) tablet 1,000 mg, 1,000 mg, Oral, Q8H, Mitchel Beck MD, 1,000 mg at 05/07/19 0841  •  alvimopan (ENTEREG) capsule 12 mg, 12 mg, Oral, BID, Mitchel Beck MD, 12 mg at 05/07/19 0841  •  cetirizine (zyrTEC) tablet 10 mg, 10 mg, Oral, Daily, Mitchel Beck MD, 10 mg at 05/07/19 1015  •  diazePAM (VALIUM) tablet 5 mg, 5 mg, Oral, Q6H PRN, Mitchel Beck MD  •  enoxaparin (LOVENOX) syringe 40 mg, 40 mg, Subcutaneous, Daily, Mitchel Beck MD, 40 mg at 05/07/19 1015  •  lactated ringers infusion, 9 mL/hr, Intravenous, Continuous PRN, Montana Patricia MD, Last Rate: 9 mL/hr at 05/06/19 0758, 9 mL/hr at 05/06/19 0758  •  morphine injection 2 mg, 2 mg, Intravenous, Q1H PRN, 2 mg at 05/07/19 0842 **AND** naloxone (NARCAN) injection 0.4 mg, 0.4 mg, Intravenous, Q5 Min PRN, Mitchel Beck MD  •  ondansetron (ZOFRAN) injection 4 mg, 4 mg, Intravenous, Q6H PRN, Mitchel Beck MD, 4 mg at 05/07/19 0447  •  oxyCODONE (ROXICODONE) immediate release tablet 5 mg, 5 mg, Oral, Q4H PRN, Mitchel Beck MD  •  Scopolamine (TRANSDERM-SCOP) 1.5 MG/3DAYS patch 1 patch, 1 patch, Transdermal, Continuous, Mitchel Beck MD, 1 patch at 05/06/19 0742      Assessment/Plan   Assessment / Plan     Active Hospital Problems    Diagnosis POA   • **Diverticulitis [K57.92] Yes   • Sleep apnea [G47.30] Unknown     wears CPAP \"sometimes\"     • Rosacea [L71.9] Unknown   • Alcohol use [Z78.9] Unknown     Reports 8-10 drinks a week.  No hx of w/d or seizures.  Last drink was Saturday 5/4/19     • Tobacco abuse [Z72.0] Yes          Brief Hospital Course to date:  Arie Hayden is a 39 y.o. male s/p LAR on 5/6.     A/p    Diverticulitis  Recent diverticulitis with abscess 1/2019  --s/p LAR, appendectomy, excision and closure of colovesical fistula, umbilical hernia repair, proctoscopy today 5/6  --Prior admit in January as above.  Underwent CT-guided abscess drainage and home with PICC " "line and extended IV antibiotic course.  Managed by Dr. Anthony Thomson with [facility].  Completed antibiotics in February.  --POD 1  --Postop care per CRS     Hx ANGELIQUE with CPAP use  --Patient reports he uses his CPAP \"sometimes\".  Currently has his mask at bedside asking for CPAP use.  --We will order CPAP per RT.  --Oxygen as needed     Alcohol use  --Admits to 8-10 drinks a week.  Denies ever having withdrawal issues or seizures.  Last drink Saturday, 5/4/2019.     Tobacco abuse  --Admits to approximately 3-4 cigarettes a day.  --Declines nicotine patch  --Assist/advise         DVT Prophylaxis:  lovenox    Disposition: I expect the patient to be discharged tbd.    CODE STATUS:   Code Status and Medical Interventions:   Ordered at: 05/06/19 1508     Code Status:    CPR     Medical Interventions (Level of Support Prior to Arrest):    Full         Electronically signed by Radha Ram MD, 05/07/19, 11:15 AM.      "

## 2019-05-07 NOTE — PLAN OF CARE
Problem: Patient Care Overview  Goal: Plan of Care Review  Outcome: Ongoing (interventions implemented as appropriate)   05/07/19 0577   Coping/Psychosocial   Plan of Care Reviewed With patient   Plan of Care Review   Progress improving   OTHER   Outcome Summary VSS, Pt c/o n/v zofran and phenegran given prn, ambulated in the mandujano x1. will continue to monitor.

## 2019-05-07 NOTE — PROGRESS NOTES
Discharge Planning Assessment  [facility]     Patient Name: Arie Hayden  MRN: [id number]  Today's Date: 5/7/2019    Admit Date: 5/6/2019    Discharge Needs Assessment     Row Name 05/07/19 1425       Living Environment    Lives With  spouse    Name(s) of Who Lives With Patient  Donna Hayden(spouse)    Current Living Arrangements  home/apartment/condo    Primary Care Provided by  self    Provides Primary Care For  no one    Family Caregiver if Needed  spouse    Quality of Family Relationships  involved;supportive    Able to Return to Prior Arrangements  yes    Living Arrangement Comments  Spoke with pt in room with permission in regards to discharge planning. Pt's mother present.  Pt resides in [place] with spouse       Resource/Environmental Concerns    Resource/Environmental Concerns  none       Transition Planning    Patient/Family Anticipates Transition to  home with family    Patient/Family Anticipated Services at Transition      Transportation Anticipated  car, drives self;family or friend will provide       Discharge Needs Assessment    Readmission Within the Last 30 Days  no previous admission in last 30 days    Concerns to be Addressed  denies needs/concerns at this time    Equipment Currently Used at Home  bipap/cpap    Anticipated Changes Related to Illness  other (see comments) Post op recovery time    Equipment Needed After Discharge  bipap/cpap    Discharge Coordination/Progress  Pt has [employer] insurance and denies recent changes in insurance. Pt has prescription coverage and uses [facility] in [place].  Plan is home with spouse at discharge. Pt denies discharge needs at this time. CM will cont to follow,        Discharge Plan     Row Name 05/07/19 1428       Plan    Plan  discharge plan    Patient/Family in Agreement with Plan  yes    Plan Comments  Plan is home with spouse at discharge. Pt currently denies discharge needs.  CM will cont to follow.         Destination      No service coordination in this encounter.      Durable Medical Equipment      No service coordination in this encounter.      Dialysis/Infusion      No service coordination in this encounter.      Home Medical Care      No service coordination in this encounter.      Therapy      No service coordination in this encounter.      Community Resources      No service coordination in this encounter.        Expected Discharge Date and Time     Expected Discharge Date Expected Discharge Time    May 10, 2019         Demographic Summary     Row Name 05/07/19 1424       General Information    General Information Comments  Pt's PCP is Maira Dawkins       Contact Information    Permission Granted to Share Info With      Contact Information Obtained for      Contact Information Comments  Donna Hayden(spouse):  [phone]        Functional Status     Row Name 05/07/19 1424       Functional Status    Functional Status Comments  Pt is independent of ADLs        Psychosocial    No documentation.       Abuse/Neglect    No documentation.       Legal    No documentation.       Substance Abuse    No documentation.       Patient Forms    No documentation.           Jenn Anthony RN

## 2019-05-08 VITALS
TEMPERATURE: 98 F | OXYGEN SATURATION: 96 % | HEART RATE: 69 BPM | RESPIRATION RATE: 18 BRPM | SYSTOLIC BLOOD PRESSURE: 121 MMHG | DIASTOLIC BLOOD PRESSURE: 90 MMHG

## 2019-05-08 LAB
ANION GAP SERPL CALCULATED.3IONS-SCNC: 14 MMOL/L
BUN BLD-MCNC: 6 MG/DL (ref 6–20)
BUN/CREAT SERPL: 9.5 (ref 7–25)
CALCIUM SPEC-SCNC: 8.6 MG/DL (ref 8.6–10.5)
CHLORIDE SERPL-SCNC: 98 MMOL/L (ref 98–107)
CO2 SERPL-SCNC: 24 MMOL/L (ref 22–29)
CREAT BLD-MCNC: 0.63 MG/DL (ref 0.76–1.27)
CYTO UR: NORMAL
DEPRECATED RDW RBC AUTO: 49.5 FL (ref 37–54)
ERYTHROCYTE [DISTWIDTH] IN BLOOD BY AUTOMATED COUNT: 14.3 % (ref 12.3–15.4)
GFR SERPL CREATININE-BSD FRML MDRD: 141 ML/MIN/1.73
GLUCOSE BLD-MCNC: 107 MG/DL (ref 65–99)
HCT VFR BLD AUTO: 38.8 % (ref 37.5–51)
HGB BLD-MCNC: 13 G/DL (ref 13–17.7)
LAB AP CASE REPORT: NORMAL
LAB AP CLINICAL INFORMATION: NORMAL
MCH RBC QN AUTO: 31.6 PG (ref 26.6–33)
MCHC RBC AUTO-ENTMCNC: 33.5 G/DL (ref 31.5–35.7)
MCV RBC AUTO: 94.2 FL (ref 79–97)
PATH REPORT.FINAL DX SPEC: NORMAL
PATH REPORT.GROSS SPEC: NORMAL
PLATELET # BLD AUTO: 183 10*3/MM3 (ref 140–450)
PMV BLD AUTO: 10.6 FL (ref 6–12)
POTASSIUM BLD-SCNC: 3.8 MMOL/L (ref 3.5–5.2)
RBC # BLD AUTO: 4.12 10*6/MM3 (ref 4.14–5.8)
SODIUM BLD-SCNC: 136 MMOL/L (ref 136–145)
WBC NRBC COR # BLD: 8.76 10*3/MM3 (ref 3.4–10.8)

## 2019-05-08 PROCEDURE — 80048 BASIC METABOLIC PNL TOTAL CA: CPT | Performed by: INTERNAL MEDICINE

## 2019-05-08 PROCEDURE — 94660 CPAP INITIATION&MGMT: CPT

## 2019-05-08 PROCEDURE — 99239 HOSP IP/OBS DSCHRG MGMT >30: CPT | Performed by: INTERNAL MEDICINE

## 2019-05-08 PROCEDURE — 94799 UNLISTED PULMONARY SVC/PX: CPT

## 2019-05-08 PROCEDURE — 85027 COMPLETE CBC AUTOMATED: CPT | Performed by: INTERNAL MEDICINE

## 2019-05-08 PROCEDURE — 25010000002 ENOXAPARIN PER 10 MG: Performed by: COLON & RECTAL SURGERY

## 2019-05-08 RX ORDER — DOCUSATE SODIUM 100 MG/1
100 CAPSULE, LIQUID FILLED ORAL DAILY PRN
Qty: 30 CAPSULE | Refills: 1 | Status: SHIPPED | OUTPATIENT
Start: 2019-05-08 | End: 2020-09-17

## 2019-05-08 RX ORDER — OXYCODONE HYDROCHLORIDE 5 MG/1
5 TABLET ORAL EVERY 4 HOURS PRN
Qty: 20 TABLET | Refills: 0 | Status: SHIPPED | OUTPATIENT
Start: 2019-05-08 | End: 2019-05-16

## 2019-05-08 RX ADMIN — ALVIMOPAN 12 MG: 12 CAPSULE ORAL at 08:55

## 2019-05-08 RX ADMIN — CETIRIZINE HYDROCHLORIDE 10 MG: 10 TABLET, FILM COATED ORAL at 08:55

## 2019-05-08 RX ADMIN — ACETAMINOPHEN 1000 MG: 500 TABLET, FILM COATED ORAL at 08:55

## 2019-05-08 RX ADMIN — ENOXAPARIN SODIUM 40 MG: 40 INJECTION SUBCUTANEOUS at 08:54

## 2019-05-08 NOTE — DISCHARGE SUMMARY
[facility] Medicine Services  DISCHARGE SUMMARY    Patient Name: Arie Hayden  : 1980  MRN: [id number]    Date of Admission: 2019  Date of Discharge:  2019  Primary Care Physician: Maira Dawkisn PA-C    Consults     Date and Time Order Name Status Description    2019 1508 Inpatient Hospitalist Consult Completed           Hospital Course     Presenting Problem:   Diverticulitis [K57.92]    Active Hospital Problems    Diagnosis  POA   • **Diverticulitis [K57.92]  Yes   • Sleep apnea [G47.30]  Unknown   • Rosacea [L71.9]  Unknown   • Alcohol use [Z78.9]  Unknown   • Tobacco abuse [Z72.0]  Yes      Resolved Hospital Problems   No resolved problems to display.          Hospital Course:  Arie Hayden is a 39 y.o. male admiktted for resection of cecal fistula.  On   he underwent   Low anterior resection, appendectomy, takedown of splenic flexure, excision and closure of colocecal fistula, umbilical hernia repair, & proctoscopy without complication.  He did well postop and is discharged on POD 2, tolerating food and in minimal pain witih ambulation.      He will follow up in one week for staple removal.  Hgb at discharge is 13.      Discharge Follow Up Recommendations for labs/diagnostics:   *per surgeon.     Day of Discharge     HPI:   Feels fine, no pain at rest, tolerating food, breathing well.     Review of Systems   Gen- No fevers, chills  CV- No chest pain, palpitations  Resp- No cough, dyspnea  GI- No N/V/D, abd pain    Otherwise ROS is negative except as mentioned in the HPI.    Vital Signs:   Temp:  [98 °F (36.7 °C)-98.4 °F (36.9 °C)] 98 °F (36.7 °C)  Heart Rate:  [66-73] 69  Resp:  [18] 18  BP: (118-144)/(82-91) 121/90  FiO2 (%):  [21 %] 21 %     Physical Exam:  Gen:  WD/WN in bed this morning.   Neuro: alert and oriented, clear speech, follows commands, grossly nonfocal  HEENT:  NC/AT PERRL, OP benign  Neck:  Supple, no LAD  Heart RRR no murmur,  rub, or gallop  Lungs CTA nonlabored  Abd:  Soft, min tender, no rebound or guarding, pos BS.  Midline incision is dressed.   Extrem:  No c/c/e      Pertinent  and/or Most Recent Results     Results from last 7 days   Lab Units 05/08/19  0409 05/07/19  0453 05/02/19  1521   WBC 10*3/mm3 8.76 11.83* 6.44   HEMOGLOBIN g/dL 13.0 13.6 15.2   HEMATOCRIT % 38.8 41.2 45.4   PLATELETS 10*3/mm3 183 241 248   SODIUM mmol/L 136 135* 140   POTASSIUM mmol/L 3.8 4.2 3.9   CHLORIDE mmol/L 98 98 102   CO2 mmol/L 24.0 22.0 24.0   BUN mg/dL 6 7 12   CREATININE mg/dL 0.63* 0.72* 0.73*   GLUCOSE mg/dL 107* 114* 97   CALCIUM mg/dL 8.6 8.3* 9.0           Invalid input(s): PROT, LABALBU        Invalid input(s): TG, LDLCALC, LDLREALC  Results from last 7 days   Lab Units 05/02/19  1521   HEMOGLOBIN A1C % 4.90       Brief Urine Lab Results  (Last result in the past 365 days)      Color   Clarity   Blood   Leuk Est   Nitrite   Protein   CREAT   Urine HCG        01/08/19 1846 Yellow Clear Negative Negative Negative Negative               Microbiology Results Abnormal     None          Imaging Results (all)     None           Order Current Status    Tissue Pathology Exam In process        Discharge Details        Discharge Medications      New Medications      Instructions Start Date   docusate sodium 100 MG capsule  Commonly known as:  COLACE   100 mg, Oral, Daily PRN      oxyCODONE 5 MG immediate release tablet  Commonly known as:  ROXICODONE   5 mg, Oral, Every 4 Hours PRN         Continue These Medications      Instructions Start Date   CLARITIN 10 MG tablet  Generic drug:  loratadine   10 mg, Oral, Daily      RHOFADE 1 % cream  Generic drug:  Oxymetazoline HCl   1 application, Apply externally, Every Morning             Allergies   Allergen Reactions   • Pollen Extract Other (See Comments)     Sinus related         Discharge Disposition:  Home or Self Care    Discharge Diet:  Diet Order   Procedures   • Diet Soft Texture; [employer]          Discharge Activity:  No heavy lifting; no driving while impaired by opioids.    CODE STATUS:    Code Status and Medical Interventions:   Ordered at: 05/06/19 1508     Code Status:    CPR     Medical Interventions (Level of Support Prior to Arrest):    Full         Future Appointments   Date Time Provider Department Center   8/26/2019  9:00 AM Maira Dawkins PA-C [facility] Dr svetich in one week for wound check.     Time Spent on Discharge:  40 minutes    Electronically signed by Radha Ram MD, 05/08/19, 9:29 AM.

## 2019-05-08 NOTE — PROGRESS NOTES
Colon and Rectal [[facility]]    POD # 1    /82   Pulse 72   Temp 98 °F (36.7 °C) (Oral)   Resp 18   SpO2 96%     Lab Results (last 24 hours)     Procedure Component Value Units Date/Time    CBC & Differential [[id number]] Collected:  05/07/19 0453    Specimen:  Blood Updated:  05/07/19 0807    Narrative:       The following orders were created for panel order CBC & Differential.  Procedure                               Abnormality         Status                     ---------                               -----------         ------                     CBC Auto Differential[[id number]]        Abnormal            Final result                 Please view results for these tests on the individual orders.    CBC Auto Differential [[id number]]  (Abnormal) Collected:  05/07/19 0453    Specimen:  Blood Updated:  05/07/19 0807     WBC 11.83 10*3/mm3      RBC 4.37 10*6/mm3      Hemoglobin 13.6 g/dL      Hematocrit 41.2 %      MCV 94.3 fL      MCH 31.1 pg      MCHC 33.0 g/dL      RDW 14.4 %      RDW-SD 49.3 fl      MPV 10.8 fL      Platelets 241 10*3/mm3      Neutrophil % 77.8 %      Lymphocyte % 9.2 %      Monocyte % 13.0 %      Eosinophil % 0.0 %      Basophil % 0.0 %      Immature Grans % 0.2 %      Neutrophils, Absolute 9.20 10*3/mm3      Lymphocytes, Absolute 1.09 10*3/mm3      Monocytes, Absolute 1.54 10*3/mm3      Eosinophils, Absolute 0.00 10*3/mm3      Basophils, Absolute 0.00 10*3/mm3      Immature Grans, Absolute 0.02 10*3/mm3     Basic Metabolic Panel [[id number]]  (Abnormal) Collected:  05/07/19 0453    Specimen:  Blood Updated:  05/07/19 0636     Glucose 114 mg/dL      BUN 7 mg/dL      Creatinine 0.72 mg/dL      Sodium 135 mmol/L      Potassium 4.2 mmol/L      Chloride 98 mmol/L      CO2 22.0 mmol/L      Calcium 8.3 mg/dL      eGFR Non African Amer 121 mL/min/1.73      BUN/Creatinine Ratio 9.7     Anion Gap 15.0 mmol/L     Narrative:       GFR Normal >60  Chronic Kidney Disease <60  Kidney Failure <15           I/O this shift:  In: -   Out: 700 [Urine:700]    Alert and oriented.  No nausea or vomiting.  Good pain control  Good UO. Labs stable  Positive flatus   stable post op course.  Increase diet and home tomorrow.  Pathology pending.    Order Name Source Comment Collection Info Order Time   TISSUE PATHOLOGY EXAM Large Intestine, Sigmoid Colon  Collected By: Mitchel Beck MD 5/6/2019 10:48 AM   .    Mitchel Beck MD  05/07/19  10:04 PM

## 2019-05-08 NOTE — PLAN OF CARE
Problem: Patient Care Overview  Goal: Interprofessional Rounds/Family Conf   05/08/19 0420   Interdisciplinary Rounds/Family Conf   Participants nursing

## 2019-05-08 NOTE — PROGRESS NOTES
Case Management Discharge Note    Final Note: Plan is home with spouse. Pt denies discharge needs.     Destination      No service has been selected for the patient.      Durable Medical Equipment      No service has been selected for the patient.      Dialysis/Infusion      No service has been selected for the patient.      Home Medical Care      No service has been selected for the patient.      Therapy      No service has been selected for the patient.      Community Resources      No service has been selected for the patient.             Final Discharge Disposition Code: 01 - home or self-care

## 2019-05-09 ENCOUNTER — TRANSITIONAL CARE MANAGEMENT TELEPHONE ENCOUNTER (OUTPATIENT)
Dept: INTERNAL MEDICINE | Facility: CLINIC | Age: 39
End: 2019-05-09

## 2019-05-09 NOTE — OUTREACH NOTE
NNEKA call completed.  Please refer to TCM call flowsheet for call documentation.  Patient reports that he is doing well.  He is on a soft diet.  He denies n/v/d/c and questions regarding meds.  Appointment confirmed.

## 2019-05-15 ENCOUNTER — OFFICE VISIT (OUTPATIENT)
Dept: INTERNAL MEDICINE | Facility: CLINIC | Age: 39
End: 2019-05-15

## 2019-05-15 VITALS
SYSTOLIC BLOOD PRESSURE: 106 MMHG | HEART RATE: 80 BPM | HEIGHT: 72 IN | BODY MASS INDEX: 27.22 KG/M2 | DIASTOLIC BLOOD PRESSURE: 68 MMHG | WEIGHT: 201 LBS

## 2019-05-15 DIAGNOSIS — J30.9 ALLERGIC RHINITIS, UNSPECIFIED SEASONALITY, UNSPECIFIED TRIGGER: ICD-10-CM

## 2019-05-15 DIAGNOSIS — Z09 HOSPITAL DISCHARGE FOLLOW-UP: Primary | ICD-10-CM

## 2019-05-15 DIAGNOSIS — G47.33 OBSTRUCTIVE SLEEP APNEA SYNDROME: Chronic | ICD-10-CM

## 2019-05-15 PROCEDURE — 99495 TRANSJ CARE MGMT MOD F2F 14D: CPT | Performed by: PHYSICIAN ASSISTANT

## 2019-05-15 NOTE — PROGRESS NOTES
Transitional Care Follow Up Visit  Subjective     Arie Hayden is a 39 y.o. male who presents for a transitional care management visit.    Within 48 business hours after discharge our office contacted him via telephone to coordinate his care and needs.      I reviewed and discussed the details of that call along with the discharge summary, hospital problems, inpatient lab results, inpatient diagnostic studies, and consultation reports with Arie.     Current outpatient and discharge medications have been reconciled for the patient.    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  He is doing very well actually walked a mile this morning.  Bowel movement today was starting to return to normal.  He has had no fever or chills.  No nausea or vomiting.  He is avoiding any heavy lifting pushing or pulling.  He sees Dr. Beck on Friday for staple removal.     The following portions of the patient's history were reviewed and updated as appropriate: allergies, current medications, past family history, past medical history, past social history and past surgical history.    Review of Systems   Constitutional: Negative.    Respiratory: Negative.    Cardiovascular: Negative.    Gastrointestinal: Negative for abdominal distention, abdominal pain, blood in stool, constipation, diarrhea, nausea, rectal pain, vomiting, GERD and indigestion.        Objective   Physical Exam   Constitutional: He is oriented to person, place, and time. He appears well-developed and well-nourished.   Neck: Normal range of motion.  Neck supple.   Cardiovascular: Normal rate, regular rhythm and normal heart sounds.   Pulmonary/Chest: Effort normal and breath sounds normal.   Lymphadenopathy:     He has no cervical adenopathy.   Neurological: He is alert and oriented to person, place, and time.   Skin: Skin is warm and dry.   Surgical site is clean and no signs of infection.   Psychiatric: He has a normal mood and affect. His behavior is normal. Judgment and thought content normal.   Nursing note and vitals reviewed.       Assessment/Plan     Arie was seen today for transitional care management.    Diagnoses and all orders for this visit:    Hospital discharge follow-up  Comments:  Doing very well following surgery.    Obstructive sleep apnea syndrome  Comments:  Stable at this time.    Allergic rhinitis, unspecified seasonality, unspecified trigger  Comments:  Stable on Claritin.       Patient Instructions   Continue current medications.  Continue to avoid any heavy lifting pushing or pulling.  Call for any fever chills nausea or vomiting.  Follow-up with Dr. Beck on Friday as scheduled.    Follow-up in August as scheduled.

## 2019-05-15 NOTE — PATIENT INSTRUCTIONS
Continue current medications.  Continue to avoid any heavy lifting pushing or pulling.  Call for any fever chills nausea or vomiting.  Follow-up with Dr. Beck on Friday as scheduled.    Follow-up in August as scheduled.

## 2019-08-26 ENCOUNTER — LAB (OUTPATIENT)
Dept: LAB | Facility: HOSPITAL | Age: 39
End: 2019-08-26

## 2019-08-26 ENCOUNTER — OFFICE VISIT (OUTPATIENT)
Dept: INTERNAL MEDICINE | Facility: CLINIC | Age: 39
End: 2019-08-26

## 2019-08-26 VITALS
BODY MASS INDEX: 28.85 KG/M2 | SYSTOLIC BLOOD PRESSURE: 136 MMHG | DIASTOLIC BLOOD PRESSURE: 80 MMHG | HEIGHT: 72 IN | WEIGHT: 213 LBS | HEART RATE: 84 BPM

## 2019-08-26 DIAGNOSIS — Z12.5 SCREENING PSA (PROSTATE SPECIFIC ANTIGEN): ICD-10-CM

## 2019-08-26 DIAGNOSIS — Z00.00 ENCOUNTER FOR PREVENTIVE HEALTH EXAMINATION: Primary | ICD-10-CM

## 2019-08-26 DIAGNOSIS — J30.9 ALLERGIC RHINITIS, UNSPECIFIED SEASONALITY, UNSPECIFIED TRIGGER: ICD-10-CM

## 2019-08-26 DIAGNOSIS — G47.33 OBSTRUCTIVE SLEEP APNEA SYNDROME: Chronic | ICD-10-CM

## 2019-08-26 DIAGNOSIS — Z72.0 TOBACCO ABUSE: ICD-10-CM

## 2019-08-26 DIAGNOSIS — Z00.00 ENCOUNTER FOR PREVENTIVE HEALTH EXAMINATION: ICD-10-CM

## 2019-08-26 LAB
ALBUMIN SERPL-MCNC: 4.1 G/DL (ref 3.5–5.2)
ALBUMIN/GLOB SERPL: 1.2 G/DL
ALP SERPL-CCNC: 51 U/L (ref 39–117)
ALT SERPL W P-5'-P-CCNC: 30 U/L (ref 1–41)
ANION GAP SERPL CALCULATED.3IONS-SCNC: 13 MMOL/L (ref 5–15)
AST SERPL-CCNC: 22 U/L (ref 1–40)
BASOPHILS # BLD AUTO: 0.07 10*3/MM3 (ref 0–0.2)
BASOPHILS NFR BLD AUTO: 1.7 % (ref 0–1.5)
BILIRUB SERPL-MCNC: 0.2 MG/DL (ref 0.2–1.2)
BUN BLD-MCNC: 6 MG/DL (ref 6–20)
BUN/CREAT SERPL: 7.1 (ref 7–25)
CALCIUM SPEC-SCNC: 9 MG/DL (ref 8.6–10.5)
CHLORIDE SERPL-SCNC: 102 MMOL/L (ref 98–107)
CHOLEST SERPL-MCNC: 183 MG/DL (ref 0–200)
CO2 SERPL-SCNC: 27 MMOL/L (ref 22–29)
CREAT BLD-MCNC: 0.84 MG/DL (ref 0.76–1.27)
DEPRECATED RDW RBC AUTO: 50.4 FL (ref 37–54)
EOSINOPHIL # BLD AUTO: 0.07 10*3/MM3 (ref 0–0.4)
EOSINOPHIL NFR BLD AUTO: 1.7 % (ref 0.3–6.2)
ERYTHROCYTE [DISTWIDTH] IN BLOOD BY AUTOMATED COUNT: 14 % (ref 12.3–15.4)
GFR SERPL CREATININE-BSD FRML MDRD: 101 ML/MIN/1.73
GLOBULIN UR ELPH-MCNC: 3.4 GM/DL
GLUCOSE BLD-MCNC: 86 MG/DL (ref 65–99)
HCT VFR BLD AUTO: 47.5 % (ref 37.5–51)
HDLC SERPL-MCNC: 93 MG/DL (ref 40–60)
HGB BLD-MCNC: 15.4 G/DL (ref 13–17.7)
IMM GRANULOCYTES # BLD AUTO: 0.01 10*3/MM3 (ref 0–0.05)
IMM GRANULOCYTES NFR BLD AUTO: 0.2 % (ref 0–0.5)
LDLC SERPL CALC-MCNC: 75 MG/DL (ref 0–100)
LDLC/HDLC SERPL: 0.81 {RATIO}
LYMPHOCYTES # BLD AUTO: 1.3 10*3/MM3 (ref 0.7–3.1)
LYMPHOCYTES NFR BLD AUTO: 31 % (ref 19.6–45.3)
MCH RBC QN AUTO: 31.3 PG (ref 26.6–33)
MCHC RBC AUTO-ENTMCNC: 32.4 G/DL (ref 31.5–35.7)
MCV RBC AUTO: 96.5 FL (ref 79–97)
MONOCYTES # BLD AUTO: 0.45 10*3/MM3 (ref 0.1–0.9)
MONOCYTES NFR BLD AUTO: 10.7 % (ref 5–12)
NEUTROPHILS # BLD AUTO: 2.29 10*3/MM3 (ref 1.7–7)
NEUTROPHILS NFR BLD AUTO: 54.7 % (ref 42.7–76)
NRBC BLD AUTO-RTO: 0 /100 WBC (ref 0–0.2)
PLATELET # BLD AUTO: 240 10*3/MM3 (ref 140–450)
PMV BLD AUTO: 10.7 FL (ref 6–12)
POTASSIUM BLD-SCNC: 4.4 MMOL/L (ref 3.5–5.2)
PROT SERPL-MCNC: 7.5 G/DL (ref 6–8.5)
PSA SERPL-MCNC: 0.82 NG/ML (ref 0–4)
RBC # BLD AUTO: 4.92 10*6/MM3 (ref 4.14–5.8)
SODIUM BLD-SCNC: 142 MMOL/L (ref 136–145)
TRIGL SERPL-MCNC: 74 MG/DL (ref 0–150)
VLDLC SERPL-MCNC: 14.8 MG/DL (ref 5–40)
WBC NRBC COR # BLD: 4.19 10*3/MM3 (ref 3.4–10.8)

## 2019-08-26 PROCEDURE — 85025 COMPLETE CBC W/AUTO DIFF WBC: CPT

## 2019-08-26 PROCEDURE — 80061 LIPID PANEL: CPT

## 2019-08-26 PROCEDURE — G0103 PSA SCREENING: HCPCS

## 2019-08-26 PROCEDURE — 99396 PREV VISIT EST AGE 40-64: CPT | Performed by: PHYSICIAN ASSISTANT

## 2019-08-26 PROCEDURE — 80053 COMPREHEN METABOLIC PANEL: CPT

## 2019-08-26 RX ORDER — DOXYCYCLINE 100 MG/1
CAPSULE ORAL
Refills: 3 | COMMUNITY
Start: 2019-07-05 | End: 2020-09-17

## 2019-08-26 NOTE — PROGRESS NOTES
"Patient Care Team:  Maira Dawkins PA-C as PCP - General (Internal Medicine)    Chief Complaint;:   Chief Complaint   Patient presents with   • Annual Exam     patient is fasting        Subjective     HPI    Past Medical History:   Diagnosis Date   • Allergic rhinitis    • Diverticulitis 01/15/2019   • Diverticulosis    • Environmental allergies    • Fatigue    • Rosacea    • Sleep apnea     wears CPAP \"sometimes\"   • Snores        Past Surgical History:   Procedure Laterality Date   • COLON RESECTION N/A 5/6/2019    Procedure: LOWER ANTERIOR RESECTION, APPENDECTOMY, TAKEDOWN OF SPLENIC FLEXURE, EXCISION AND CLOSURE OF COLOCECAL FISTULA, UMBILICAL HERNIA REPAIR, AND PROCTOSCOPY;  Surgeon: Mitchel Beck MD;  [facility];  Service: General   • COLONOSCOPY     • CT ABSCESS DRAIN CYST ASP     • TONSILLECTOMY  1987   • VASECTOMY  2011   • WISDOM TOOTH EXTRACTION         Family History   Problem Relation Age of Onset   • Factor V Leiden deficiency Father    • Suicidality Father    • Factor IX deficiency Father    • Breast cancer Maternal Grandmother    • Lung cancer Maternal Grandfather    • Factor V Leiden deficiency Paternal Grandfather    • Prostate cancer Paternal Grandfather    • Hyperlipidemia Mother    • Hypothyroidism Mother    • No Known Problems Sister    • No Known Problems Brother    • No Known Problems Daughter    • No Known Problems Daughter        Social History     Socioeconomic History   • Marital status:      Spouse name: Not on file   • Number of children: Not on file   • Years of education: Not on file   • Highest education level: Not on file   Tobacco Use   • Smoking status: Light Tobacco Smoker     Types: Cigarettes     Start date: 1/8/2004   • Smokeless tobacco: Former User   • Tobacco comment: occasionally    Substance and Sexual Activity   • Alcohol use: Yes     Comment: 8-10/week   • Drug use: No   • Sexual activity: Defer   Social History Narrative     and lives with " "wife.  No home oxgen.  No HH.  Uses cpap hs       Allergies   Allergen Reactions   • Pollen Extract Other (See Comments)     Sinus related       Review of Systems:     Review of Systems   Constitutional: Negative for chills, fatigue and fever.   HENT: Negative for congestion, ear pain and sinus pressure.    Respiratory: Negative for cough, chest tightness, shortness of breath and wheezing.    Cardiovascular: Negative for chest pain and palpitations.   Gastrointestinal: Positive for constipation. Negative for abdominal pain and blood in stool.   Skin: Negative for color change.   Allergic/Immunologic: Positive for environmental allergies.   Neurological: Negative for dizziness, speech difficulty and headache.   Psychiatric/Behavioral: Negative for decreased concentration. The patient is not nervous/anxious.        Vital Signs  Vitals:    08/26/19 0912   BP: 136/80   BP Location: Right arm   Patient Position: Sitting   Cuff Size: Adult   Pulse: 84   Weight: 96.6 kg (213 lb)   Height: 182.9 cm (72\")   PainSc: 0-No pain         Current Outpatient Medications:   •  loratadine (CLARITIN) 10 MG tablet, Take 10 mg by mouth Daily As Needed., Disp: , Rfl:   •  RHOFADE 1 % cream, Apply 1 application topically Every Morning., Disp: , Rfl: 2  •  docusate sodium (COLACE) 100 MG capsule, Take 1 capsule by mouth Daily As Needed for Constipation., Disp: 30 capsule, Rfl: 1  •  doxycycline (MONODOX) 100 MG capsule, , Disp: , Rfl: 3    Health Maintenance:  {Health Maintenance FU:98829}    Physical Exam:    Physical Exam     Results Review:    I reviewed the patient's new clinical results.    Assessment/Plan   Problem List Items Addressed This Visit     None        There are no Patient Instructions on file for this visit.  Counseled the patient on the following topics, {[facility] Counseling Topics:42594}  Plan of care reviewed with patient at the conclusion of today's visit. Education was provided regarding diagnosis, management, and any " prescribed or recommended OTC medications.Patient verbalizes understanding of and agreement with management plan.     Radha Avila LPN

## 2019-08-26 NOTE — PROGRESS NOTES
"Patient Care Team:  Maira Dawkins PA-C as PCP - General (Internal Medicine)    Chief Complaint;:   Chief Complaint   Patient presents with   • Annual Exam     patient is fasting        Subjective     HPI  40 yo white male presents for annual physical.   He is fasting.   Colonoscopy due in 2 years.   He continues to smoke occasionally.   He has sleep apnea but does not wear CPAP-he states he is going to try again.   He has a hx of allergies but stable.   He denies any chest pain, SOB, heartburn/indigestion.   Since his surgery BM will alternate with constipation and diarrhea.   He denies any abd discomfort.     Past Medical History:   Diagnosis Date   • Allergic rhinitis    • Diverticulitis 01/15/2019   • Diverticulosis    • Environmental allergies    • Fatigue    • Rosacea    • Sleep apnea     wears CPAP \"sometimes\"   • Snores        Past Surgical History:   Procedure Laterality Date   • COLON RESECTION N/A 5/6/2019    Procedure: LOWER ANTERIOR RESECTION, APPENDECTOMY, TAKEDOWN OF SPLENIC FLEXURE, EXCISION AND CLOSURE OF COLOCECAL FISTULA, UMBILICAL HERNIA REPAIR, AND PROCTOSCOPY;  Surgeon: Mitchel Beck MD;  [facility];  Service: General   • COLONOSCOPY     • CT ABSCESS DRAIN CYST ASP     • TONSILLECTOMY  1987   • VASECTOMY  2011   • WISDOM TOOTH EXTRACTION         Family History   Problem Relation Age of Onset   • Factor V Leiden deficiency Father    • Suicidality Father    • Factor IX deficiency Father    • Breast cancer Maternal Grandmother    • Lung cancer Maternal Grandfather    • Factor V Leiden deficiency Paternal Grandfather    • Prostate cancer Paternal Grandfather    • Hyperlipidemia Mother    • Hypothyroidism Mother    • No Known Problems Sister    • No Known Problems Brother    • No Known Problems Daughter    • No Known Problems Daughter        Social History     Socioeconomic History   • Marital status:      Spouse name: Not on file   • Number of children: Not on file   • Years " "of education: Not on file   • Highest education level: Not on file   Tobacco Use   • Smoking status: Light Tobacco Smoker     Types: Cigarettes     Start date: 1/8/2004   • Smokeless tobacco: Former User   • Tobacco comment: occasionally    Substance and Sexual Activity   • Alcohol use: Yes     Comment: 8-10/week   • Drug use: No   • Sexual activity: Defer   Social History Narrative     and lives with wife.  No home oxgen.  No HH.  Uses cpap hs       Allergies   Allergen Reactions   • Pollen Extract Other (See Comments)     Sinus related       Review of Systems:     Review of Systems   Constitutional: Negative.    HENT: Positive for rhinorrhea.    Respiratory: Negative for cough and shortness of breath.    Cardiovascular: Negative for chest pain, palpitations and leg swelling.   Gastrointestinal: Positive for constipation and diarrhea. Negative for abdominal pain, GERD and indigestion.   Musculoskeletal: Negative.    Skin: Negative.    Allergic/Immunologic: Positive for environmental allergies.   Psychiatric/Behavioral: Negative.        Vital Signs  Vitals:    08/26/19 0912   BP: 136/80   BP Location: Right arm   Patient Position: Sitting   Cuff Size: Adult   Pulse: 84   Weight: 96.6 kg (213 lb)   Height: 182.9 cm (72\")   PainSc: 0-No pain         Current Outpatient Medications:   •  loratadine (CLARITIN) 10 MG tablet, Take 10 mg by mouth Daily As Needed., Disp: , Rfl:   •  RHOFADE 1 % cream, Apply 1 application topically Every Morning., Disp: , Rfl: 2  •  docusate sodium (COLACE) 100 MG capsule, Take 1 capsule by mouth Daily As Needed for Constipation., Disp: 30 capsule, Rfl: 1  •  doxycycline (MONODOX) 100 MG capsule, , Disp: , Rfl: 3    Health Maintenance:  up to date.     Physical Exam:    Physical Exam   Constitutional: He is oriented to person, place, and time. He appears well-developed and well-nourished. No distress.   HENT:   Head: Normocephalic and atraumatic.   Mouth/Throat: Oropharynx is clear and " "moist.   Neck: Normal range of motion. Neck supple. No thyromegaly present.   Cardiovascular: Normal rate, regular rhythm and normal heart sounds.   Pulmonary/Chest: Effort normal and breath sounds normal.   Abdominal: Soft. Bowel sounds are normal. He exhibits no mass. There is no tenderness.   Musculoskeletal: Normal range of motion. He exhibits no edema, tenderness or deformity.   Lymphadenopathy:     He has no cervical adenopathy.   Neurological: He is alert and oriented to person, place, and time.   Skin: Skin is warm and dry. He is not diaphoretic.   Psychiatric: He has a normal mood and affect. His behavior is normal. Judgment and thought content normal.   Nursing note and vitals reviewed.       Results Review:    I reviewed the patient's new clinical results.    Assessment/Plan   Problem List Items Addressed This Visit        Respiratory    Sleep apnea (Chronic)    Overview     wears CPAP \"sometimes\"         Allergic rhinitis    Overview     Chronic allergic rhinitis, unspecified seasonality, unspecified trigger            Other    Tobacco abuse    Current Assessment & Plan     Continues to smoke \"occasionally\"           Other Visit Diagnoses     Encounter for preventive health examination    -  Primary    Fasting for labs.    Relevant Orders    CBC & Differential    Comprehensive Metabolic Panel    Lipid Panel    Screening PSA (prostate specific antigen)        Relevant Orders    PSA Screen        Patient Instructions   Check fasting labs.   Encouraged smoking cessation.  Also encourage CPAP use.    Counseled the patient on the following topics, healthy eating habits  Plan of care reviewed with patient at the conclusion of today's visit. Education was provided regarding diagnosis, management, and any prescribed or recommended OTC medications.Patient verbalizes understanding of and agreement with management plan.     Miara Dawkins PA-C  "

## 2020-09-16 ENCOUNTER — TELEPHONE (OUTPATIENT)
Dept: INTERNAL MEDICINE | Facility: CLINIC | Age: 40
End: 2020-09-16

## 2020-09-16 NOTE — TELEPHONE ENCOUNTER
PATIENT HAS A PHYSICAL APPOINTMENT TOMORROW AFTERNOON.  WOULD LIKE LAB ORDERS PUT IN.    CALL WHEN DONE

## 2020-09-16 NOTE — TELEPHONE ENCOUNTER
CALLED PATIENT BACK AND TOLD HIM [facility] WAS OFF TODAY.  LABS WILL HAVE TO WAIT UNTIL HE SEES HER.  PATIENT WAS AGREEABLE WITH THIS,

## 2020-09-16 NOTE — TELEPHONE ENCOUNTER
PATIENT WAS NOTIFIED THAT HIS LABS WILL NEED TO BE ORDERED AFTER HE SEES [facility].  SHE IS OUT TODAY.  PATIENT WAS AGREEABLE

## 2020-09-17 ENCOUNTER — LAB (OUTPATIENT)
Dept: LAB | Facility: HOSPITAL | Age: 40
End: 2020-09-17

## 2020-09-17 ENCOUNTER — OFFICE VISIT (OUTPATIENT)
Dept: INTERNAL MEDICINE | Facility: CLINIC | Age: 40
End: 2020-09-17

## 2020-09-17 VITALS
BODY MASS INDEX: 28.63 KG/M2 | HEART RATE: 86 BPM | OXYGEN SATURATION: 99 % | HEIGHT: 72 IN | WEIGHT: 211.4 LBS | TEMPERATURE: 98 F | SYSTOLIC BLOOD PRESSURE: 132 MMHG | DIASTOLIC BLOOD PRESSURE: 86 MMHG

## 2020-09-17 DIAGNOSIS — Z72.0 TOBACCO ABUSE: ICD-10-CM

## 2020-09-17 DIAGNOSIS — Z12.5 SCREENING PSA (PROSTATE SPECIFIC ANTIGEN): ICD-10-CM

## 2020-09-17 DIAGNOSIS — Z00.00 ENCOUNTER FOR PREVENTIVE HEALTH EXAMINATION: ICD-10-CM

## 2020-09-17 DIAGNOSIS — Z00.00 ENCOUNTER FOR PREVENTIVE HEALTH EXAMINATION: Primary | ICD-10-CM

## 2020-09-17 DIAGNOSIS — Z78.9 ALCOHOL USE: Chronic | ICD-10-CM

## 2020-09-17 DIAGNOSIS — K57.92 DIVERTICULITIS: ICD-10-CM

## 2020-09-17 DIAGNOSIS — R06.83 SNORES: ICD-10-CM

## 2020-09-17 DIAGNOSIS — Z13.220 LIPID SCREENING: ICD-10-CM

## 2020-09-17 DIAGNOSIS — L71.9 ROSACEA: Chronic | ICD-10-CM

## 2020-09-17 DIAGNOSIS — G47.33 OBSTRUCTIVE SLEEP APNEA SYNDROME: Chronic | ICD-10-CM

## 2020-09-17 PROCEDURE — 90632 HEPA VACCINE ADULT IM: CPT | Performed by: PHYSICIAN ASSISTANT

## 2020-09-17 PROCEDURE — 90471 IMMUNIZATION ADMIN: CPT | Performed by: PHYSICIAN ASSISTANT

## 2020-09-17 PROCEDURE — 99213 OFFICE O/P EST LOW 20 MIN: CPT | Performed by: PHYSICIAN ASSISTANT

## 2020-09-17 PROCEDURE — 99396 PREV VISIT EST AGE 40-64: CPT | Performed by: PHYSICIAN ASSISTANT

## 2020-09-17 PROCEDURE — 90715 TDAP VACCINE 7 YRS/> IM: CPT | Performed by: PHYSICIAN ASSISTANT

## 2020-09-17 PROCEDURE — 90472 IMMUNIZATION ADMIN EACH ADD: CPT | Performed by: PHYSICIAN ASSISTANT

## 2020-09-17 PROCEDURE — 80061 LIPID PANEL: CPT

## 2020-09-17 PROCEDURE — 85025 COMPLETE CBC W/AUTO DIFF WBC: CPT

## 2020-09-17 RX ORDER — ISOTRETINOIN 20 MG/1
1 CAPSULE, LIQUID FILLED ORAL WEEKLY
COMMUNITY
Start: 2020-08-28 | End: 2023-03-14

## 2020-09-17 NOTE — PROGRESS NOTES
Patient Care Team:  Rosemary Long PA-C as PCP - General (Internal Medicine)    Chief Complaint::   Chief Complaint   Patient presents with   • Annual Exam     He is fasting   • Allergies        Subjective     HPI  41 yo white male with history of diverticulitis with colon resection, rosacea, and seasonal allergies presents for annual physical.   He is fasting.   Colonoscopy due next year.  He continues to smoke occasionally.   He has sleep apnea but does not wear CPAP.  He has a hx of allergies and takes claritin 10mg daily.  He denies any chest pain, SOB, heartburn/indigestion.   Since his surgery BM will alternate with constipation and diarrhea.   He denies any abdominal discomfort.  Currently under the care of dermatology for myorisan for rosacea.        The following portions of the patient's history were reviewed and updated as appropriate: active problem list, medication list, allergies, family history, social history    Review of Systems:   Review of Systems   Constitutional: Negative for activity change, appetite change, chills, diaphoresis, fatigue, fever, unexpected weight gain and unexpected weight loss.   HENT: Negative for congestion, ear pain, hearing loss and sinus pressure.    Eyes: Negative for visual disturbance.   Respiratory: Negative for cough, chest tightness, shortness of breath and wheezing.    Cardiovascular: Negative for chest pain, palpitations and leg swelling.   Gastrointestinal: Negative for abdominal pain, blood in stool, constipation, GERD and indigestion.   Endocrine: Negative for cold intolerance and heat intolerance.   Genitourinary: Negative for dysuria and hematuria.   Musculoskeletal: Negative for arthralgias and myalgias.   Skin: Negative for color change and skin lesions.   Allergic/Immunologic: Negative for environmental allergies.   Neurological: Negative for dizziness, tremors, seizures, syncope, speech difficulty, weakness, headache, memory problem and confusion.  "  Hematological: Does not bruise/bleed easily.   Psychiatric/Behavioral: Negative for decreased concentration, sleep disturbance and depressed mood. The patient is not nervous/anxious.        Vital Signs  Vitals:    09/17/20 1507   BP: 132/86   BP Location: Left arm   Patient Position: Sitting   Cuff Size: Adult   Pulse: 86   Temp: 98 °F (36.7 °C)   SpO2: 99%   Weight: 95.9 kg (211 lb 6.4 oz)   Height: 182.9 cm (72\")   PainSc: 0-No pain     Body mass index is 28.67 kg/m².    Labs  Lab on 09/17/2020   Component Date Value Ref Range Status   • Total Cholesterol 09/17/2020 224* 0 - 200 mg/dL Final   • Triglycerides 09/17/2020 85  0 - 150 mg/dL Final   • HDL Cholesterol 09/17/2020 86* 40 - 60 mg/dL Final   • LDL Cholesterol  09/17/2020 121* 0 - 100 mg/dL Final   • VLDL Cholesterol 09/17/2020 17  5 - 40 mg/dL Final   • LDL/HDL Ratio 09/17/2020 1.41   Final   • WBC 09/17/2020 9.13  3.40 - 10.80 10*3/mm3 Final   • RBC 09/17/2020 4.87  4.14 - 5.80 10*6/mm3 Final   • Hemoglobin 09/17/2020 15.9  13.0 - 17.7 g/dL Final   • Hematocrit 09/17/2020 45.6  37.5 - 51.0 % Final   • MCV 09/17/2020 93.6  79.0 - 97.0 fL Final   • MCH 09/17/2020 32.6  26.6 - 33.0 pg Final   • MCHC 09/17/2020 34.9  31.5 - 35.7 g/dL Final   • RDW 09/17/2020 12.3  12.3 - 15.4 % Final   • RDW-SD 09/17/2020 42.5  37.0 - 54.0 fl Final   • MPV 09/17/2020 10.7  6.0 - 12.0 fL Final   • Platelets 09/17/2020 230  140 - 450 10*3/mm3 Final   • Neutrophil % 09/17/2020 70.0  42.7 - 76.0 % Final   • Lymphocyte % 09/17/2020 18.8* 19.6 - 45.3 % Final   • Monocyte % 09/17/2020 9.3  5.0 - 12.0 % Final   • Eosinophil % 09/17/2020 0.7  0.3 - 6.2 % Final   • Basophil % 09/17/2020 1.0  0.0 - 1.5 % Final   • Immature Grans % 09/17/2020 0.2  0.0 - 0.5 % Final   • Neutrophils, Absolute 09/17/2020 6.39  1.70 - 7.00 10*3/mm3 Final   • Lymphocytes, Absolute 09/17/2020 1.72  0.70 - 3.10 10*3/mm3 Final   • Monocytes, Absolute 09/17/2020 0.85  0.10 - 0.90 10*3/mm3 Final   • Eosinophils, " Absolute 09/17/2020 0.06  0.00 - 0.40 10*3/mm3 Final   • Basophils, Absolute 09/17/2020 0.09  0.00 - 0.20 10*3/mm3 Final   • Immature Grans, Absolute 09/17/2020 0.02  0.00 - 0.05 10*3/mm3 Final   • nRBC 09/17/2020 0.0  0.0 - 0.2 /100 WBC Final       Imaging  No radiology results for the last 30 days.      Current Outpatient Medications:   •  loratadine (CLARITIN) 10 MG tablet, Take 10 mg by mouth Daily As Needed., Disp: , Rfl:   •  Myorisan 20 MG capsule, Take 1 tablet by mouth 1 (One) Time Per Week., Disp: , Rfl:   •  RHOFADE 1 % cream, Apply 1 application topically Every Morning., Disp: , Rfl: 2    Physical Exam:    Physical Exam  Vitals signs and nursing note reviewed.   Constitutional:       Appearance: Normal appearance. He is well-developed and normal weight.   HENT:      Head: Normocephalic and atraumatic.      Right Ear: Hearing, tympanic membrane, ear canal and external ear normal.      Left Ear: Hearing, tympanic membrane, ear canal and external ear normal.      Nose: Nose normal.      Mouth/Throat:      Pharynx: Uvula midline.   Eyes:      General: Lids are normal.      Conjunctiva/sclera: Conjunctivae normal.      Pupils: Pupils are equal, round, and reactive to light.   Neck:      Musculoskeletal: Full passive range of motion without pain, normal range of motion and neck supple.   Cardiovascular:      Rate and Rhythm: Normal rate and regular rhythm.      Heart sounds: Normal heart sounds.   Pulmonary:      Effort: Pulmonary effort is normal.      Breath sounds: Normal breath sounds.   Abdominal:      General: Bowel sounds are normal.      Palpations: Abdomen is soft.   Musculoskeletal: Normal range of motion.   Skin:     General: Skin is warm and dry.   Neurological:      Mental Status: He is alert and oriented to person, place, and time.      Deep Tendon Reflexes: Reflexes are normal and symmetric.   Psychiatric:         Speech: Speech normal.         Behavior: Behavior normal.         Thought Content:  "Thought content normal.         Judgment: Judgment normal.         Procedures        Assessment/Plan   Problem List Items Addressed This Visit        Respiratory    Sleep apnea (Chronic)    Overview     wears CPAP \"sometimes\"         Current Assessment & Plan     Discussed importance of weight loss.         Snores       Musculoskeletal and Integument    Rosacea (Chronic)    Overview     Continue myorisan 20mg capsules as directed.         Relevant Medications    RHOFADE 1 % cream    Myorisan 20 MG capsule       Other    Alcohol use (Chronic)    Overview     Reports 8-10 drinks a week.  No hx of w/d or seizures.  Discussed cutting back weekly.         Tobacco abuse    Overview     Reports smoking 2 packs/week.  Discussed cutting back to 1/PPweek.         Diverticulitis    Current Assessment & Plan     Continue high fiber diet.  Follow up with GI next year as scheduled.         Encounter for preventive health examination - Primary    Current Assessment & Plan     Tdap and Hep A today  Flu shot in October.  Fasting labs today.  Discussed smoking cessation.  Colonoscopy next year.         Relevant Orders    CBC & Differential (Completed)    Comprehensive Metabolic Panel    Lipid screening    Relevant Orders    Lipid Panel (Completed)    Screening PSA (prostate specific antigen)    Relevant Orders    PSA Screen        Patient Wellness Counseling:   Plan of care reviewed with patient at the conclusion of today's visit. Education was provided in regards to diagnosis, diet and exercise,  physical activity, healthy weight,ways to reduce stress, adequate sleep, injury prevention, misuse of tobacco, alcohol, dental health, mental health, and immunizations.    Management and any prescribed or recommended OTC medications.  Patient verbalizes understanding of and agreement with management plan.    Return in about 6 months (around 3/17/2021) for Recheck.    Rosemary Long PA-C    Please note that portions of this note were " completed with a voice recognition program. Efforts were made to edit the dictations, but occasionally words are mistranscribed.

## 2020-09-18 LAB
BASOPHILS # BLD AUTO: 0.09 10*3/MM3 (ref 0–0.2)
BASOPHILS NFR BLD AUTO: 1 % (ref 0–1.5)
CHOLEST SERPL-MCNC: 224 MG/DL (ref 0–200)
DEPRECATED RDW RBC AUTO: 42.5 FL (ref 37–54)
EOSINOPHIL # BLD AUTO: 0.06 10*3/MM3 (ref 0–0.4)
EOSINOPHIL NFR BLD AUTO: 0.7 % (ref 0.3–6.2)
ERYTHROCYTE [DISTWIDTH] IN BLOOD BY AUTOMATED COUNT: 12.3 % (ref 12.3–15.4)
HCT VFR BLD AUTO: 45.6 % (ref 37.5–51)
HDLC SERPL-MCNC: 86 MG/DL (ref 40–60)
HGB BLD-MCNC: 15.9 G/DL (ref 13–17.7)
IMM GRANULOCYTES # BLD AUTO: 0.02 10*3/MM3 (ref 0–0.05)
IMM GRANULOCYTES NFR BLD AUTO: 0.2 % (ref 0–0.5)
LDLC SERPL CALC-MCNC: 121 MG/DL (ref 0–100)
LDLC/HDLC SERPL: 1.41 {RATIO}
LYMPHOCYTES # BLD AUTO: 1.72 10*3/MM3 (ref 0.7–3.1)
LYMPHOCYTES NFR BLD AUTO: 18.8 % (ref 19.6–45.3)
MCH RBC QN AUTO: 32.6 PG (ref 26.6–33)
MCHC RBC AUTO-ENTMCNC: 34.9 G/DL (ref 31.5–35.7)
MCV RBC AUTO: 93.6 FL (ref 79–97)
MONOCYTES # BLD AUTO: 0.85 10*3/MM3 (ref 0.1–0.9)
MONOCYTES NFR BLD AUTO: 9.3 % (ref 5–12)
NEUTROPHILS NFR BLD AUTO: 6.39 10*3/MM3 (ref 1.7–7)
NEUTROPHILS NFR BLD AUTO: 70 % (ref 42.7–76)
NRBC BLD AUTO-RTO: 0 /100 WBC (ref 0–0.2)
PLATELET # BLD AUTO: 230 10*3/MM3 (ref 140–450)
PMV BLD AUTO: 10.7 FL (ref 6–12)
RBC # BLD AUTO: 4.87 10*6/MM3 (ref 4.14–5.8)
TRIGL SERPL-MCNC: 85 MG/DL (ref 0–150)
VLDLC SERPL-MCNC: 17 MG/DL (ref 5–40)
WBC # BLD AUTO: 9.13 10*3/MM3 (ref 3.4–10.8)

## 2020-09-20 PROBLEM — Z12.5 SCREENING PSA (PROSTATE SPECIFIC ANTIGEN): Status: ACTIVE | Noted: 2020-09-20

## 2020-09-20 PROBLEM — Z13.220 LIPID SCREENING: Status: ACTIVE | Noted: 2020-09-20

## 2020-09-20 PROBLEM — Z00.00 ENCOUNTER FOR PREVENTIVE HEALTH EXAMINATION: Status: ACTIVE | Noted: 2020-09-20

## 2020-09-20 NOTE — ASSESSMENT & PLAN NOTE
Tdap and Hep A today  Flu shot in October.  Fasting labs today.  Discussed smoking cessation.  Colonoscopy next year.

## 2020-09-25 ENCOUNTER — LAB (OUTPATIENT)
Dept: LAB | Facility: HOSPITAL | Age: 40
End: 2020-09-25

## 2020-09-25 DIAGNOSIS — Z00.00 ENCOUNTER FOR PREVENTIVE HEALTH EXAMINATION: ICD-10-CM

## 2020-09-25 DIAGNOSIS — Z12.5 SCREENING PSA (PROSTATE SPECIFIC ANTIGEN): ICD-10-CM

## 2020-09-25 LAB
ALBUMIN SERPL-MCNC: 4.6 G/DL (ref 3.5–5.2)
ALBUMIN/GLOB SERPL: 1.7 G/DL
ALP SERPL-CCNC: 61 U/L (ref 39–117)
ALT SERPL W P-5'-P-CCNC: 89 U/L (ref 1–41)
ANION GAP SERPL CALCULATED.3IONS-SCNC: 16.2 MMOL/L (ref 5–15)
AST SERPL-CCNC: 61 U/L (ref 1–40)
BILIRUB SERPL-MCNC: 0.8 MG/DL (ref 0–1.2)
BUN SERPL-MCNC: 11 MG/DL (ref 6–20)
BUN/CREAT SERPL: 10.3 (ref 7–25)
CALCIUM SPEC-SCNC: 9.3 MG/DL (ref 8.6–10.5)
CHLORIDE SERPL-SCNC: 100 MMOL/L (ref 98–107)
CO2 SERPL-SCNC: 19.8 MMOL/L (ref 22–29)
CREAT SERPL-MCNC: 1.07 MG/DL (ref 0.76–1.27)
GFR SERPL CREATININE-BSD FRML MDRD: 76 ML/MIN/1.73
GLOBULIN UR ELPH-MCNC: 2.7 GM/DL
GLUCOSE SERPL-MCNC: 97 MG/DL (ref 65–99)
POTASSIUM SERPL-SCNC: 4.5 MMOL/L (ref 3.5–5.2)
PROT SERPL-MCNC: 7.3 G/DL (ref 6–8.5)
PSA SERPL-MCNC: 0.67 NG/ML (ref 0–4)
SODIUM SERPL-SCNC: 136 MMOL/L (ref 136–145)

## 2020-09-25 PROCEDURE — 80053 COMPREHEN METABOLIC PANEL: CPT

## 2020-09-25 PROCEDURE — G0103 PSA SCREENING: HCPCS

## 2021-09-20 ENCOUNTER — OFFICE VISIT (OUTPATIENT)
Dept: INTERNAL MEDICINE | Facility: CLINIC | Age: 41
End: 2021-09-20

## 2021-09-20 ENCOUNTER — LAB (OUTPATIENT)
Dept: LAB | Facility: HOSPITAL | Age: 41
End: 2021-09-20

## 2021-09-20 VITALS
BODY MASS INDEX: 29.01 KG/M2 | TEMPERATURE: 97.7 F | DIASTOLIC BLOOD PRESSURE: 88 MMHG | SYSTOLIC BLOOD PRESSURE: 132 MMHG | WEIGHT: 214.2 LBS | HEIGHT: 72 IN | HEART RATE: 75 BPM

## 2021-09-20 DIAGNOSIS — Z20.822 CLOSE EXPOSURE TO COVID-19 VIRUS: ICD-10-CM

## 2021-09-20 DIAGNOSIS — Z00.00 ENCOUNTER FOR PREVENTIVE HEALTH EXAMINATION: ICD-10-CM

## 2021-09-20 DIAGNOSIS — Z00.00 ENCOUNTER FOR PREVENTIVE HEALTH EXAMINATION: Primary | ICD-10-CM

## 2021-09-20 DIAGNOSIS — Z13.29 THYROID DISORDER SCREENING: ICD-10-CM

## 2021-09-20 DIAGNOSIS — Z12.5 SCREENING PSA (PROSTATE SPECIFIC ANTIGEN): ICD-10-CM

## 2021-09-20 DIAGNOSIS — Z13.21 ENCOUNTER FOR VITAMIN DEFICIENCY SCREENING: ICD-10-CM

## 2021-09-20 DIAGNOSIS — Z78.9 ALCOHOL USE: Chronic | ICD-10-CM

## 2021-09-20 DIAGNOSIS — J30.9 ALLERGIC RHINITIS, UNSPECIFIED SEASONALITY, UNSPECIFIED TRIGGER: ICD-10-CM

## 2021-09-20 DIAGNOSIS — Z13.220 LIPID SCREENING: ICD-10-CM

## 2021-09-20 DIAGNOSIS — Z72.0 TOBACCO ABUSE: ICD-10-CM

## 2021-09-20 DIAGNOSIS — K57.92 DIVERTICULITIS: ICD-10-CM

## 2021-09-20 LAB
BASOPHILS # BLD AUTO: 0.07 10*3/MM3 (ref 0–0.2)
BASOPHILS NFR BLD AUTO: 1.3 % (ref 0–1.5)
DEPRECATED RDW RBC AUTO: 46.6 FL (ref 37–54)
EOSINOPHIL # BLD AUTO: 0.07 10*3/MM3 (ref 0–0.4)
EOSINOPHIL NFR BLD AUTO: 1.3 % (ref 0.3–6.2)
ERYTHROCYTE [DISTWIDTH] IN BLOOD BY AUTOMATED COUNT: 12.8 % (ref 12.3–15.4)
HCT VFR BLD AUTO: 47.1 % (ref 37.5–51)
HGB BLD-MCNC: 15.6 G/DL (ref 13–17.7)
IMM GRANULOCYTES # BLD AUTO: 0.01 10*3/MM3 (ref 0–0.05)
IMM GRANULOCYTES NFR BLD AUTO: 0.2 % (ref 0–0.5)
LYMPHOCYTES # BLD AUTO: 1.48 10*3/MM3 (ref 0.7–3.1)
LYMPHOCYTES NFR BLD AUTO: 26.4 % (ref 19.6–45.3)
MCH RBC QN AUTO: 32.2 PG (ref 26.6–33)
MCHC RBC AUTO-ENTMCNC: 33.1 G/DL (ref 31.5–35.7)
MCV RBC AUTO: 97.1 FL (ref 79–97)
MONOCYTES # BLD AUTO: 0.57 10*3/MM3 (ref 0.1–0.9)
MONOCYTES NFR BLD AUTO: 10.2 % (ref 5–12)
NEUTROPHILS NFR BLD AUTO: 3.4 10*3/MM3 (ref 1.7–7)
NEUTROPHILS NFR BLD AUTO: 60.6 % (ref 42.7–76)
NRBC BLD AUTO-RTO: 0 /100 WBC (ref 0–0.2)
PLATELET # BLD AUTO: 256 10*3/MM3 (ref 140–450)
PMV BLD AUTO: 11 FL (ref 6–12)
RBC # BLD AUTO: 4.85 10*6/MM3 (ref 4.14–5.8)
WBC # BLD AUTO: 5.6 10*3/MM3 (ref 3.4–10.8)

## 2021-09-20 PROCEDURE — 99396 PREV VISIT EST AGE 40-64: CPT | Performed by: PHYSICIAN ASSISTANT

## 2021-09-20 PROCEDURE — 82607 VITAMIN B-12: CPT

## 2021-09-20 PROCEDURE — 86769 SARS-COV-2 COVID-19 ANTIBODY: CPT

## 2021-09-20 PROCEDURE — 82306 VITAMIN D 25 HYDROXY: CPT

## 2021-09-20 PROCEDURE — 36415 COLL VENOUS BLD VENIPUNCTURE: CPT

## 2021-09-20 PROCEDURE — G0103 PSA SCREENING: HCPCS

## 2021-09-20 PROCEDURE — 80061 LIPID PANEL: CPT

## 2021-09-20 PROCEDURE — 84443 ASSAY THYROID STIM HORMONE: CPT

## 2021-09-20 PROCEDURE — 80053 COMPREHEN METABOLIC PANEL: CPT

## 2021-09-20 PROCEDURE — 85025 COMPLETE CBC W/AUTO DIFF WBC: CPT

## 2021-09-20 PROCEDURE — 84439 ASSAY OF FREE THYROXINE: CPT

## 2021-09-20 NOTE — PROGRESS NOTES
Patient Care Team:  Rosemary Long PA-C as PCP - General (Internal Medicine)  Mitchel Beck MD as Consulting Physician (Colon and Rectal Surgery)    Chief Complaint::   Chief Complaint   Patient presents with   • Annual Exam     physical         Subjective     HPI  Arie is a 41-year-old male with history of diverticulitis with colon resection, rosacea, and seasonal allergies, who presents for annual physical.   He is fasting.   Colonoscopy due this year.  He continues to smoke occasionally half a pack per day.  Drinks alcohol 4-5 times a week.  Heavier on the weekends.  He has sleep apnea but does not wear CPAP.  He has a hx of allergies and takes claritin 10mg daily.  He denies any chest pain, SOB, heartburn/indigestion.   Since his surgery BM will alternate with constipation and diarrhea.   He denies any abdominal discomfort.  Currently under the care of dermatology for myorisan for rosacea.  Does exercise, reports going to the gym twice a week.  Notices varicose veins on left calf.  He denies leg pain or edema.  He has had J&J vaccine.      The following portions of the patient's history were reviewed and updated as appropriate: active problem list, medication list, allergies, family history, social history    Review of Systems:   Review of Systems   Constitutional: Negative for activity change, appetite change, diaphoresis, fatigue, unexpected weight gain and unexpected weight loss.   HENT: Negative for hearing loss.    Eyes: Negative for visual disturbance.   Respiratory: Negative for chest tightness and shortness of breath.    Cardiovascular: Negative for chest pain, palpitations and leg swelling.   Gastrointestinal: Negative for abdominal pain, blood in stool, GERD and indigestion.   Endocrine: Negative for cold intolerance and heat intolerance.   Genitourinary: Negative for dysuria and hematuria.   Musculoskeletal: Negative for arthralgias and myalgias.   Skin: Negative for skin lesions.  "  Allergic/Immunologic: Positive for environmental allergies.   Neurological: Negative for tremors, seizures, syncope, speech difficulty, weakness, headache, memory problem and confusion.   Hematological: Does not bruise/bleed easily.   Psychiatric/Behavioral: Negative for sleep disturbance and depressed mood. The patient is not nervous/anxious.        Vital Signs  Vitals:    09/20/21 0957   BP: 132/88   BP Location: Left arm   Patient Position: Sitting   Cuff Size: Adult   Pulse: 75   Temp: 97.7 °F (36.5 °C)   TempSrc: Temporal   Weight: 97.2 kg (214 lb 3.2 oz)   Height: 182.9 cm (72.01\")   PainSc: 0-No pain     Body mass index is 29.04 kg/m².    Labs  No visits with results within 3 Month(s) from this visit.   Latest known visit with results is:   Lab on 09/25/2020   Component Date Value Ref Range Status   • Glucose 09/25/2020 97  65 - 99 mg/dL Final   • BUN 09/25/2020 11  6 - 20 mg/dL Final   • Creatinine 09/25/2020 1.07  0.76 - 1.27 mg/dL Final   • Sodium 09/25/2020 136  136 - 145 mmol/L Final   • Potassium 09/25/2020 4.5  3.5 - 5.2 mmol/L Final   • Chloride 09/25/2020 100  98 - 107 mmol/L Final   • CO2 09/25/2020 19.8* 22.0 - 29.0 mmol/L Final   • Calcium 09/25/2020 9.3  8.6 - 10.5 mg/dL Final   • Total Protein 09/25/2020 7.3  6.0 - 8.5 g/dL Final   • Albumin 09/25/2020 4.60  3.50 - 5.20 g/dL Final   • ALT (SGPT) 09/25/2020 89* 1 - 41 U/L Final   • AST (SGOT) 09/25/2020 61* 1 - 40 U/L Final   • Alkaline Phosphatase 09/25/2020 61  39 - 117 U/L Final   • Total Bilirubin 09/25/2020 0.8  0.0 - 1.2 mg/dL Final   • eGFR Non African Amer 09/25/2020 76  >60 mL/min/1.73 Final   • Globulin 09/25/2020 2.7  gm/dL Final   • A/G Ratio 09/25/2020 1.7  g/dL Final   • BUN/Creatinine Ratio 09/25/2020 10.3  7.0 - 25.0 Final   • Anion Gap 09/25/2020 16.2* 5.0 - 15.0 mmol/L Final   • PSA 09/25/2020 0.668  0.000 - 4.000 ng/mL Final       Imaging  No radiology results for the last 30 days.      Current Outpatient Medications:   •  " loratadine (CLARITIN) 10 MG tablet, Take 10 mg by mouth Daily As Needed., Disp: , Rfl:   •  Myorisan 20 MG capsule, Take 1 tablet by mouth 1 (One) Time Per Week., Disp: , Rfl:   •  RHOFADE 1 % cream, Apply 1 application topically Every Morning., Disp: , Rfl: 2  •  sulfacetamide sodium-sulfur (AVAR,PLEXION) 10-5 % topical emulsion, , Disp: , Rfl:     Physical Exam:    Physical Exam  Vitals reviewed.   Constitutional:       Appearance: Normal appearance. He is well-developed.   HENT:      Head: Normocephalic and atraumatic.      Right Ear: Hearing, tympanic membrane, ear canal and external ear normal.      Left Ear: Hearing, tympanic membrane, ear canal and external ear normal.      Nose: Nose normal.      Mouth/Throat:      Pharynx: Uvula midline.   Eyes:      General: Lids are normal.      Conjunctiva/sclera: Conjunctivae normal.      Pupils: Pupils are equal, round, and reactive to light.   Cardiovascular:      Rate and Rhythm: Normal rate and regular rhythm.      Heart sounds: Normal heart sounds.   Pulmonary:      Effort: Pulmonary effort is normal.      Breath sounds: Normal breath sounds.   Abdominal:      General: Bowel sounds are normal.      Palpations: Abdomen is soft.      Tenderness: There is no right CVA tenderness or left CVA tenderness.   Musculoskeletal:         General: Normal range of motion.      Cervical back: Full passive range of motion without pain, normal range of motion and neck supple.   Skin:     General: Skin is warm and dry.   Neurological:      Mental Status: He is alert and oriented to person, place, and time.      Deep Tendon Reflexes: Reflexes are normal and symmetric.   Psychiatric:         Speech: Speech normal.         Behavior: Behavior normal.         Thought Content: Thought content normal.         Judgment: Judgment normal.         Procedures        Assessment/Plan   Problem List Items Addressed This Visit        Allergies and Adverse Reactions    Allergic rhinitis    Overview      Chronic allergic rhinitis, unspecified seasonality, unspecified trigger.  Continue loratadine 10 mg tablets daily.            Cardiac and Vasculature    Lipid screening    Overview     Try and eat a low-fat, low-cholesterol diet.         Relevant Orders    Comprehensive Metabolic Panel    Lipid Panel       Gastrointestinal Abdominal     Diverticulitis    Overview     History of abscess due to diverticulitis.  Has colonoscopy with Dr. Beck scheduled.            Genitourinary and Reproductive     Screening PSA (prostate specific antigen)    Relevant Orders    PSA Screen       Health Encounters    Encounter for preventive health examination - Primary    Overview     Arie is 41-year-old male who presents for routine physical.  He does have a scheduled colonoscopy.  Has had J&J vaccine.  Due for fasting labs today.  Declines flu shot for now, will have this completed in October.  Continues to smoke, down to half a pack per day.  Moderate alcohol consumption.         Relevant Orders    CBC & Differential    Comprehensive Metabolic Panel       Mental Health    Alcohol use (Chronic)    Overview     Reports 8-10 drinks a week.  No hx of w/d or seizures.  Discussed cutting back weekly.            Tobacco    Tobacco abuse    Overview     Reports smoking half a pack per day.           Other Visit Diagnoses     Thyroid disorder screening        Relevant Orders    TSH    T4, Free    Encounter for vitamin deficiency screening        Relevant Orders    Vitamin B12    Vitamin D 25 Hydroxy    Close exposure to COVID-19 virus        Relevant Orders    SARS-CoV-2 Antibodies (Roche)      Patient Wellness Counseling:   Plan of care reviewed with patient at the conclusion of today's visit. Education was provided in regards to diagnosis, diet and exercise, nutrition, physical activity, healthy weight,ways to reduce stress, adequate sleep, injury prevention, misuse of tobacco, alcohol and drugs,  dental health, mental health, and  immunizations.    Management and any prescribed or recommended OTC medications.  Patient verbalizes understanding of and agreement with management plan.      Return in about 1 year (around 9/20/2022) for ROUTINE PHYSICAL.    Plan of care reviewed with patient at the conclusion of today's visit. Education was provided regarding diagnosis, management, and any prescribed or recommended OTC medications.Patient verbalizes understanding of and agreement with management plan.     I spent 25 minutes caring for Arie on this date of service. This time includes time spent by me in the following activities:preparing for the visit, reviewing tests, obtaining and/or reviewing a separately obtained history, performing a medically appropriate examination and/or evaluation , counseling and educating the patient/family/caregiver, ordering medications, tests, or procedures and documenting information in the medical record    Rosemary Long PA-C    Please note that portions of this note were completed with a voice recognition program. Efforts were made to edit the dictations, but occasionally words are mistranscribed.

## 2021-09-20 NOTE — PATIENT INSTRUCTIONS
"BMI for Adults  What is BMI?  Body mass index (BMI) is a number that is calculated from a person's weight and height. BMI can help estimate how much of a person's weight is composed of fat. BMI does not measure body fat directly. Rather, it is an alternative to procedures that directly measure body fat, which can be difficult and expensive.  BMI can help identify people who may be at higher risk for certain medical problems.  What are BMI measurements used for?  BMI is used as a screening tool to identify possible weight problems. It helps determine whether a person is obese, overweight, a healthy weight, or underweight.  BMI is useful for:  · Identifying a weight problem that may be related to a medical condition or may increase the risk for medical problems.  · Promoting changes, such as changes in diet and exercise, to help reach a healthy weight. BMI screening can be repeated to see if these changes are working.  How is BMI calculated?  BMI involves measuring your weight in relation to your height. Both height and weight are measured, and the BMI is calculated from those numbers. This can be done either in English ([place] or metric measurements. Note that charts and online BMI calculators are available to help you find your BMI quickly and easily without having to do these calculations yourself.  To calculate your BMI in English ([place] measurements:    1. Measure your weight in pounds (lb).  2. Multiply the number of pounds by 703.  ? For example, for a person who weighs 180 lb, multiply that number by 703, which equals 126,540.  3. Measure your height in inches. Then multiply that number by itself to get a measurement called \"inches squared.\"  ? For example, for a person who is 70 inches tall, the \"inches squared\" measurement is 70 inches x 70 inches, which equals 4,900 inches squared.  4. Divide the total from step 2 (number of lb x 703) by the total from step 3 (inches squared): 126,540 ÷ 4,900 = 25.8. This is " "your BMI.    To calculate your BMI in metric measurements:  1. Measure your weight in kilograms (kg).  2. Measure your height in meters (m). Then multiply that number by itself to get a measurement called \"meters squared.\"  ? For example, for a person who is 1.75 m tall, the \"meters squared\" measurement is 1.75 m x 1.75 m, which is equal to 3.1 meters squared.  3. Divide the number of kilograms (your weight) by the meters squared number. In this example: 70 ÷ 3.1 = 22.6. This is your BMI.  What do the results mean?  BMI charts are used to identify whether you are underweight, normal weight, overweight, or obese. The following guidelines will be used:  · Underweight: BMI less than 18.5.  · Normal weight: BMI between 18.5 and 24.9.  · Overweight: BMI between 25 and 29.9.  · Obese: BMI of 30 or above.  Keep these notes in mind:  · Weight includes both fat and muscle, so someone with a muscular build, such as an athlete, may have a BMI that is higher than 24.9. In cases like these, BMI is not an accurate measure of body fat.  · To determine if excess body fat is the cause of a BMI of 25 or higher, further assessments may need to be done by a health care provider.  · BMI is usually interpreted in the same way for men and women.  Where to find more information  For more information about BMI, including tools to quickly calculate your BMI, go to these websites:  · Centers for Disease Control and Prevention: www.cdc.gov  · American Heart Association: www.heart.org  · National Heart, Lung, and [employer] [place]: www.nhlbi.nih.gov  Summary  · Body mass index (BMI) is a number that is calculated from a person's weight and height.  · BMI may help estimate how much of a person's weight is composed of fat. BMI can help identify those who may be at higher risk for certain medical problems.  · BMI can be measured using English measurements or metric measurements.  · BMI charts are used to identify whether you are underweight, normal " weight, overweight, or obese.  This information is not intended to replace advice given to you by your health care provider. Make sure you discuss any questions you have with your health care provider.  Document Revised: 09/09/2020 Document Reviewed: 07/17/2020  Elsevier Patient Education © 2021 [employer]      Smoking Tobacco Information, Adult  Smoking tobacco can be harmful to your health. Tobacco contains a poisonous (toxic), colorless chemical called nicotine. Nicotine is addictive. It changes the brain and can make it hard to stop smoking. Tobacco also has other toxic chemicals that can hurt your body and raise your risk of many cancers.  How can smoking tobacco affect me?  Smoking tobacco puts you at risk for:  · Cancer. Smoking is most commonly associated with lung cancer, but can also lead to cancer in other parts of the body.  · Chronic obstructive pulmonary disease (COPD). This is a long-term lung condition that makes it hard to breathe. It also gets worse over time.  · High blood pressure (hypertension), heart disease, stroke, or heart attack.  · Lung infections, such as pneumonia.  · Cataracts. This is when the lenses in the eyes become clouded.  · Digestive problems. This may include peptic ulcers, heartburn, and gastroesophageal reflux disease (GERD).  · Oral health problems, such as gum disease and tooth loss.  · Loss of taste and smell.  Smoking can affect your appearance by causing:  · Wrinkles.  · Yellow or stained teeth, fingers, and fingernails.  Smoking tobacco can also affect your social life, because:  · It may be challenging to find places to smoke when away from home. Many workplaces, restaurants, hotels, and public places are tobacco-free.  · Smoking is expensive. This is due to the cost of tobacco and the long-term costs of treating health problems from smoking.  · Secondhand smoke may affect those around you. Secondhand smoke can cause lung cancer, breathing problems, and heart  disease. Children of smokers have a higher risk for:  ? Sudden infant death syndrome (SIDS).  ? Ear infections.  ? Lung infections.  If you currently smoke tobacco, quitting now can help you:  · Lead a longer and healthier life.  · Look, smell, breathe, and feel better over time.  · Save money.  · Protect others from the harms of secondhand smoke.  What actions can I take to prevent health problems?  Quit smoking    · Do not start smoking. Quit if you already do.  · Make a plan to quit smoking and commit to it. Look for programs to help you and ask your health care provider for recommendations and ideas.  · Set a date and write down all the reasons you want to quit.  · Let your friends and family know you are quitting so they can help and support you. Consider finding friends who also want to quit. It can be easier to quit with someone else, so that you can support each other.  · Talk with your health care provider about using nicotine replacement medicines to help you quit, such as gum, lozenges, patches, sprays, or pills.  · Do not replace cigarette smoking with electronic cigarettes, which are commonly called e-cigarettes. The safety of e-cigarettes is not known, and some may contain harmful chemicals.  · If you try to quit but return to smoking, stay positive. It is common to slip up when you first quit, so take it one day at a time.  · Be prepared for cravings. When you feel the urge to smoke, chew gum or suck on hard candy.    Lifestyle  · Stay busy and take care of your body.  · Drink enough fluid to keep your urine pale yellow.  · Get plenty of exercise and eat a healthy diet. This can help prevent weight gain after quitting.  · Monitor your eating habits. Quitting smoking can cause you to have a larger appetite than when you smoke.  · Find ways to relax. Go out with friends or family to a movie or a restaurant where people do not smoke.  · Ask your health care provider about having regular tests  (screenings) to check for cancer. This may include blood tests, imaging tests, and other tests.  · Find ways to manage your stress, such as meditation, yoga, or exercise.  Where to find support  To get support to quit smoking, consider:  · Asking your health care provider for more information and resources.  · Taking classes to learn more about quitting smoking.  · Looking for local organizations that offer resources about quitting smoking.  · Joining a support group for people who want to quit smoking in your local community.  · Calling the smokefree.gov counselor helpline: 3-800-Quit-Now (1-643.264.3666)  Where to find more information  You may find more information about quitting smoking from:  · HelpGuide.org: www.helpguide.org  · Smokefree.gov: smokefree.gov  · American Lung Association: www.lung.org  Contact a health care provider if you:  · Have problems breathing.  · Notice that your lips, nose, or fingers turn blue.  · Have chest pain.  · Are coughing up blood.  · Feel faint or you pass out.  · Have other health changes that cause you to worry.  Summary  · Smoking tobacco can negatively affect your health, the health of those around you, your finances, and your social life.  · Do not start smoking. Quit if you already do. If you need help quitting, ask your health care provider.  · Think about joining a support group for people who want to quit smoking in your local community. There are many effective programs that will help you to quit this behavior.  This information is not intended to replace advice given to you by your health care provider. Make sure you discuss any questions you have with your health care provider.  Document Revised: 09/11/2020 Document Reviewed: 01/02/2018  Elsevier Patient Education © 2021 [employer]

## 2021-09-21 LAB
25(OH)D3 SERPL-MCNC: 29.2 NG/ML
ALBUMIN SERPL-MCNC: 4.7 G/DL (ref 3.5–5.2)
ALBUMIN/GLOB SERPL: 1.5 G/DL
ALP SERPL-CCNC: 65 U/L (ref 39–117)
ALT SERPL W P-5'-P-CCNC: 48 U/L (ref 1–41)
ANION GAP SERPL CALCULATED.3IONS-SCNC: 12.3 MMOL/L (ref 5–15)
AST SERPL-CCNC: 27 U/L (ref 1–40)
BILIRUB SERPL-MCNC: 0.9 MG/DL (ref 0–1.2)
BUN SERPL-MCNC: 9 MG/DL (ref 6–20)
BUN/CREAT SERPL: 9.8 (ref 7–25)
CALCIUM SPEC-SCNC: 9.7 MG/DL (ref 8.6–10.5)
CHLORIDE SERPL-SCNC: 101 MMOL/L (ref 98–107)
CHOLEST SERPL-MCNC: 212 MG/DL (ref 0–200)
CO2 SERPL-SCNC: 27.7 MMOL/L (ref 22–29)
CREAT SERPL-MCNC: 0.92 MG/DL (ref 0.76–1.27)
GFR SERPL CREATININE-BSD FRML MDRD: 90 ML/MIN/1.73
GLOBULIN UR ELPH-MCNC: 3.1 GM/DL
GLUCOSE SERPL-MCNC: 82 MG/DL (ref 65–99)
HDLC SERPL-MCNC: 98 MG/DL (ref 40–60)
LDLC SERPL CALC-MCNC: 102 MG/DL (ref 0–100)
LDLC/HDLC SERPL: 1.03 {RATIO}
POTASSIUM SERPL-SCNC: 4.4 MMOL/L (ref 3.5–5.2)
PROT SERPL-MCNC: 7.8 G/DL (ref 6–8.5)
PSA SERPL-MCNC: 0.89 NG/ML (ref 0–4)
SARS-COV-2 AB SERPL QL IA: NEGATIVE
SODIUM SERPL-SCNC: 141 MMOL/L (ref 136–145)
T4 FREE SERPL-MCNC: 1.38 NG/DL (ref 0.93–1.7)
TRIGL SERPL-MCNC: 65 MG/DL (ref 0–150)
TSH SERPL DL<=0.05 MIU/L-ACNC: 4.48 UIU/ML (ref 0.27–4.2)
VIT B12 BLD-MCNC: 488 PG/ML (ref 211–946)
VLDLC SERPL-MCNC: 12 MG/DL (ref 5–40)

## 2022-10-04 ENCOUNTER — OFFICE VISIT (OUTPATIENT)
Dept: INTERNAL MEDICINE | Facility: CLINIC | Age: 42
End: 2022-10-04

## 2022-10-04 ENCOUNTER — LAB (OUTPATIENT)
Dept: LAB | Facility: HOSPITAL | Age: 42
End: 2022-10-04

## 2022-10-04 DIAGNOSIS — Z13.220 LIPID SCREENING: ICD-10-CM

## 2022-10-04 DIAGNOSIS — E55.9 VITAMIN D DEFICIENCY: ICD-10-CM

## 2022-10-04 DIAGNOSIS — K57.92 DIVERTICULITIS: ICD-10-CM

## 2022-10-04 DIAGNOSIS — J30.9 ALLERGIC RHINITIS, UNSPECIFIED SEASONALITY, UNSPECIFIED TRIGGER: ICD-10-CM

## 2022-10-04 DIAGNOSIS — Z72.0 TOBACCO ABUSE: ICD-10-CM

## 2022-10-04 DIAGNOSIS — Z12.5 SCREENING PSA (PROSTATE SPECIFIC ANTIGEN): ICD-10-CM

## 2022-10-04 DIAGNOSIS — Z13.29 THYROID DISORDER SCREENING: ICD-10-CM

## 2022-10-04 DIAGNOSIS — G47.33 OBSTRUCTIVE SLEEP APNEA SYNDROME: Chronic | ICD-10-CM

## 2022-10-04 DIAGNOSIS — Z00.00 ENCOUNTER FOR PREVENTIVE HEALTH EXAMINATION: Primary | ICD-10-CM

## 2022-10-04 DIAGNOSIS — L71.9 ROSACEA: Chronic | ICD-10-CM

## 2022-10-04 LAB
25(OH)D3 SERPL-MCNC: 26.5 NG/ML (ref 30–100)
ALBUMIN SERPL-MCNC: 4.6 G/DL (ref 3.5–5.2)
ALBUMIN/GLOB SERPL: 1.8 G/DL
ALP SERPL-CCNC: 73 U/L (ref 39–117)
ALT SERPL W P-5'-P-CCNC: 49 U/L (ref 1–41)
ANION GAP SERPL CALCULATED.3IONS-SCNC: 10.4 MMOL/L (ref 5–15)
AST SERPL-CCNC: 36 U/L (ref 1–40)
BASOPHILS # BLD AUTO: 0.07 10*3/MM3 (ref 0–0.2)
BASOPHILS NFR BLD AUTO: 1.4 % (ref 0–1.5)
BILIRUB SERPL-MCNC: 1 MG/DL (ref 0–1.2)
BUN SERPL-MCNC: 10 MG/DL (ref 6–20)
BUN/CREAT SERPL: 10.9 (ref 7–25)
CALCIUM SPEC-SCNC: 9.6 MG/DL (ref 8.6–10.5)
CHLORIDE SERPL-SCNC: 103 MMOL/L (ref 98–107)
CHOLEST SERPL-MCNC: 216 MG/DL (ref 0–200)
CO2 SERPL-SCNC: 27.6 MMOL/L (ref 22–29)
CREAT SERPL-MCNC: 0.92 MG/DL (ref 0.76–1.27)
DEPRECATED RDW RBC AUTO: 44.5 FL (ref 37–54)
EGFRCR SERPLBLD CKD-EPI 2021: 105.8 ML/MIN/1.73
EOSINOPHIL # BLD AUTO: 0.1 10*3/MM3 (ref 0–0.4)
EOSINOPHIL NFR BLD AUTO: 1.9 % (ref 0.3–6.2)
ERYTHROCYTE [DISTWIDTH] IN BLOOD BY AUTOMATED COUNT: 12.8 % (ref 12.3–15.4)
GLOBULIN UR ELPH-MCNC: 2.6 GM/DL
GLUCOSE SERPL-MCNC: 88 MG/DL (ref 65–99)
HCT VFR BLD AUTO: 47.2 % (ref 37.5–51)
HDLC SERPL-MCNC: 108 MG/DL (ref 40–60)
HGB BLD-MCNC: 16.1 G/DL (ref 13–17.7)
IMM GRANULOCYTES # BLD AUTO: 0.01 10*3/MM3 (ref 0–0.05)
IMM GRANULOCYTES NFR BLD AUTO: 0.2 % (ref 0–0.5)
LDLC SERPL CALC-MCNC: 95 MG/DL (ref 0–100)
LDLC/HDLC SERPL: 0.86 {RATIO}
LYMPHOCYTES # BLD AUTO: 1.57 10*3/MM3 (ref 0.7–3.1)
LYMPHOCYTES NFR BLD AUTO: 30.6 % (ref 19.6–45.3)
MCH RBC QN AUTO: 32.5 PG (ref 26.6–33)
MCHC RBC AUTO-ENTMCNC: 34.1 G/DL (ref 31.5–35.7)
MCV RBC AUTO: 95.4 FL (ref 79–97)
MONOCYTES # BLD AUTO: 0.74 10*3/MM3 (ref 0.1–0.9)
MONOCYTES NFR BLD AUTO: 14.4 % (ref 5–12)
NEUTROPHILS NFR BLD AUTO: 2.64 10*3/MM3 (ref 1.7–7)
NEUTROPHILS NFR BLD AUTO: 51.5 % (ref 42.7–76)
NRBC BLD AUTO-RTO: 0 /100 WBC (ref 0–0.2)
PLATELET # BLD AUTO: 218 10*3/MM3 (ref 140–450)
PMV BLD AUTO: 10.7 FL (ref 6–12)
POTASSIUM SERPL-SCNC: 4.7 MMOL/L (ref 3.5–5.2)
PROT SERPL-MCNC: 7.2 G/DL (ref 6–8.5)
PSA SERPL-MCNC: 0.69 NG/ML (ref 0–4)
RBC # BLD AUTO: 4.95 10*6/MM3 (ref 4.14–5.8)
SODIUM SERPL-SCNC: 141 MMOL/L (ref 136–145)
T3FREE SERPL-MCNC: 4.37 PG/ML (ref 2–4.4)
T4 FREE SERPL-MCNC: 1.41 NG/DL (ref 0.93–1.7)
TRIGL SERPL-MCNC: 77 MG/DL (ref 0–150)
TSH SERPL DL<=0.05 MIU/L-ACNC: 5.13 UIU/ML (ref 0.27–4.2)
VLDLC SERPL-MCNC: 13 MG/DL (ref 5–40)
WBC NRBC COR # BLD: 5.13 10*3/MM3 (ref 3.4–10.8)

## 2022-10-04 PROCEDURE — G0103 PSA SCREENING: HCPCS

## 2022-10-04 PROCEDURE — 80050 GENERAL HEALTH PANEL: CPT

## 2022-10-04 PROCEDURE — 80061 LIPID PANEL: CPT

## 2022-10-04 PROCEDURE — 84481 FREE ASSAY (FT-3): CPT

## 2022-10-04 PROCEDURE — 82306 VITAMIN D 25 HYDROXY: CPT

## 2022-10-04 PROCEDURE — 84439 ASSAY OF FREE THYROXINE: CPT

## 2022-10-04 PROCEDURE — 99396 PREV VISIT EST AGE 40-64: CPT | Performed by: PHYSICIAN ASSISTANT

## 2022-10-04 RX ORDER — SARECYCLINE HYDROCHLORIDE 150 MG/1
TABLET, COATED ORAL
COMMUNITY
Start: 2022-08-29

## 2022-10-04 NOTE — ASSESSMENT & PLAN NOTE
The patient will receive laboratory tests with the inclusion of a prostate stimulating hormone test, a comprehensive metabolic panel, a complete blood count, vitamin D level test, and a lipid profile. We will check the patient's blood counts to see if he has an infection.

## 2022-10-04 NOTE — PROGRESS NOTES
[facility] Internal Medicine    Arie Hayden  1980   [id number]      Patient Care Team:  Rosemary Long PA-C as PCP - General (Internal Medicine)  Mitchel Beck MD as Consulting Physician (Colon and Rectal Surgery)    Chief Complaint::   Chief Complaint   Patient presents with   • Annual Exam        HPI  Patient verbalized consent to the visit recording.    Arie Hayden is a 42-year-old male who presents today for routine physical. Past medical history significant for diverticulitis, alcohol use, sleep apnea, and tobacco abuse.    Colonoscopy and digestive system health  The patient confirms had a colonoscopy performed in 2021, which revealed a polyp. He reports he was advised to return for another colonoscopy 2 years. The patient denies taking a fiber supplement. The patient states that he does not drink coffee in the morning, although he does drink a Diet Coke every day at lunch. The patient reports that the first time he had a diverticulitis flare up he had no paint, but he notes that he did have an abscess.    Vaccines  The patient confirms that he has received the 2 doses of the Gallo and Gallo COVID-19 vaccine. The patient states he contracted COVID-19, in 08/2022, and continues to feel fatigued.    Allergies  The patient confirms that he continues to take Claritin as needed for his allergies, and notes that he his allergies have been less severe this year.    Rosacea  The patient confirms that he is currently prescribed Saysara to treat rosacea. The patient reports that he is being treated by Dr. Arteaga for his rosacea.    Sleep and sleep apnea  The patient confirms he has had a sleep study performed in the past. The patient confirms that he has a continuous positive airway pressure machine and does not like using it. The patient states that he has been sleeping well recently. However, he notes that, for the past 2 days, he has been experiencing severe night sweats. He denies any  "fever.    Weight gain  The patient reports he had previously been exercising 2 times a week beginning in 01/2022. The patient reports that due to having a back injury, on 07/04/2022, he has not been working out often recently.     Hypertension  The patient denies checking his blood pressure at home and denies any family history of hypertension.    Health maintenance   The patient reports that he still smokes 1 half to a full pack of cigarettes every day. The patient confirms that he still drinks alcohol and confirms that he drank alcohol last night. The patient confirms he is fasting today.         Patient Active Problem List   Diagnosis   • Left lower quadrant pain   • Sepsis    • Tobacco abuse   • Abscess of sigmoid colon due to diverticulitis   • Allergic rhinitis   • Fatigue   • Snores   • Diverticulitis   • Sleep apnea   • Rosacea   • Alcohol use   • Encounter for preventive health examination   • Lipid screening   • Screening PSA (prostate specific antigen)        Past Medical History:   Diagnosis Date   • Allergic rhinitis    • Diverticulitis 01/15/2019   • Diverticulosis    • Environmental allergies    • Fatigue    • Rosacea    • Sleep apnea     wears CPAP \"sometimes\"   • Snores        Past Surgical History:   Procedure Laterality Date   • COLON RESECTION N/A 5/6/2019    Procedure: LOWER ANTERIOR RESECTION, APPENDECTOMY, TAKEDOWN OF SPLENIC FLEXURE, EXCISION AND CLOSURE OF COLOCECAL FISTULA, UMBILICAL HERNIA REPAIR, AND PROCTOSCOPY;  Surgeon: Mitchel Beck MD;  Location: [facility];  Service: General   • COLONOSCOPY     • CT ABSCESS DRAIN CYST ASP     • TONSILLECTOMY  1987   • VASECTOMY  2011   • WISDOM TOOTH EXTRACTION         Family History   Problem Relation Age of Onset   • Factor V Leiden deficiency Father    • Suicidality Father    • Factor IX deficiency Father    • Breast cancer Maternal Grandmother    • Lung cancer Maternal Grandfather    • Factor V Leiden deficiency Paternal Grandfather    • " Prostate cancer Paternal Grandfather    • Hyperlipidemia Mother    • Hypothyroidism Mother    • No Known Problems Sister    • No Known Problems Brother    • No Known Problems Daughter    • No Known Problems Daughter        Social History     Socioeconomic History   • Marital status:    Tobacco Use   • Smoking status: Light Tobacco Smoker     Packs/day: 0.25     Years: 10.00     Pack years: 2.50     Types: Cigarettes     Start date: 1/8/2004   • Smokeless tobacco: Former User   • Tobacco comment: occasionally    Substance and Sexual Activity   • Alcohol use: Yes     Comment: 8-10/week   • Drug use: No   • Sexual activity: Defer       Allergies   Allergen Reactions   • Pollen Extract Other (See Comments)     Sinus related       Review of Systems   Constitutional: Positive for unexpected weight gain. Negative for activity change, appetite change, diaphoresis, fatigue and unexpected weight loss.   HENT: Negative for hearing loss.    Eyes: Negative for visual disturbance.   Respiratory: Negative for chest tightness and shortness of breath.    Cardiovascular: Negative for chest pain, palpitations and leg swelling.   Gastrointestinal: Positive for abdominal pain. Negative for blood in stool, GERD and indigestion.   Endocrine: Negative for cold intolerance and heat intolerance.   Genitourinary: Negative for dysuria and hematuria.   Musculoskeletal: Negative for arthralgias and myalgias.   Skin: Negative for skin lesions.   Neurological: Negative for tremors, seizures, syncope, speech difficulty, weakness, headache, memory problem and confusion.   Hematological: Does not bruise/bleed easily.   Psychiatric/Behavioral: Negative for sleep disturbance and depressed mood. The patient is not nervous/anxious.           Vital Signs  Vitals:    10/04/22 0929 10/06/22 1050   BP: 132/94 130/88   BP Location: Left arm    Patient Position: Sitting    Cuff Size: Large Adult    Pulse: 86    Temp: 97.8 °F (36.6 °C)    TempSrc:  "Temporal    Weight: 101 kg (223 lb)    Height: 182.9 cm (72.01\")    PainSc: 0-No pain      Body mass index is 30.24 kg/m².  BMI is >= 30 and <35. (Class 1 Obesity). The following options were offered after discussion;: weight loss educational material (shared in after visit summary), exercise counseling/recommendations and nutrition counseling/recommendations     Advance Care Planning   ACP discussion was held with the patient during this visit. Patient does not have an advance directive, information provided.       Current Outpatient Medications:   •  loratadine (CLARITIN) 10 MG tablet, Take 10 mg by mouth Daily As Needed., Disp: , Rfl:   •  Myorisan 20 MG capsule, Take 1 tablet by mouth 1 (One) Time Per Week., Disp: , Rfl:   •  RHOFADE 1 % cream, Apply 1 application topically Every Morning., Disp: , Rfl: 2  •  sulfacetamide sodium-sulfur (AVAR,PLEXION) 10-5 % topical emulsion, , Disp: , Rfl:   •  Seysara 150 MG tablet, , Disp: , Rfl:     Physical Exam  Vitals reviewed.   Constitutional:       Appearance: Normal appearance. He is well-developed.   HENT:      Head: Normocephalic and atraumatic.      Right Ear: Hearing, tympanic membrane, ear canal and external ear normal.      Left Ear: Hearing, tympanic membrane, ear canal and external ear normal.      Nose: Nose normal.      Mouth/Throat:      Pharynx: Uvula midline.   Eyes:      General: Lids are normal.      Conjunctiva/sclera: Conjunctivae normal.      Pupils: Pupils are equal, round, and reactive to light.   Cardiovascular:      Rate and Rhythm: Normal rate and regular rhythm.      Heart sounds: Normal heart sounds.   Pulmonary:      Effort: Pulmonary effort is normal.      Breath sounds: Normal breath sounds.   Abdominal:      General: Bowel sounds are normal.      Palpations: Abdomen is soft.      Tenderness: There is abdominal tenderness in the left lower quadrant. There is no right CVA tenderness or left CVA tenderness.   Musculoskeletal:         General: " Normal range of motion.      Cervical back: Full passive range of motion without pain, normal range of motion and neck supple.   Skin:     General: Skin is warm and dry.   Neurological:      Mental Status: He is alert and oriented to person, place, and time.      Deep Tendon Reflexes: Reflexes are normal and symmetric.   Psychiatric:         Speech: Speech normal.         Behavior: Behavior normal.         Thought Content: Thought content normal.         Judgment: Judgment normal.          ACE III MINI            Results Review:    Recent Results (from the past 672 hour(s))   Comprehensive Metabolic Panel    Collection Time: 10/04/22 10:28 AM    Specimen: Blood   Result Value Ref Range    Glucose 88 65 - 99 mg/dL    BUN 10 6 - 20 mg/dL    Creatinine 0.92 0.76 - 1.27 mg/dL    Sodium 141 136 - 145 mmol/L    Potassium 4.7 3.5 - 5.2 mmol/L    Chloride 103 98 - 107 mmol/L    CO2 27.6 22.0 - 29.0 mmol/L    Calcium 9.6 8.6 - 10.5 mg/dL    Total Protein 7.2 6.0 - 8.5 g/dL    Albumin 4.60 3.50 - 5.20 g/dL    ALT (SGPT) 49 (H) 1 - 41 U/L    AST (SGOT) 36 1 - 40 U/L    Alkaline Phosphatase 73 39 - 117 U/L    Total Bilirubin 1.0 0.0 - 1.2 mg/dL    Globulin 2.6 gm/dL    A/G Ratio 1.8 g/dL    BUN/Creatinine Ratio 10.9 7.0 - 25.0    Anion Gap 10.4 5.0 - 15.0 mmol/L    eGFR 105.8 >60.0 mL/min/1.73   Lipid Panel    Collection Time: 10/04/22 10:28 AM    Specimen: Blood   Result Value Ref Range    Total Cholesterol 216 (H) 0 - 200 mg/dL    Triglycerides 77 0 - 150 mg/dL    HDL Cholesterol 108 (H) 40 - 60 mg/dL    LDL Cholesterol  95 0 - 100 mg/dL    VLDL Cholesterol 13 5 - 40 mg/dL    LDL/HDL Ratio 0.86    PSA Screen    Collection Time: 10/04/22 10:28 AM    Specimen: Blood   Result Value Ref Range    PSA 0.690 0.000 - 4.000 ng/mL   TSH    Collection Time: 10/04/22 10:28 AM    Specimen: Blood   Result Value Ref Range    TSH 5.130 (H) 0.270 - 4.200 uIU/mL   T4, Free    Collection Time: 10/04/22 10:28 AM    Specimen: Blood   Result Value  Ref Range    Free T4 1.41 0.93 - 1.70 ng/dL   T3, Free    Collection Time: 10/04/22 10:28 AM    Specimen: Blood   Result Value Ref Range    T3, Free 4.37 2.00 - 4.40 pg/mL   Vitamin D 25 Hydroxy    Collection Time: 10/04/22 10:28 AM    Specimen: Blood   Result Value Ref Range    25 Hydroxy, Vitamin D 26.5 (L) 30.0 - 100.0 ng/ml   CBC Auto Differential    Collection Time: 10/04/22 10:28 AM    Specimen: Blood   Result Value Ref Range    WBC 5.13 3.40 - 10.80 10*3/mm3    RBC 4.95 4.14 - 5.80 10*6/mm3    Hemoglobin 16.1 13.0 - 17.7 g/dL    Hematocrit 47.2 37.5 - 51.0 %    MCV 95.4 79.0 - 97.0 fL    MCH 32.5 26.6 - 33.0 pg    MCHC 34.1 31.5 - 35.7 g/dL    RDW 12.8 12.3 - 15.4 %    RDW-SD 44.5 37.0 - 54.0 fl    MPV 10.7 6.0 - 12.0 fL    Platelets 218 140 - 450 10*3/mm3    Neutrophil % 51.5 42.7 - 76.0 %    Lymphocyte % 30.6 19.6 - 45.3 %    Monocyte % 14.4 (H) 5.0 - 12.0 %    Eosinophil % 1.9 0.3 - 6.2 %    Basophil % 1.4 0.0 - 1.5 %    Immature Grans % 0.2 0.0 - 0.5 %    Neutrophils, Absolute 2.64 1.70 - 7.00 10*3/mm3    Lymphocytes, Absolute 1.57 0.70 - 3.10 10*3/mm3    Monocytes, Absolute 0.74 0.10 - 0.90 10*3/mm3    Eosinophils, Absolute 0.10 0.00 - 0.40 10*3/mm3    Basophils, Absolute 0.07 0.00 - 0.20 10*3/mm3    Immature Grans, Absolute 0.01 0.00 - 0.05 10*3/mm3    nRBC 0.0 0.0 - 0.2 /100 WBC     Procedures    Medication Review: Medications reviewed and noted    Social History     Socioeconomic History   • Marital status:    Tobacco Use   • Smoking status: Light Tobacco Smoker     Packs/day: 0.25     Years: 10.00     Pack years: 2.50     Types: Cigarettes     Start date: 1/8/2004   • Smokeless tobacco: Former User   • Tobacco comment: occasionally    Substance and Sexual Activity   • Alcohol use: Yes     Comment: 8-10/week   • Drug use: No   • Sexual activity: Defer        Assessment/Plan:    Problem List Items Addressed This Visit        Allergies and Adverse Reactions    Allergic rhinitis    Overview      "Chronic allergic rhinitis, unspecified seasonality, unspecified trigger.  Continue loratadine 10 mg tablets daily.            Cardiac and Vasculature    Lipid screening    Overview     Try and eat a low-fat, low-cholesterol diet.         Relevant Orders    Lipid Panel (Completed)       Gastrointestinal Abdominal     Diverticulitis    Overview     History of abscess due to diverticulitis.  Has completed colonoscopy with Dr. Beck.  Reports he feels some lower discomfort.  I have advised him to start Metamucil every morning.  We will check labs today.         Relevant Orders    CBC & Differential (Completed)    Comprehensive Metabolic Panel (Completed)       Genitourinary and Reproductive     Screening PSA (prostate specific antigen)    Relevant Orders    PSA Screen (Completed)       Health Encounters    Encounter for preventive health examination - Primary    Overview     Arie is 42-year-old male who presents for routine physical.  He has completed colonoscopy.  He has had J&J vaccine with J&J booster.  Fasting labs today.    Declines flu shot today.            Skin    Rosacea (Chronic)    Overview     Petra Mckinnonysara 150mg daily.         Relevant Medications    RHOFADE 1 % cream    Myorisan 20 MG capsule    sulfacetamide sodium-sulfur (AVAR,PLEXION) 10-5 % topical emulsion       Sleep    Sleep apnea (Chronic)    Overview     wears CPAP \"sometimes\"            Tobacco    Tobacco abuse    Overview     Reports smoking half a pack per day.           Other Visit Diagnoses     Thyroid disorder screening        Relevant Orders    TSH (Completed)    T4, Free (Completed)    T3, Free (Completed)    Vitamin D deficiency        Relevant Orders    Vitamin D 25 Hydroxy (Completed)           Patient Instructions   BMI for Adults  What is BMI?  Body mass index (BMI) is a number that is calculated from a person's weight and height. BMI can help estimate how much of a person's weight is composed of fat. BMI does not measure " "body fat directly. Rather, it is an alternative to procedures that directly measure body fat, which can be difficult and expensive.  BMI can help identify people who may be at higher risk for certain medical problems.  What are BMI measurements used for?  BMI is used as a screening tool to identify possible weight problems. It helps determine whether a person is obese, overweight, a healthy weight, or underweight.  BMI is useful for:  • Identifying a weight problem that may be related to a medical condition or may increase the risk for medical problems.  • Promoting changes, such as changes in diet and exercise, to help reach a healthy weight. BMI screening can be repeated to see if these changes are working.  How is BMI calculated?  BMI involves measuring your weight in relation to your height. Both height and weight are measured, and the BMI is calculated from those numbers. This can be done either in English ([place] or metric measurements. Note that charts and online BMI calculators are available to help you find your BMI quickly and easily without having to do these calculations yourself.  To calculate your BMI in English ([place] measurements:    1. Measure your weight in pounds (lb).  2. Multiply the number of pounds by 703.  ? For example, for a person who weighs 180 lb, multiply that number by 703, which equals 126,540.  3. Measure your height in inches. Then multiply that number by itself to get a measurement called \"inches squared.\"  ? For example, for a person who is 70 inches tall, the \"inches squared\" measurement is 70 inches x 70 inches, which equals 4,900 inches squared.  4. Divide the total from step 2 (number of lb x 703) by the total from step 3 (inches squared): 126,540 ÷ 4,900 = 25.8. This is your BMI.  To calculate your BMI in metric measurements:  1. Measure your weight in kilograms (kg).  2. Measure your height in meters (m). Then multiply that number by itself to get a measurement called \"meters " "squared.\"  ? For example, for a person who is 1.75 m tall, the \"meters squared\" measurement is 1.75 m x 1.75 m, which is equal to 3.1 meters squared.  3. Divide the number of kilograms (your weight) by the meters squared number. In this example: 70 ÷ 3.1 = 22.6. This is your BMI.  What do the results mean?  BMI charts are used to identify whether you are underweight, normal weight, overweight, or obese. The following guidelines will be used:  • Underweight: BMI less than 18.5.  • Normal weight: BMI between 18.5 and 24.9.  • Overweight: BMI between 25 and 29.9.  • Obese: BMI of 30 or above.  Keep these notes in mind:  • Weight includes both fat and muscle, so someone with a muscular build, such as an athlete, may have a BMI that is higher than 24.9. In cases like these, BMI is not an accurate measure of body fat.  • To determine if excess body fat is the cause of a BMI of 25 or higher, further assessments may need to be done by a health care provider.  • BMI is usually interpreted in the same way for men and women.  Where to find more information  For more information about BMI, including tools to quickly calculate your BMI, go to these websites:  • Centers for Disease Control and Prevention: www.cdc.gov  • American Heart Association: www.heart.org  • National Heart, Lung, and Blood [place]: www.nhlbi.nih.gov  Summary  • Body mass index (BMI) is a number that is calculated from a person's weight and height.  • BMI may help estimate how much of a person's weight is composed of fat. BMI can help identify those who may be at higher risk for certain medical problems.  • BMI can be measured using English measurements or metric measurements.  • BMI charts are used to identify whether you are underweight, normal weight, overweight, or obese.  This information is not intended to replace advice given to you by your health care provider. Make sure you discuss any questions you have with your health care provider.  Document " Revised: 09/09/2020 Document Reviewed: 07/17/2020  Elsevier Patient Education © 2022 [employer]         Plan of care reviewed with patient at the conclusion of today's visit. Education was provided regarding diagnosis, management, and any prescribed or recommended OTC medications.Patient verbalizes understanding of and agreement with management plan.         I spent 20 minutes caring for Arie on this date of service. This time includes time spent by me in the following activities:preparing for the visit, reviewing tests, obtaining and/or reviewing a separately obtained history, performing a medically appropriate examination and/or evaluation , counseling and educating the patient/family/caregiver, ordering medications, tests, or procedures, referring and communicating with other health care professionals  and documenting information in the medical record    Rosemary Long PA-C      Note: Part of this note may be an electronic transcription/translation of spoken language to printed text using the Dragon Dictation system.    Transcribed from ambient dictation for Rosemary Long PA-C by Brooklyn Green.  10/04/22   11:53 EDT    Patient verbalized consent to the visit recording.  I have personally performed the services described in this document as transcribed by the above individual, and it is both accurate and complete.  Rosemary Long PA-C  10/6/2022  10:52 EDT

## 2022-10-06 VITALS
TEMPERATURE: 97.8 F | SYSTOLIC BLOOD PRESSURE: 130 MMHG | BODY MASS INDEX: 30.2 KG/M2 | HEIGHT: 72 IN | DIASTOLIC BLOOD PRESSURE: 88 MMHG | HEART RATE: 86 BPM | WEIGHT: 223 LBS

## 2022-10-06 NOTE — PATIENT INSTRUCTIONS

## 2023-03-14 ENCOUNTER — LAB (OUTPATIENT)
Dept: LAB | Facility: HOSPITAL | Age: 43
End: 2023-03-14
Payer: COMMERCIAL

## 2023-03-14 ENCOUNTER — OFFICE VISIT (OUTPATIENT)
Dept: INTERNAL MEDICINE | Facility: CLINIC | Age: 43
End: 2023-03-14
Payer: COMMERCIAL

## 2023-03-14 VITALS
DIASTOLIC BLOOD PRESSURE: 98 MMHG | SYSTOLIC BLOOD PRESSURE: 144 MMHG | BODY MASS INDEX: 30.48 KG/M2 | HEART RATE: 89 BPM | TEMPERATURE: 98.7 F | OXYGEN SATURATION: 99 % | HEIGHT: 72 IN | WEIGHT: 225 LBS

## 2023-03-14 DIAGNOSIS — M54.41 LOW BACK PAIN WITH RIGHT-SIDED SCIATICA, UNSPECIFIED BACK PAIN LATERALITY, UNSPECIFIED CHRONICITY: Primary | ICD-10-CM

## 2023-03-14 DIAGNOSIS — E55.9 VITAMIN D DEFICIENCY: ICD-10-CM

## 2023-03-14 DIAGNOSIS — Z13.29 THYROID DISORDER SCREENING: ICD-10-CM

## 2023-03-14 DIAGNOSIS — Z13.220 LIPID SCREENING: ICD-10-CM

## 2023-03-14 DIAGNOSIS — Z13.21 ENCOUNTER FOR VITAMIN DEFICIENCY SCREENING: ICD-10-CM

## 2023-03-14 DIAGNOSIS — M54.41 LOW BACK PAIN WITH RIGHT-SIDED SCIATICA, UNSPECIFIED BACK PAIN LATERALITY, UNSPECIFIED CHRONICITY: ICD-10-CM

## 2023-03-14 DIAGNOSIS — R03.0 ELEVATED BLOOD PRESSURE READING: ICD-10-CM

## 2023-03-14 LAB
BILIRUB BLD-MCNC: NEGATIVE MG/DL
CLARITY, POC: CLEAR
COLOR UR: YELLOW
EXPIRATION DATE: NORMAL
GLUCOSE UR STRIP-MCNC: NEGATIVE MG/DL
KETONES UR QL: NEGATIVE
LEUKOCYTE EST, POC: NEGATIVE
Lab: NORMAL
NITRITE UR-MCNC: NEGATIVE MG/ML
PH UR: 7 [PH] (ref 5–8)
PROT UR STRIP-MCNC: NEGATIVE MG/DL
RBC # UR STRIP: NEGATIVE /UL
SP GR UR: 1.01 (ref 1–1.03)
UROBILINOGEN UR QL: NORMAL

## 2023-03-14 PROCEDURE — 99214 OFFICE O/P EST MOD 30 MIN: CPT | Performed by: PHYSICIAN ASSISTANT

## 2023-03-14 PROCEDURE — 82607 VITAMIN B-12: CPT

## 2023-03-14 PROCEDURE — 82306 VITAMIN D 25 HYDROXY: CPT

## 2023-03-14 PROCEDURE — 80050 GENERAL HEALTH PANEL: CPT

## 2023-03-14 PROCEDURE — 81003 URINALYSIS AUTO W/O SCOPE: CPT | Performed by: PHYSICIAN ASSISTANT

## 2023-03-14 PROCEDURE — 96372 THER/PROPH/DIAG INJ SC/IM: CPT | Performed by: PHYSICIAN ASSISTANT

## 2023-03-14 PROCEDURE — 80061 LIPID PANEL: CPT

## 2023-03-14 PROCEDURE — 84481 FREE ASSAY (FT-3): CPT

## 2023-03-14 PROCEDURE — 84439 ASSAY OF FREE THYROXINE: CPT

## 2023-03-14 RX ORDER — TRIAMCINOLONE ACETONIDE 40 MG/ML
80 INJECTION, SUSPENSION INTRA-ARTICULAR; INTRAMUSCULAR ONCE
Status: COMPLETED | OUTPATIENT
Start: 2023-03-14 | End: 2023-03-14

## 2023-03-14 RX ADMIN — TRIAMCINOLONE ACETONIDE 80 MG: 40 INJECTION, SUSPENSION INTRA-ARTICULAR; INTRAMUSCULAR at 16:02

## 2023-03-14 NOTE — PATIENT INSTRUCTIONS

## 2023-03-14 NOTE — PROGRESS NOTES
"[facility] Internal Medicine    Arie Hayden  1980   [id number]      Patient Care Team:  Rosemary Long PA-C as PCP - General (Internal Medicine)  Mitchel Beck MD as Consulting Physician (Colon and Rectal Surgery)    Chief Complaint::   Chief Complaint   Patient presents with   • Back Pain        HPI  Arie Hayden is a 42-year-old male date of birth 1980 who presents today for evaluation of low back pain.  He reports over a week ago, he developed right-sided low lumbar pain that radiated down his leg.  Reports numbness and tingling at the lower leg.  He does not recall injury.  Pain is best when he is laying down, gradually worsens throughout the day as he is sitting.  He has started some low back stretches for sciatica, which has helped.  Reports he has increased urinating for the past week.  He denies abdominal pain, dysuria, or hematuria.    Patient Active Problem List   Diagnosis   • Left lower quadrant pain   • Sepsis    • Tobacco abuse   • Abscess of sigmoid colon due to diverticulitis   • Allergic rhinitis   • Fatigue   • Snores   • Diverticulitis   • Sleep apnea   • Rosacea   • Alcohol use   • Encounter for preventive health examination   • Lipid screening   • Screening PSA (prostate specific antigen)   • Elevated blood pressure reading   • Low back pain with right-sided sciatica   • Thyroid disorder screening   • Vitamin D deficiency   • Encounter for vitamin deficiency screening        Past Medical History:   Diagnosis Date   • Allergic rhinitis    • Diverticulitis 01/15/2019   • Diverticulosis    • Environmental allergies    • Fatigue    • Rosacea    • Sleep apnea     wears CPAP \"sometimes\"   • Snores        Past Surgical History:   Procedure Laterality Date   • COLON RESECTION N/A 5/6/2019    Procedure: LOWER ANTERIOR RESECTION, APPENDECTOMY, TAKEDOWN OF SPLENIC FLEXURE, EXCISION AND CLOSURE OF COLOCECAL FISTULA, UMBILICAL HERNIA REPAIR, AND PROCTOSCOPY;  Surgeon: Mitchel Beck " "MD MARGIE;  Location: [facility] OR;  Service: General   • COLONOSCOPY     • CT ABSCESS DRAIN CYST ASP     • TONSILLECTOMY  1987   • VASECTOMY  2011   • WISDOM TOOTH EXTRACTION         Family History   Problem Relation Age of Onset   • Factor V Leiden deficiency Father    • Suicidality Father    • Factor IX deficiency Father    • Breast cancer Maternal Grandmother    • Lung cancer Maternal Grandfather    • Factor V Leiden deficiency Paternal Grandfather    • Prostate cancer Paternal Grandfather    • Hyperlipidemia Mother    • Hypothyroidism Mother    • No Known Problems Sister    • No Known Problems Brother    • No Known Problems Daughter    • No Known Problems Daughter        Social History     Socioeconomic History   • Marital status:    Tobacco Use   • Smoking status: Light Smoker     Packs/day: 0.25     Years: 10.00     Pack years: 2.50     Types: Cigarettes     Start date: 1/8/2004   • Smokeless tobacco: Former   • Tobacco comments:     occasionally    Substance and Sexual Activity   • Alcohol use: Yes     Comment: 8-10/week   • Drug use: No   • Sexual activity: Defer       Allergies   Allergen Reactions   • Pollen Extract Other (See Comments)     Sinus related       Review of Systems   Constitutional: Negative for activity change and appetite change.   Gastrointestinal: Negative for abdominal distention, abdominal pain, constipation and diarrhea.   Endocrine: Negative for cold intolerance and heat intolerance.   Genitourinary: Positive for urgency.   Musculoskeletal: Positive for arthralgias and back pain. Negative for gait problem.   Allergic/Immunologic: Negative for environmental allergies and food allergies.   Neurological: Positive for headache.        Vital Signs  Vitals:    03/14/23 1402   BP: 144/98   BP Location: Left arm   Patient Position: Sitting   Cuff Size: Large Adult   Pulse: 89   Temp: 98.7 °F (37.1 °C)   TempSrc: Temporal   SpO2: 99%   Weight: 102 kg (225 lb)   Height: 182.9 cm (72.01\") "   PainSc: 0-No pain  Comment: 8 comes and goes   PainLoc: Back     Body mass index is 30.51 kg/m².  BMI is >= 30 and <35. (Class 1 Obesity). The following options were offered after discussion;: weight loss educational material (shared in after visit summary), exercise counseling/recommendations and nutrition counseling/recommendations     Advance Care Planning   ACP discussion was held with the patient during this visit. Patient does not have an advance directive, information provided.       Current Outpatient Medications:   •  loratadine (CLARITIN) 10 MG tablet, Take 1 tablet by mouth Daily As Needed., Disp: , Rfl:   •  Seysara 150 MG tablet, , Disp: , Rfl:   No current facility-administered medications for this visit.    Physical Exam  Vitals reviewed.   Constitutional:       Appearance: Normal appearance.   HENT:      Head: Normocephalic and atraumatic.   Eyes:      Conjunctiva/sclera: Conjunctivae normal.      Pupils: Pupils are equal, round, and reactive to light.   Cardiovascular:      Rate and Rhythm: Normal rate and regular rhythm.      Pulses: Normal pulses.   Pulmonary:      Effort: Pulmonary effort is normal.      Breath sounds: Normal breath sounds. No wheezing.   Abdominal:      General: Abdomen is flat. Bowel sounds are normal.      Tenderness: There is no abdominal tenderness. There is no right CVA tenderness, left CVA tenderness or guarding.   Musculoskeletal:         General: Tenderness present.      Cervical back: Normal range of motion and neck supple.      Right lower leg: No edema.        Legs:    Neurological:      Mental Status: He is alert.          ACE III MINI            Results Review:    Recent Results (from the past 672 hour(s))   POC Urinalysis Dipstick, Automated    Collection Time: 03/14/23  2:24 PM    Specimen: Urine   Result Value Ref Range    Color Yellow Yellow, Straw, Dark Yellow, Iza    Clarity, UA Clear Clear    Specific Gravity  1.010 1.005 - 1.030    pH, Urine 7.0 5.0 -  8.0    Leukocytes Negative Negative    Nitrite, UA Negative Negative    Protein, POC Negative Negative mg/dL    Glucose, UA Negative Negative mg/dL    Ketones, UA Negative Negative    Urobilinogen, UA 0.2 E.U./dL Normal, 0.2 E.U./dL    Bilirubin Negative Negative    Blood, UA Negative Negative    Lot Number [id number]     Expiration Date 01/31/2024      Procedures    Medication Review: Medications reviewed and noted    Social History     Socioeconomic History   • Marital status:    Tobacco Use   • Smoking status: Light Smoker     Packs/day: 0.25     Years: 10.00     Pack years: 2.50     Types: Cigarettes     Start date: 1/8/2004   • Smokeless tobacco: Former   • Tobacco comments:     occasionally    Substance and Sexual Activity   • Alcohol use: Yes     Comment: 8-10/week   • Drug use: No   • Sexual activity: Defer        Assessment/Plan:    Diagnoses and all orders for this visit:    1. Low back pain with right-sided sciatica, unspecified back pain laterality, unspecified chronicity (Primary)  -     POC Urinalysis Dipstick, Automated  -     Ambulatory Referral to Physical Therapy Evaluate and treat  -     triamcinolone acetonide (KENALOG-40) injection 80 mg  -     CBC & Differential; Future  -     Comprehensive Metabolic Panel; Future    2. Thyroid disorder screening  -     TSH; Future  -     T4, Free; Future  -     T3, Free; Future    3. Vitamin D deficiency  -     Vitamin D,25-Hydroxy; Future    4. Lipid screening  Overview:  Try and eat a low-fat, low-cholesterol diet.    Orders:  -     Lipid Panel; Future    5. Encounter for vitamin deficiency screening  -     Vitamin B12; Future    6. Elevated blood pressure reading  Overview:  Patient reports history of increased alcohol use over the past week.  Blood pressure today 150/98.  I have encouraged him to purchase blood pressure monitor.  We will continue to monitor.  Follow-up in 2 weeks.         Patient Instructions   BMI for Adults  What is BMI?  Body mass  "index (BMI) is a number that is calculated from a person's weight and height. BMI can help estimate how much of a person's weight is composed of fat. BMI does not measure body fat directly. Rather, it is an alternative to procedures that directly measure body fat, which can be difficult and expensive.  BMI can help identify people who may be at higher risk for certain medical problems.  What are BMI measurements used for?  BMI is used as a screening tool to identify possible weight problems. It helps determine whether a person is obese, overweight, a healthy weight, or underweight.  BMI is useful for:  • Identifying a weight problem that may be related to a medical condition or may increase the risk for medical problems.  • Promoting changes, such as changes in diet and exercise, to help reach a healthy weight. BMI screening can be repeated to see if these changes are working.  How is BMI calculated?  BMI involves measuring your weight in relation to your height. Both height and weight are measured, and the BMI is calculated from those numbers. This can be done either in English (U[place] or metric measurements. Note that charts and online BMI calculators are available to help you find your BMI quickly and easily without having to do these calculations yourself.  To calculate your BMI in English (U[place] measurements:    1. Measure your weight in pounds (lb).  2. Multiply the number of pounds by 703.  ? For example, for a person who weighs 180 lb, multiply that number by 703, which equals 126,540.  3. Measure your height in inches. Then multiply that number by itself to get a measurement called \"inches squared.\"  ? For example, for a person who is 70 inches tall, the \"inches squared\" measurement is 70 inches x 70 inches, which equals 4,900 inches squared.  4. Divide the total from step 2 (number of lb x 703) by the total from step 3 (inches squared): 126,540 ÷ 4,900 = 25.8. This is your BMI.  To calculate your BMI in " "metric measurements:  1. Measure your weight in kilograms (kg).  2. Measure your height in meters (m). Then multiply that number by itself to get a measurement called \"meters squared.\"  ? For example, for a person who is 1.75 m tall, the \"meters squared\" measurement is 1.75 m x 1.75 m, which is equal to 3.1 meters squared.  3. Divide the number of kilograms (your weight) by the meters squared number. In this example: 70 ÷ 3.1 = 22.6. This is your BMI.  What do the results mean?  BMI charts are used to identify whether you are underweight, normal weight, overweight, or obese. The following guidelines will be used:  • Underweight: BMI less than 18.5.  • Normal weight: BMI between 18.5 and 24.9.  • Overweight: BMI between 25 and 29.9.  • Obese: BMI of 30 or above.  Keep these notes in mind:  • Weight includes both fat and muscle, so someone with a muscular build, such as an athlete, may have a BMI that is higher than 24.9. In cases like these, BMI is not an accurate measure of body fat.  • To determine if excess body fat is the cause of a BMI of 25 or higher, further assessments may need to be done by a health care provider.  • BMI is usually interpreted in the same way for men and women.  Where to find more information  For more information about BMI, including tools to quickly calculate your BMI, go to these websites:  • Centers for Disease Control and Prevention: www.cdc.gov  • American Heart Association: www.heart.org  • National Heart, Lung, and Blood Joice: www.nhlbi.nih.gov  Summary  • Body mass index (BMI) is a number that is calculated from a person's weight and height.  • BMI may help estimate how much of a person's weight is composed of fat. BMI can help identify those who may be at higher risk for certain medical problems.  • BMI can be measured using English measurements or metric measurements.  • BMI charts are used to identify whether you are underweight, normal weight, overweight, or obese.  This " information is not intended to replace advice given to you by your health care provider. Make sure you discuss any questions you have with your health care provider.  Document Revised: 09/09/2020 Document Reviewed: 07/17/2020  Elsevier Patient Education © 2022 [employer]         Plan of care reviewed with patient at the conclusion of today's visit. Education was provided regarding diagnosis, management, and any prescribed or recommended OTC medications.Patient verbalizes understanding of and agreement with management plan.       I spent 25 minutes caring for Arie on this date of service. This time includes time spent by me in the following activities:preparing for the visit, reviewing tests, obtaining and/or reviewing a separately obtained history, performing a medically appropriate examination and/or evaluation , counseling and educating the patient/family/caregiver, ordering medications, tests, or procedures, referring and communicating with other health care professionals  and documenting information in the medical record    Rosemary Long PA-C      Note: Part of this note may be an electronic transcription/translation of spoken language to printed text using the Dragon Dictation system.

## 2023-03-15 LAB
25(OH)D3 SERPL-MCNC: 20.6 NG/ML (ref 30–100)
ALBUMIN SERPL-MCNC: 5 G/DL (ref 3.5–5.2)
ALBUMIN/GLOB SERPL: 1.8 G/DL
ALP SERPL-CCNC: 66 U/L (ref 39–117)
ALT SERPL W P-5'-P-CCNC: 55 U/L (ref 1–41)
ANION GAP SERPL CALCULATED.3IONS-SCNC: 14.7 MMOL/L (ref 5–15)
AST SERPL-CCNC: 45 U/L (ref 1–40)
BASOPHILS # BLD AUTO: 0.08 10*3/MM3 (ref 0–0.2)
BASOPHILS NFR BLD AUTO: 0.8 % (ref 0–1.5)
BILIRUB SERPL-MCNC: 1 MG/DL (ref 0–1.2)
BUN SERPL-MCNC: 10 MG/DL (ref 6–20)
BUN/CREAT SERPL: 12.3 (ref 7–25)
CALCIUM SPEC-SCNC: 10 MG/DL (ref 8.6–10.5)
CHLORIDE SERPL-SCNC: 100 MMOL/L (ref 98–107)
CHOLEST SERPL-MCNC: 242 MG/DL (ref 0–200)
CO2 SERPL-SCNC: 26.3 MMOL/L (ref 22–29)
CREAT SERPL-MCNC: 0.81 MG/DL (ref 0.76–1.27)
DEPRECATED RDW RBC AUTO: 43 FL (ref 37–54)
EGFRCR SERPLBLD CKD-EPI 2021: 112.2 ML/MIN/1.73
EOSINOPHIL # BLD AUTO: 0.08 10*3/MM3 (ref 0–0.4)
EOSINOPHIL NFR BLD AUTO: 0.8 % (ref 0.3–6.2)
ERYTHROCYTE [DISTWIDTH] IN BLOOD BY AUTOMATED COUNT: 12.6 % (ref 12.3–15.4)
GLOBULIN UR ELPH-MCNC: 2.8 GM/DL
GLUCOSE SERPL-MCNC: 81 MG/DL (ref 65–99)
HCT VFR BLD AUTO: 46.9 % (ref 37.5–51)
HDLC SERPL-MCNC: 126 MG/DL (ref 40–60)
HGB BLD-MCNC: 15.6 G/DL (ref 13–17.7)
IMM GRANULOCYTES # BLD AUTO: 0.04 10*3/MM3 (ref 0–0.05)
IMM GRANULOCYTES NFR BLD AUTO: 0.4 % (ref 0–0.5)
LDLC SERPL CALC-MCNC: 102 MG/DL (ref 0–100)
LDLC/HDLC SERPL: 0.79 {RATIO}
LYMPHOCYTES # BLD AUTO: 1.4 10*3/MM3 (ref 0.7–3.1)
LYMPHOCYTES NFR BLD AUTO: 14.6 % (ref 19.6–45.3)
MCH RBC QN AUTO: 31.4 PG (ref 26.6–33)
MCHC RBC AUTO-ENTMCNC: 33.3 G/DL (ref 31.5–35.7)
MCV RBC AUTO: 94.4 FL (ref 79–97)
MONOCYTES # BLD AUTO: 0.91 10*3/MM3 (ref 0.1–0.9)
MONOCYTES NFR BLD AUTO: 9.5 % (ref 5–12)
NEUTROPHILS NFR BLD AUTO: 7.1 10*3/MM3 (ref 1.7–7)
NEUTROPHILS NFR BLD AUTO: 73.9 % (ref 42.7–76)
NRBC BLD AUTO-RTO: 0 /100 WBC (ref 0–0.2)
PLATELET # BLD AUTO: 245 10*3/MM3 (ref 140–450)
PMV BLD AUTO: 10.7 FL (ref 6–12)
POTASSIUM SERPL-SCNC: 3.8 MMOL/L (ref 3.5–5.2)
PROT SERPL-MCNC: 7.8 G/DL (ref 6–8.5)
RBC # BLD AUTO: 4.97 10*6/MM3 (ref 4.14–5.8)
SODIUM SERPL-SCNC: 141 MMOL/L (ref 136–145)
T3FREE SERPL-MCNC: 4.11 PG/ML (ref 2–4.4)
T4 FREE SERPL-MCNC: 1.63 NG/DL (ref 0.93–1.7)
TRIGL SERPL-MCNC: 83 MG/DL (ref 0–150)
TSH SERPL DL<=0.05 MIU/L-ACNC: 4.83 UIU/ML (ref 0.27–4.2)
VIT B12 BLD-MCNC: 564 PG/ML (ref 211–946)
VLDLC SERPL-MCNC: 14 MG/DL (ref 5–40)
WBC NRBC COR # BLD: 9.61 10*3/MM3 (ref 3.4–10.8)

## 2023-03-16 RX ORDER — ERGOCALCIFEROL 1.25 MG/1
50000 CAPSULE ORAL WEEKLY
Qty: 4 CAPSULE | Refills: 3 | Status: SHIPPED | OUTPATIENT
Start: 2023-03-16

## 2023-03-31 ENCOUNTER — OFFICE VISIT (OUTPATIENT)
Dept: INTERNAL MEDICINE | Facility: CLINIC | Age: 43
End: 2023-03-31
Payer: COMMERCIAL

## 2023-03-31 VITALS
SYSTOLIC BLOOD PRESSURE: 130 MMHG | BODY MASS INDEX: 29.39 KG/M2 | HEART RATE: 80 BPM | WEIGHT: 217 LBS | HEIGHT: 72 IN | DIASTOLIC BLOOD PRESSURE: 88 MMHG | TEMPERATURE: 98 F

## 2023-03-31 DIAGNOSIS — Z13.220 LIPID SCREENING: ICD-10-CM

## 2023-03-31 DIAGNOSIS — E66.3 OVERWEIGHT: ICD-10-CM

## 2023-03-31 DIAGNOSIS — R03.0 ELEVATED BLOOD PRESSURE READING: Primary | ICD-10-CM

## 2023-03-31 DIAGNOSIS — G47.33 OBSTRUCTIVE SLEEP APNEA SYNDROME: ICD-10-CM

## 2023-03-31 DIAGNOSIS — M54.41 LOW BACK PAIN WITH RIGHT-SIDED SCIATICA, UNSPECIFIED BACK PAIN LATERALITY, UNSPECIFIED CHRONICITY: ICD-10-CM

## 2023-03-31 DIAGNOSIS — E55.9 VITAMIN D DEFICIENCY: ICD-10-CM

## 2023-03-31 NOTE — PROGRESS NOTES
[facility] Internal Medicine    Arie Hayden  1980   [id number]      Patient Care Team:  Rosemary Long PA-C as PCP - General (Internal Medicine)  Mitchel Beck MD as Consulting Physician (Colon and Rectal Surgery)    Chief Complaint::   Chief Complaint   Patient presents with   • Hypertension     Follow up             HPI  Arie is a 42 year old male date of birth 1980 who presents today for follow-up.  He was last seen in the office on 3/14/2023 for evaluation of low back pain with sciatica.  He received a Kenalog injection in the office and was referred to physical therapy.  Today, he reports the steroid shot quickly improved sciatica and pain, he also had 2 follow-up visits with physical therapy which helped.  He is interested in following up with  chiropractor Dr. Malhotra.    He was also found to have elevated blood pressures at last appointment.  He has since purchased a cuff and has been checking his blood pressures at home.  He is concerned, as he has had increased stress at work.  He denies headaches, chest pain, shortness of breath, or palpitations.  He did have a higher reading at home, but reports they are back to normal in the mornings.  He did receive lab letter discussing his lab results.  He has started vitamin D supplementation due to ongoing vitamin D deficiency.  He is also concerned about his elevated liver enzymes.  He is a moderate alcohol drinker, and discussed that he would like to cut back some.  He is also interested in semaglutide, as this was offered to him from a physician friend.      Patient Active Problem List   Diagnosis   • Left lower quadrant pain   • Sepsis    • Tobacco abuse   • Abscess of sigmoid colon due to diverticulitis   • Allergic rhinitis   • Fatigue   • Snores   • Diverticulitis   • Sleep apnea   • Rosacea   • Alcohol use   • Encounter for preventive health examination   • Lipid screening   • Screening PSA (prostate specific antigen)   •  "Elevated blood pressure reading   • Low back pain with right-sided sciatica   • Thyroid disorder screening   • Vitamin D deficiency   • Encounter for vitamin deficiency screening   • Overweight        Past Medical History:   Diagnosis Date   • Allergic rhinitis    • Diverticulitis 01/15/2019   • Diverticulosis    • Environmental allergies    • Fatigue    • Rosacea    • Sleep apnea     wears CPAP \"sometimes\"   • Snores        Past Surgical History:   Procedure Laterality Date   • COLON RESECTION N/A 5/6/2019    Procedure: LOWER ANTERIOR RESECTION, APPENDECTOMY, TAKEDOWN OF SPLENIC FLEXURE, EXCISION AND CLOSURE OF COLOCECAL FISTULA, UMBILICAL HERNIA REPAIR, AND PROCTOSCOPY;  Surgeon: Mitchel Beck MD;  Location: [facility];  Service: General   • COLONOSCOPY     • CT ABSCESS DRAIN CYST ASP     • TONSILLECTOMY  1987   • VASECTOMY  2011   • WISDOM TOOTH EXTRACTION         Family History   Problem Relation Age of Onset   • Factor V Leiden deficiency Father    • Suicidality Father    • Factor IX deficiency Father    • Breast cancer Maternal Grandmother    • Lung cancer Maternal Grandfather    • Factor V Leiden deficiency Paternal Grandfather    • Prostate cancer Paternal Grandfather    • Hyperlipidemia Mother    • Hypothyroidism Mother    • No Known Problems Sister    • No Known Problems Brother    • No Known Problems Daughter    • No Known Problems Daughter        Social History     Socioeconomic History   • Marital status:    Tobacco Use   • Smoking status: Light Smoker     Packs/day: 0.25     Years: 10.00     Pack years: 2.50     Types: Cigarettes     Start date: 1/8/2004   • Smokeless tobacco: Former   • Tobacco comments:     occasionally    Substance and Sexual Activity   • Alcohol use: Yes     Comment: 8-10/week   • Drug use: No   • Sexual activity: Defer       Allergies   Allergen Reactions   • Pollen Extract Other (See Comments)     Sinus related       Review of Systems   Constitutional: Positive for " "unexpected weight loss. Negative for activity change and appetite change.   Eyes: Negative for blurred vision and double vision.   Cardiovascular: Negative for chest pain and leg swelling.   Endocrine: Negative for cold intolerance and heat intolerance.   Musculoskeletal: Positive for back pain. Negative for arthralgias.   Psychiatric/Behavioral: Positive for stress.        Vital Signs  Vitals:    03/31/23 1113   BP: 130/88   BP Location: Left arm   Patient Position: Sitting   Cuff Size: Adult   Pulse: 80   Temp: 98 °F (36.7 °C)   TempSrc: Temporal   Weight: 98.4 kg (217 lb)   Height: 182.9 cm (72.01\")   PainSc: 0-No pain     Body mass index is 29.42 kg/m².  BMI is >= 30 and <35. (Class 1 Obesity). The following options were offered after discussion;: weight loss educational material (shared in after visit summary), exercise counseling/recommendations and nutrition counseling/recommendations     Advance Care Planning   ACP discussion was held with the patient during this visit. Patient does not have an advance directive, information provided.       Current Outpatient Medications:   •  loratadine (CLARITIN) 10 MG tablet, Take 1 tablet by mouth Daily As Needed., Disp: , Rfl:   •  Seysara 150 MG tablet, , Disp: , Rfl:   •  vitamin D (ERGOCALCIFEROL) 1.25 MG (25235 UT) capsule capsule, Take 1 capsule by mouth 1 (One) Time Per Week., Disp: 4 capsule, Rfl: 3    Physical Exam  Vitals reviewed.   Constitutional:       Appearance: He is obese.   HENT:      Head: Normocephalic and atraumatic.      Nose: Nose normal.      Mouth/Throat:      Mouth: Mucous membranes are moist.      Pharynx: Oropharynx is clear.   Eyes:      Pupils: Pupils are equal, round, and reactive to light.   Cardiovascular:      Rate and Rhythm: Normal rate and regular rhythm.      Pulses: Normal pulses.      Heart sounds: Normal heart sounds.   Pulmonary:      Effort: Pulmonary effort is normal.      Breath sounds: Normal breath sounds. No wheezing. "   Abdominal:      Tenderness: There is no guarding or rebound.   Musculoskeletal:         General: Tenderness present.      Cervical back: Normal range of motion and neck supple.      Lumbar back: Decreased range of motion.   Skin:     General: Skin is warm and dry.   Neurological:      General: No focal deficit present.      Mental Status: He is alert and oriented to person, place, and time.   Psychiatric:         Behavior: Behavior normal.          ACE III MINI            Results Review:    Recent Results (from the past 672 hour(s))   POC Urinalysis Dipstick, Automated    Collection Time: 03/14/23  2:24 PM    Specimen: Urine   Result Value Ref Range    Color Yellow Yellow, Straw, Dark Yellow, Iza    Clarity, UA Clear Clear    Specific Gravity  1.010 1.005 - 1.030    pH, Urine 7.0 5.0 - 8.0    Leukocytes Negative Negative    Nitrite, UA Negative Negative    Protein, POC Negative Negative mg/dL    Glucose, UA Negative Negative mg/dL    Ketones, UA Negative Negative    Urobilinogen, UA 0.2 E.U./dL Normal, 0.2 E.U./dL    Bilirubin Negative Negative    Blood, UA Negative Negative    Lot Number [id number]     Expiration Date 01/31/2024    Comprehensive Metabolic Panel    Collection Time: 03/14/23  2:52 PM    Specimen: Blood   Result Value Ref Range    Glucose 81 65 - 99 mg/dL    BUN 10 6 - 20 mg/dL    Creatinine 0.81 0.76 - 1.27 mg/dL    Sodium 141 136 - 145 mmol/L    Potassium 3.8 3.5 - 5.2 mmol/L    Chloride 100 98 - 107 mmol/L    CO2 26.3 22.0 - 29.0 mmol/L    Calcium 10.0 8.6 - 10.5 mg/dL    Total Protein 7.8 6.0 - 8.5 g/dL    Albumin 5.0 3.5 - 5.2 g/dL    ALT (SGPT) 55 (H) 1 - 41 U/L    AST (SGOT) 45 (H) 1 - 40 U/L    Alkaline Phosphatase 66 39 - 117 U/L    Total Bilirubin 1.0 0.0 - 1.2 mg/dL    Globulin 2.8 gm/dL    A/G Ratio 1.8 g/dL    BUN/Creatinine Ratio 12.3 7.0 - 25.0    Anion Gap 14.7 5.0 - 15.0 mmol/L    eGFR 112.2 >60.0 mL/min/1.73   Lipid Panel    Collection Time: 03/14/23  2:52 PM    Specimen: Blood    Result Value Ref Range    Total Cholesterol 242 (H) 0 - 200 mg/dL    Triglycerides 83 0 - 150 mg/dL    HDL Cholesterol 126 (H) 40 - 60 mg/dL    LDL Cholesterol  102 (H) 0 - 100 mg/dL    VLDL Cholesterol 14 5 - 40 mg/dL    LDL/HDL Ratio 0.79    TSH    Collection Time: 03/14/23  2:52 PM    Specimen: Blood   Result Value Ref Range    TSH 4.830 (H) 0.270 - 4.200 uIU/mL   T4, Free    Collection Time: 03/14/23  2:52 PM    Specimen: Blood   Result Value Ref Range    Free T4 1.63 0.93 - 1.70 ng/dL   T3, Free    Collection Time: 03/14/23  2:52 PM    Specimen: Blood   Result Value Ref Range    T3, Free 4.11 2.00 - 4.40 pg/mL   Vitamin B12    Collection Time: 03/14/23  2:52 PM    Specimen: Blood   Result Value Ref Range    Vitamin B-12 564 211 - 946 pg/mL   Vitamin D,25-Hydroxy    Collection Time: 03/14/23  2:52 PM    Specimen: Blood   Result Value Ref Range    25 Hydroxy, Vitamin D 20.6 (L) 30.0 - 100.0 ng/ml   CBC Auto Differential    Collection Time: 03/14/23  2:52 PM    Specimen: Blood   Result Value Ref Range    WBC 9.61 3.40 - 10.80 10*3/mm3    RBC 4.97 4.14 - 5.80 10*6/mm3    Hemoglobin 15.6 13.0 - 17.7 g/dL    Hematocrit 46.9 37.5 - 51.0 %    MCV 94.4 79.0 - 97.0 fL    MCH 31.4 26.6 - 33.0 pg    MCHC 33.3 31.5 - 35.7 g/dL    RDW 12.6 12.3 - 15.4 %    RDW-SD 43.0 37.0 - 54.0 fl    MPV 10.7 6.0 - 12.0 fL    Platelets 245 140 - 450 10*3/mm3    Neutrophil % 73.9 42.7 - 76.0 %    Lymphocyte % 14.6 (L) 19.6 - 45.3 %    Monocyte % 9.5 5.0 - 12.0 %    Eosinophil % 0.8 0.3 - 6.2 %    Basophil % 0.8 0.0 - 1.5 %    Immature Grans % 0.4 0.0 - 0.5 %    Neutrophils, Absolute 7.10 (H) 1.70 - 7.00 10*3/mm3    Lymphocytes, Absolute 1.40 0.70 - 3.10 10*3/mm3    Monocytes, Absolute 0.91 (H) 0.10 - 0.90 10*3/mm3    Eosinophils, Absolute 0.08 0.00 - 0.40 10*3/mm3    Basophils, Absolute 0.08 0.00 - 0.20 10*3/mm3    Immature Grans, Absolute 0.04 0.00 - 0.05 10*3/mm3    nRBC 0.0 0.0 - 0.2 /100 WBC     Procedures    Medication Review:  Medications reviewed and noted    Social History     Socioeconomic History   • Marital status:    Tobacco Use   • Smoking status: Light Smoker     Packs/day: 0.25     Years: 10.00     Pack years: 2.50     Types: Cigarettes     Start date: 1/8/2004   • Smokeless tobacco: Former   • Tobacco comments:     occasionally    Substance and Sexual Activity   • Alcohol use: Yes     Comment: 8-10/week   • Drug use: No   • Sexual activity: Defer        Assessment/Plan:    Diagnoses and all orders for this visit:    1. Elevated blood pressure reading (Primary)  Overview:  Continue to check blood pressures at home.  Try and cut back on alcohol and tobacco.  We will follow-up in 1 month.      2. Obstructive sleep apnea syndrome  Overview:  Difficulty with CPAP mask.  Refer to sleep medicine to reevaluate.    Orders:  -     Ambulatory Referral to Sleep Medicine    3. Vitamin D deficiency  Overview:  He has started vitamin D 50,000 units weekly.      4. Low back pain with right-sided sciatica, unspecified back pain laterality, unspecified chronicity  Overview:  Sniffing and improvement with triamcinolone injection.  He will now go to chiropractic care through Dr. Malhotra      5. Lipid screening  Overview:  Try and eat a low-fat, low-cholesterol diet.  Lightly elevated LDL cholesterol, however he has a very high HDL cholesterol of 126.  Continue to monitor.      6. Overweight  Overview:  Trial of Wegovy.  Hoping this will decrease his back pain and improve blood pressures.         Patient Instructions   BMI for Adults  What is BMI?  Body mass index (BMI) is a number that is calculated from a person's weight and height. BMI can help estimate how much of a person's weight is composed of fat. BMI does not measure body fat directly. Rather, it is an alternative to procedures that directly measure body fat, which can be difficult and expensive.  BMI can help identify people who may be at higher risk for certain medical  "problems.  What are BMI measurements used for?  BMI is used as a screening tool to identify possible weight problems. It helps determine whether a person is obese, overweight, a healthy weight, or underweight.  BMI is useful for:  • Identifying a weight problem that may be related to a medical condition or may increase the risk for medical problems.  • Promoting changes, such as changes in diet and exercise, to help reach a healthy weight. BMI screening can be repeated to see if these changes are working.  How is BMI calculated?  BMI involves measuring your weight in relation to your height. Both height and weight are measured, and the BMI is calculated from those numbers. This can be done either in English (U[place] or metric measurements. Note that charts and online BMI calculators are available to help you find your BMI quickly and easily without having to do these calculations yourself.  To calculate your BMI in English (U.S[place] measurements:    1. Measure your weight in pounds (lb).  2. Multiply the number of pounds by 703.  ? For example, for a person who weighs 180 lb, multiply that number by 703, which equals 126,540.  3. Measure your height in inches. Then multiply that number by itself to get a measurement called \"inches squared.\"  ? For example, for a person who is 70 inches tall, the \"inches squared\" measurement is 70 inches x 70 inches, which equals 4,900 inches squared.  4. Divide the total from step 2 (number of lb x 703) by the total from step 3 (inches squared): 126,540 ÷ 4,900 = 25.8. This is your BMI.  To calculate your BMI in metric measurements:  1. Measure your weight in kilograms (kg).  2. Measure your height in meters (m). Then multiply that number by itself to get a measurement called \"meters squared.\"  ? For example, for a person who is 1.75 m tall, the \"meters squared\" measurement is 1.75 m x 1.75 m, which is equal to 3.1 meters squared.  3. Divide the number of kilograms (your weight) by the " meters squared number. In this example: 70 ÷ 3.1 = 22.6. This is your BMI.  What do the results mean?  BMI charts are used to identify whether you are underweight, normal weight, overweight, or obese. The following guidelines will be used:  • Underweight: BMI less than 18.5.  • Normal weight: BMI between 18.5 and 24.9.  • Overweight: BMI between 25 and 29.9.  • Obese: BMI of 30 or above.  Keep these notes in mind:  • Weight includes both fat and muscle, so someone with a muscular build, such as an athlete, may have a BMI that is higher than 24.9. In cases like these, BMI is not an accurate measure of body fat.  • To determine if excess body fat is the cause of a BMI of 25 or higher, further assessments may need to be done by a health care provider.  • BMI is usually interpreted in the same way for men and women.  Where to find more information  For more information about BMI, including tools to quickly calculate your BMI, go to these websites:  • Centers for Disease Control and Prevention: www.cdc.gov  • American Heart Association: www.heart.org  • National Heart, Lung, and Blood [place]: www.nhlbi.nih.gov  Summary  • Body mass index (BMI) is a number that is calculated from a person's weight and height.  • BMI may help estimate how much of a person's weight is composed of fat. BMI can help identify those who may be at higher risk for certain medical problems.  • BMI can be measured using English measurements or metric measurements.  • BMI charts are used to identify whether you are underweight, normal weight, overweight, or obese.  This information is not intended to replace advice given to you by your health care provider. Make sure you discuss any questions you have with your health care provider.  Document Revised: 09/09/2020 Document Reviewed: 07/17/2020  [employer] Patient Education © 2022 [employer]         Plan of care reviewed with patient at the conclusion of today's visit. Education was provided  regarding diagnosis, management, and any prescribed or recommended OTC medications.Patient verbalizes understanding of and agreement with management plan.         I spent 35 minutes caring for Arie on this date of service. This time includes time spent by me in the following activities:preparing for the visit, reviewing tests, obtaining and/or reviewing a separately obtained history, performing a medically appropriate examination and/or evaluation , counseling and educating the patient/family/caregiver, ordering medications, tests, or procedures, referring and communicating with other health care professionals  and documenting information in the medical record    Rosemary Long PA-C      Note: Part of this note may be an electronic transcription/translation of spoken language to printed text using the Dragon Dictation system.

## 2023-03-31 NOTE — PATIENT INSTRUCTIONS

## 2023-05-05 ENCOUNTER — OFFICE VISIT (OUTPATIENT)
Dept: INTERNAL MEDICINE | Facility: CLINIC | Age: 43
End: 2023-05-05
Payer: COMMERCIAL

## 2023-05-05 DIAGNOSIS — R03.0 ELEVATED BLOOD PRESSURE READING: Primary | ICD-10-CM

## 2023-05-05 DIAGNOSIS — E66.3 OVERWEIGHT: ICD-10-CM

## 2023-05-05 DIAGNOSIS — Z78.9 ALCOHOL USE: Chronic | ICD-10-CM

## 2023-05-05 NOTE — PROGRESS NOTES
"[facility] Internal Medicine    Arie Hayden  1980   [id number]      Patient Care Team:  Rosemary Long PA-C as PCP - General (Internal Medicine)  Mitchel Beck MD as Consulting Physician (Colon and Rectal Surgery)    Chief Complaint::   Chief Complaint   Patient presents with   • Hypertension     4 week follow up        HPI  Arie Hayden is a 43-year-old male date of birth 1980 who presents today for follow-up of hypertension and weight. This is month one on Wegovy. He reports significant decrease in appetite.  He has had difficulty eating, making himself eat He has been drinking water all day. Some constipation. Has cut back on drinking alcohol.        Patient Active Problem List   Diagnosis   • Left lower quadrant pain   • Sepsis    • Tobacco abuse   • Abscess of sigmoid colon due to diverticulitis   • Allergic rhinitis   • Fatigue   • Snores   • Diverticulitis   • Sleep apnea   • Rosacea   • Alcohol use   • Encounter for preventive health examination   • Lipid screening   • Screening PSA (prostate specific antigen)   • Elevated blood pressure reading   • Low back pain with right-sided sciatica   • Thyroid disorder screening   • Vitamin D deficiency   • Encounter for vitamin deficiency screening   • Overweight        Past Medical History:   Diagnosis Date   • Allergic    • Allergic rhinitis    • Colon polyp    • Diverticulitis 01/15/2019   • Diverticulosis    • Environmental allergies    • Fatigue    • Rosacea    • Scoliosis    • Sleep apnea     wears CPAP \"sometimes\"   • Snores        Past Surgical History:   Procedure Laterality Date   • APPENDECTOMY     • COLON RESECTION N/A 05/06/2019    Procedure: LOWER ANTERIOR RESECTION, APPENDECTOMY, TAKEDOWN OF SPLENIC FLEXURE, EXCISION AND CLOSURE OF COLOCECAL FISTULA, UMBILICAL HERNIA REPAIR, AND PROCTOSCOPY;  Surgeon: Mitchel Beck MD;  Location: [facility];  Service: General   • COLONOSCOPY     • CT ABSCESS DRAIN CYST ASP     • " TONSILLECTOMY  1987   • VASECTOMY  2011   • WISDOM TOOTH EXTRACTION         Family History   Problem Relation Age of Onset   • Factor V Leiden deficiency Father    • Suicidality Father    • Factor IX deficiency Father    • Breast cancer Maternal Grandmother    • Lung cancer Maternal Grandfather    • Factor V Leiden deficiency Paternal Grandfather    • Prostate cancer Paternal Grandfather    • Hyperlipidemia Mother    • Hypothyroidism Mother    • No Known Problems Sister    • No Known Problems Brother    • No Known Problems Daughter    • No Known Problems Daughter    • Hyperlipidemia Maternal Uncle        Social History     Socioeconomic History   • Marital status:    Tobacco Use   • Smoking status: Light Smoker     Packs/day: 0.25     Years: 10.00     Pack years: 2.50     Types: Cigarettes     Start date: 1/8/2004   • Smokeless tobacco: Former   • Tobacco comments:     occasionally    Substance and Sexual Activity   • Alcohol use: Yes     Comment: 8-10/week   • Drug use: No   • Sexual activity: Yes     Partners: Female     Birth control/protection: Vasectomy, Hysterectomy       Allergies   Allergen Reactions   • Pollen Extract Other (See Comments)     Sinus related       Review of Systems   Constitutional: Positive for appetite change and unexpected weight loss. Negative for activity change, diaphoresis, fatigue and unexpected weight gain.   HENT: Negative for hearing loss.    Eyes: Negative for visual disturbance.   Respiratory: Negative for chest tightness and shortness of breath.    Cardiovascular: Negative for chest pain, palpitations and leg swelling.   Gastrointestinal: Negative for abdominal pain, blood in stool, GERD and indigestion.   Endocrine: Negative for cold intolerance and heat intolerance.   Genitourinary: Negative for dysuria and hematuria.   Musculoskeletal: Negative for arthralgias and myalgias.   Skin: Negative for skin lesions.   Neurological: Negative for tremors, seizures, syncope,  "speech difficulty, weakness, headache, memory problem and confusion.   Hematological: Does not bruise/bleed easily.   Psychiatric/Behavioral: Negative for sleep disturbance and depressed mood. The patient is not nervous/anxious.         Vital Signs  Vitals:    05/05/23 1101 05/07/23 1710   BP: 132/90 130/80   BP Location: Right arm    Patient Position: Sitting    Cuff Size: Adult    Pulse: 88    Weight: 96.6 kg (213 lb)    Height: 182.9 cm (72\")    PainSc: 0-No pain      Body mass index is 28.89 kg/m².  BMI is >= 25 and <30. (Overweight) The following options were offered after discussion;: weight loss educational material (shared in after visit summary), exercise counseling/recommendations, nutrition counseling/recommendations and pharmacological intervention options     Advance Care Planning   ACP discussion was held with the patient during this visit. Patient does not have an advance directive, information provided.       Current Outpatient Medications:   •  loratadine (CLARITIN) 10 MG tablet, Take 1 tablet by mouth Daily As Needed., Disp: , Rfl:   •  Seysara 150 MG tablet, , Disp: , Rfl:   •  vitamin D (ERGOCALCIFEROL) 1.25 MG (27228 UT) capsule capsule, Take 1 capsule by mouth 1 (One) Time Per Week., Disp: 4 capsule, Rfl: 3    Physical Exam  Vitals reviewed.   Constitutional:       Appearance: Normal appearance. He is well-developed.   HENT:      Head: Normocephalic and atraumatic.      Right Ear: Hearing, tympanic membrane, ear canal and external ear normal.      Left Ear: Hearing, tympanic membrane, ear canal and external ear normal.      Nose: Nose normal.      Mouth/Throat:      Pharynx: Uvula midline.   Eyes:      General: Lids are normal.      Conjunctiva/sclera: Conjunctivae normal.      Pupils: Pupils are equal, round, and reactive to light.   Cardiovascular:      Rate and Rhythm: Normal rate and regular rhythm.      Heart sounds: Normal heart sounds.   Pulmonary:      Effort: Pulmonary effort is " normal.      Breath sounds: Normal breath sounds.   Abdominal:      General: Bowel sounds are normal.      Palpations: Abdomen is soft.   Musculoskeletal:         General: Normal range of motion.      Cervical back: Full passive range of motion without pain, normal range of motion and neck supple.   Skin:     General: Skin is warm and dry.   Neurological:      Mental Status: He is alert and oriented to person, place, and time.      Deep Tendon Reflexes: Reflexes are normal and symmetric.   Psychiatric:         Speech: Speech normal.         Behavior: Behavior normal.         Thought Content: Thought content normal.         Judgment: Judgment normal.          ACE III MINI            Results Review:    No results found for this or any previous visit (from the past 672 hour(s)).  Procedures    Medication Review: Medications reviewed and noted    Social History     Socioeconomic History   • Marital status:    Tobacco Use   • Smoking status: Light Smoker     Packs/day: 0.25     Years: 10.00     Pack years: 2.50     Types: Cigarettes     Start date: 1/8/2004   • Smokeless tobacco: Former   • Tobacco comments:     occasionally    Substance and Sexual Activity   • Alcohol use: Yes     Comment: 8-10/week   • Drug use: No   • Sexual activity: Yes     Partners: Female     Birth control/protection: Vasectomy, Hysterectomy        Assessment/Plan:    Diagnoses and all orders for this visit:    1. Elevated blood pressure reading (Primary)  Overview:  Continue to check blood pressures at home.  Try and cut back on alcohol and tobacco.  We will follow-up in 1 month.      2. Alcohol use  Overview:  Reports 8-10 drinks a week.  No hx of w/d or seizures.  He is trying to cut back.      3. Overweight  Overview:  Trial of Wegovy.  Hoping this will decrease his back pain and improve blood pressures.         There are no Patient Instructions on file for this visit.     Plan of care reviewed with patient at the conclusion of today's  visit. Education was provided regarding diagnosis, management, and any prescribed or recommended OTC medications.Patient verbalizes understanding of and agreement with management plan.         I spent 20 minutes caring for Arie on this date of service. This time includes time spent by me in the following activities:preparing for the visit, reviewing tests, obtaining and/or reviewing a separately obtained history, performing a medically appropriate examination and/or evaluation , counseling and educating the patient/family/caregiver, ordering medications, tests, or procedures, referring and communicating with other health care professionals  and documenting information in the medical record    Rosemary Long PA-C      Note: Part of this note may be an electronic transcription/translation of spoken language to printed text using the Dragon Dictation system.

## 2023-05-07 VITALS
BODY MASS INDEX: 28.85 KG/M2 | DIASTOLIC BLOOD PRESSURE: 80 MMHG | WEIGHT: 213 LBS | HEIGHT: 72 IN | HEART RATE: 88 BPM | SYSTOLIC BLOOD PRESSURE: 130 MMHG

## 2023-05-07 NOTE — PATIENT INSTRUCTIONS

## 2023-06-02 ENCOUNTER — OFFICE VISIT (OUTPATIENT)
Dept: INTERNAL MEDICINE | Facility: CLINIC | Age: 43
End: 2023-06-02

## 2023-06-02 VITALS
DIASTOLIC BLOOD PRESSURE: 82 MMHG | TEMPERATURE: 99.1 F | BODY MASS INDEX: 28.79 KG/M2 | HEIGHT: 72 IN | HEART RATE: 76 BPM | WEIGHT: 212.6 LBS | SYSTOLIC BLOOD PRESSURE: 124 MMHG

## 2023-06-02 DIAGNOSIS — E55.9 VITAMIN D DEFICIENCY: Primary | ICD-10-CM

## 2023-06-02 DIAGNOSIS — Z13.220 LIPID SCREENING: ICD-10-CM

## 2023-06-02 DIAGNOSIS — Z13.29 THYROID DISORDER SCREENING: ICD-10-CM

## 2023-06-02 PROCEDURE — 99214 OFFICE O/P EST MOD 30 MIN: CPT | Performed by: PHYSICIAN ASSISTANT

## 2023-06-02 NOTE — PATIENT INSTRUCTIONS

## 2023-06-02 NOTE — PROGRESS NOTES
"[facility] Internal Medicine    Arie Hayden  1980   [id number]      Patient Care Team:  Rosemary Long PA-C as PCP - General (Internal Medicine)  Mitchel Beck MD as Consulting Physician (Colon and Rectal Surgery)    Chief Complaint::   Chief Complaint   Patient presents with   • Hypertension     4 week follow up        HPI  Arie is a 43-year-old male date of birth 1980 who presents today for follow-up of hypertension, vitamin D deficiency, and weight.  Trial of Wegovy, patient has had limited weight loss.  Initial weight 217, now down to 212.  He has significantly cut back on food intake.  He is a moderate alcohol drinker.  He denies stomach pain, change in BMs, nausea vomiting.    Patient Active Problem List   Diagnosis   • Left lower quadrant pain   • Sepsis    • Tobacco abuse   • Abscess of sigmoid colon due to diverticulitis   • Allergic rhinitis   • Fatigue   • Snores   • Diverticulitis   • Sleep apnea   • Rosacea   • Alcohol use   • Encounter for preventive health examination   • Lipid screening   • Screening PSA (prostate specific antigen)   • Elevated blood pressure reading   • Low back pain with right-sided sciatica   • Thyroid disorder screening   • Vitamin D deficiency   • Encounter for vitamin deficiency screening   • Overweight        Past Medical History:   Diagnosis Date   • Allergic    • Allergic rhinitis    • Colon polyp    • Diverticulitis 01/15/2019   • Diverticulosis    • Environmental allergies    • Fatigue    • Rosacea    • Scoliosis    • Sleep apnea     wears CPAP \"sometimes\"   • Snores        Past Surgical History:   Procedure Laterality Date   • APPENDECTOMY     • COLON RESECTION N/A 05/06/2019    Procedure: LOWER ANTERIOR RESECTION, APPENDECTOMY, TAKEDOWN OF SPLENIC FLEXURE, EXCISION AND CLOSURE OF COLOCECAL FISTULA, UMBILICAL HERNIA REPAIR, AND PROCTOSCOPY;  Surgeon: Mitchel Beck MD;  Location: [facility];  Service: General   • COLONOSCOPY     • CT ABSCESS " DRAIN CYST ASP     • TONSILLECTOMY  1987   • VASECTOMY  2011   • WISDOM TOOTH EXTRACTION         Family History   Problem Relation Age of Onset   • Factor V Leiden deficiency Father    • Suicidality Father    • Factor IX deficiency Father    • Breast cancer Maternal Grandmother    • Lung cancer Maternal Grandfather    • Factor V Leiden deficiency Paternal Grandfather    • Prostate cancer Paternal Grandfather    • Hyperlipidemia Mother    • Hypothyroidism Mother    • No Known Problems Sister    • No Known Problems Brother    • No Known Problems Daughter    • No Known Problems Daughter    • Hyperlipidemia Maternal Uncle        Social History     Socioeconomic History   • Marital status:    Tobacco Use   • Smoking status: Light Smoker     Packs/day: 0.25     Years: 10.00     Pack years: 2.50     Types: Cigarettes     Start date: 1/8/2004   • Smokeless tobacco: Former   • Tobacco comments:     occasionally    Substance and Sexual Activity   • Alcohol use: Yes     Alcohol/week: 10.0 standard drinks     Types: 10 Drinks containing 0.5 oz of alcohol per week     Comment: 8-10/week   • Drug use: No   • Sexual activity: Yes     Partners: Female     Birth control/protection: Vasectomy, Hysterectomy       Allergies   Allergen Reactions   • Pollen Extract Other (See Comments)     Sinus related       Review of Systems   Constitutional: Positive for appetite change. Negative for activity change, diaphoresis, fatigue, unexpected weight gain and unexpected weight loss.   HENT: Negative for hearing loss.    Eyes: Negative for blurred vision, double vision and visual disturbance.   Respiratory: Negative for chest tightness and shortness of breath.    Cardiovascular: Negative for chest pain, palpitations and leg swelling.   Gastrointestinal: Negative for abdominal pain, blood in stool, GERD and indigestion.   Endocrine: Negative for cold intolerance and heat intolerance.   Genitourinary: Negative for dysuria and hematuria.  "  Musculoskeletal: Negative for arthralgias and myalgias.   Skin: Positive for bruise. Negative for skin lesions.   Neurological: Negative for tremors, seizures, syncope, speech difficulty, weakness, headache, memory problem and confusion.   Hematological: Does not bruise/bleed easily.   Psychiatric/Behavioral: Negative for sleep disturbance and depressed mood. The patient is not nervous/anxious.         Vital Signs  Vitals:    06/02/23 1027   BP: 124/82   BP Location: Left arm   Patient Position: Sitting   Cuff Size: Adult   Pulse: 76   Temp: 99.1 °F (37.3 °C)   Weight: 96.4 kg (212 lb 9.6 oz)   Height: 182.9 cm (72.01\")   PainSc: 0-No pain     Body mass index is 28.83 kg/m².  BMI is >= 25 and <30. (Overweight) The following options were offered after discussion;: weight loss educational material (shared in after visit summary), exercise counseling/recommendations and nutrition counseling/recommendations     Advance Care Planning   ACP discussion was held with the patient during this visit. Patient does not have an advance directive, information provided.       Current Outpatient Medications:   •  loratadine (CLARITIN) 10 MG tablet, Take 1 tablet by mouth Daily As Needed., Disp: , Rfl:   •  Seysara 150 MG tablet, Take 1 tablet by mouth 3 (Three) Times a Week., Disp: , Rfl:   •  vitamin D (ERGOCALCIFEROL) 1.25 MG (69846 UT) capsule capsule, Take 1 capsule by mouth 1 (One) Time Per Week., Disp: 4 capsule, Rfl: 3    Physical Exam  Vitals reviewed.   Constitutional:       Appearance: Normal appearance. He is well-developed.   HENT:      Head: Normocephalic and atraumatic.      Right Ear: Hearing, tympanic membrane, ear canal and external ear normal.      Left Ear: Hearing, tympanic membrane, ear canal and external ear normal.      Nose: Nose normal.      Mouth/Throat:      Mouth: Mucous membranes are moist.      Pharynx: Oropharynx is clear. Uvula midline.   Eyes:      General: Lids are normal.      Conjunctiva/sclera: " Conjunctivae normal.      Pupils: Pupils are equal, round, and reactive to light.   Cardiovascular:      Rate and Rhythm: Normal rate and regular rhythm.      Pulses: Normal pulses.      Heart sounds: Normal heart sounds.   Pulmonary:      Effort: Pulmonary effort is normal.      Breath sounds: Normal breath sounds.   Abdominal:      General: Bowel sounds are normal.      Palpations: Abdomen is soft.   Musculoskeletal:         General: Normal range of motion.      Cervical back: Full passive range of motion without pain, normal range of motion and neck supple.   Skin:     General: Skin is warm and dry.             Comments: hematoma   Neurological:      General: No focal deficit present.      Mental Status: He is alert and oriented to person, place, and time. Mental status is at baseline.      Deep Tendon Reflexes: Reflexes are normal and symmetric.   Psychiatric:         Speech: Speech normal.         Behavior: Behavior normal.         Thought Content: Thought content normal.         Judgment: Judgment normal.          ACE III MINI            Results Review:    No results found for this or any previous visit (from the past 672 hour(s)).  Procedures    Medication Review: Medications reviewed and noted    Social History     Socioeconomic History   • Marital status:    Tobacco Use   • Smoking status: Light Smoker     Packs/day: 0.25     Years: 10.00     Pack years: 2.50     Types: Cigarettes     Start date: 1/8/2004   • Smokeless tobacco: Former   • Tobacco comments:     occasionally    Substance and Sexual Activity   • Alcohol use: Yes     Alcohol/week: 10.0 standard drinks     Types: 10 Drinks containing 0.5 oz of alcohol per week     Comment: 8-10/week   • Drug use: No   • Sexual activity: Yes     Partners: Female     Birth control/protection: Vasectomy, Hysterectomy        Assessment/Plan:    Diagnoses and all orders for this visit:    1. Vitamin D deficiency (Primary)  Overview:  He has started vitamin D  50,000 units weekly.    Orders:  -     Vitamin D,25-Hydroxy; Future    2. Lipid screening  Overview:  Try and eat a low-fat, low-cholesterol diet.  Lightly elevated LDL cholesterol, however he has a very high HDL cholesterol of 126.  Continue to monitor.    Orders:  -     Lipid Panel; Future  -     MicroAlbumin, Urine, Random - Urine, Clean Catch; Future    3. Thyroid disorder screening  -     CBC & Differential; Future  -     Comprehensive Metabolic Panel; Future  -     TSH; Future  -     T4, Free; Future  -     T3, Free; Future    Will discontinue Wegovy due to limited weight loss.  Discussed importance of regular cardiovascular exercise and importance of cutting back on alcohol.  We will recheck cholesterol in the next week or 2.  Follow-up in 4 months for routine physical.    Patient Instructions   BMI for Adults  What is BMI?  Body mass index (BMI) is a number that is calculated from a person's weight and height. BMI can help estimate how much of a person's weight is composed of fat. BMI does not measure body fat directly. Rather, it is an alternative to procedures that directly measure body fat, which can be difficult and expensive.  BMI can help identify people who may be at higher risk for certain medical problems.  What are BMI measurements used for?  BMI is used as a screening tool to identify possible weight problems. It helps determine whether a person is obese, overweight, a healthy weight, or underweight.  BMI is useful for:  • Identifying a weight problem that may be related to a medical condition or may increase the risk for medical problems.  • Promoting changes, such as changes in diet and exercise, to help reach a healthy weight. BMI screening can be repeated to see if these changes are working.  How is BMI calculated?  BMI involves measuring your weight in relation to your height. Both height and weight are measured, and the BMI is calculated from those numbers. This can be done either in English  "(U[place] or metric measurements. Note that charts and online BMI calculators are available to help you find your BMI quickly and easily without having to do these calculations yourself.  To calculate your BMI in English (U.S[place] measurements:    1. Measure your weight in pounds (lb).  2. Multiply the number of pounds by 703.  ? For example, for a person who weighs 180 lb, multiply that number by 703, which equals 126,540.  3. Measure your height in inches. Then multiply that number by itself to get a measurement called \"inches squared.\"  ? For example, for a person who is 70 inches tall, the \"inches squared\" measurement is 70 inches x 70 inches, which equals 4,900 inches squared.  4. Divide the total from step 2 (number of lb x 703) by the total from step 3 (inches squared): 126,540 ÷ 4,900 = 25.8. This is your BMI.  To calculate your BMI in metric measurements:  1. Measure your weight in kilograms (kg).  2. Measure your height in meters (m). Then multiply that number by itself to get a measurement called \"meters squared.\"  ? For example, for a person who is 1.75 m tall, the \"meters squared\" measurement is 1.75 m x 1.75 m, which is equal to 3.1 meters squared.  3. Divide the number of kilograms (your weight) by the meters squared number. In this example: 70 ÷ 3.1 = 22.6. This is your BMI.  What do the results mean?  BMI charts are used to identify whether you are underweight, normal weight, overweight, or obese. The following guidelines will be used:  • Underweight: BMI less than 18.5.  • Normal weight: BMI between 18.5 and 24.9.  • Overweight: BMI between 25 and 29.9.  • Obese: BMI of 30 or above.  Keep these notes in mind:  • Weight includes both fat and muscle, so someone with a muscular build, such as an athlete, may have a BMI that is higher than 24.9. In cases like these, BMI is not an accurate measure of body fat.  • To determine if excess body fat is the cause of a BMI of 25 or higher, further assessments may " need to be done by a health care provider.  • BMI is usually interpreted in the same way for men and women.  Where to find more information  For more information about BMI, including tools to quickly calculate your BMI, go to these websites:  • Centers for Disease Control and Prevention: www.cdc.gov  • American Heart Association: www.heart.org  • National Heart, Lung, and Blood [place]: www.nhlbi.nih.gov  Summary  • Body mass index (BMI) is a number that is calculated from a person's weight and height.  • BMI may help estimate how much of a person's weight is composed of fat. BMI can help identify those who may be at higher risk for certain medical problems.  • BMI can be measured using English measurements or metric measurements.  • BMI charts are used to identify whether you are underweight, normal weight, overweight, or obese.  This information is not intended to replace advice given to you by your health care provider. Make sure you discuss any questions you have with your health care provider.  Document Revised: 09/09/2020 Document Reviewed: 07/17/2020  [employer] Patient Education © 2022 [employer]         Plan of care reviewed with patient at the conclusion of today's visit. Education was provided regarding diagnosis, management, and any prescribed or recommended OTC medications.Patient verbalizes understanding of and agreement with management plan.         I spent 20 minutes caring for Arie on this date of service. This time includes time spent by me in the following activities:preparing for the visit, reviewing tests, obtaining and/or reviewing a separately obtained history, performing a medically appropriate examination and/or evaluation , counseling and educating the patient/family/caregiver, ordering medications, tests, or procedures, referring and communicating with other health care professionals  and documenting information in the medical record    Rosemary Logn PA-C      Note: Part of this  note may be an electronic transcription/translation of spoken language to printed text using the Dragon Dictation system.

## 2023-08-11 ENCOUNTER — OFFICE VISIT (OUTPATIENT)
Dept: INTERNAL MEDICINE | Facility: CLINIC | Age: 43
End: 2023-08-11
Payer: COMMERCIAL

## 2023-08-11 ENCOUNTER — HOSPITAL ENCOUNTER (OUTPATIENT)
Dept: CT IMAGING | Facility: HOSPITAL | Age: 43
Discharge: HOME | End: 2023-08-11
Payer: COMMERCIAL

## 2023-08-11 VITALS
HEART RATE: 84 BPM | BODY MASS INDEX: 29.12 KG/M2 | WEIGHT: 215 LBS | DIASTOLIC BLOOD PRESSURE: 98 MMHG | HEIGHT: 72 IN | TEMPERATURE: 98.7 F | SYSTOLIC BLOOD PRESSURE: 126 MMHG

## 2023-08-11 DIAGNOSIS — K46.9 NON-RECURRENT ABDOMINAL HERNIA WITHOUT OBSTRUCTION OR GANGRENE, UNSPECIFIED HERNIA TYPE: Primary | ICD-10-CM

## 2023-08-11 DIAGNOSIS — K42.9 UMBILICAL HERNIA WITHOUT OBSTRUCTION AND WITHOUT GANGRENE: Primary | ICD-10-CM

## 2023-08-11 DIAGNOSIS — K46.9 NON-RECURRENT ABDOMINAL HERNIA WITHOUT OBSTRUCTION OR GANGRENE, UNSPECIFIED HERNIA TYPE: ICD-10-CM

## 2023-08-11 PROCEDURE — 25510000001 IOPAMIDOL 61 % SOLUTION

## 2023-08-11 PROCEDURE — 74170 CT ABD WO CNTRST FLWD CNTRST: CPT

## 2023-08-11 RX ADMIN — IOPAMIDOL 88 ML: 612 INJECTION, SOLUTION INTRAVENOUS at 17:06

## 2023-08-11 NOTE — PROGRESS NOTES
"    Acute Office Visit      Name: Arie Hayden    : 1980     MRN: [id number]   Care Team: Patient Care Team:  Rosemary Long PA-C as PCP - General (Internal Medicine)  Mitchel Beck MD as Consulting Physician (Colon and Rectal Surgery)    Chief Complaint  Mass (Knot/mass above belly button/)    Subjective     History of Present Illness:  Arie Hayden is a 43 y.o. male who presents today for a protrusion above his bellybutton.    Arie states that he recently bent over to pick something up off the ground, when he experienced a twinge in his back.  He later noted a protrusion above his bellybutton while in the shower that was not there before.  He denies this ever occurring in the past.  He denies pain or discomfort.  He does report some constipation earlier in the week followed by several episodes of diarrhea within the last 24 hours.  He has a history of an abscess of his sigmoid colon due to diverticulitis with a portion of his colon removed about 4 years ago.    Review of systems was completed, and pertinent findings are noted in the HPI.  Review of Systems   Gastrointestinal:  Positive for abdominal distention.   Musculoskeletal:  Positive for back pain.   All other systems reviewed and are negative.    Past Medical History:   Diagnosis Date    Allergic     Allergic rhinitis     Colon polyp     Diverticulitis 01/15/2019    Diverticulosis     Environmental allergies     Fatigue     Rosacea     Scoliosis     Sleep apnea     wears CPAP \"sometimes\"    Snores        Past Surgical History:   Procedure Laterality Date    APPENDECTOMY      COLON RESECTION N/A 2019    Procedure: LOWER ANTERIOR RESECTION, APPENDECTOMY, TAKEDOWN OF SPLENIC FLEXURE, EXCISION AND CLOSURE OF COLOCECAL FISTULA, UMBILICAL HERNIA REPAIR, AND PROCTOSCOPY;  Surgeon: Mitchel Beck MD;  Location: [facility];  Service: General    COLONOSCOPY      CT ABSCESS DRAIN CYST ASP      TONSILLECTOMY  1987    VASECTOMY  " "2011    WISDOM TOOTH EXTRACTION         Social History     Socioeconomic History    Marital status:    Tobacco Use    Smoking status: Light Smoker     Packs/day: 0.25     Years: 10.00     Pack years: 2.50     Types: Cigarettes     Start date: 1/8/2004    Smokeless tobacco: Former    Tobacco comments:     occasionally    Substance and Sexual Activity    Alcohol use: Yes     Alcohol/week: 10.0 standard drinks     Types: 10 Drinks containing 0.5 oz of alcohol per week     Comment: 8-10/week    Drug use: No    Sexual activity: Yes     Partners: Female     Birth control/protection: Vasectomy, Hysterectomy         Current Outpatient Medications:     loratadine (CLARITIN) 10 MG tablet, Take 1 tablet by mouth Daily As Needed., Disp: , Rfl:     Seysara 150 MG tablet, Take 1 tablet by mouth 3 (Three) Times a Week., Disp: , Rfl:     vitamin D (ERGOCALCIFEROL) 1.25 MG (74541 UT) capsule capsule, TAKE 1 CAPSULE BY MOUTH 1 (ONE) TIME PER WEEK., Disp: 4 capsule, Rfl: 3    Procedures    PHQ-9 Total Score:      Objective     Vital Signs  /98 (BP Location: Left arm, Patient Position: Sitting, Cuff Size: Adult)   Pulse 84   Temp 98.7 øF (37.1 øC) (Temporal)   Ht 182.9 cm (72.01\")   Wt 97.5 kg (215 lb)   BMI 29.15 kg/mý   Estimated body mass index is 29.15 kg/mý as calculated from the following:    Height as of this encounter: 182.9 cm (72.01\").    Weight as of this encounter: 97.5 kg (215 lb).            Physical Exam  Vitals and nursing note reviewed.   HENT:      Head: Normocephalic.   Cardiovascular:      Rate and Rhythm: Normal rate and regular rhythm.   Pulmonary:      Effort: Pulmonary effort is normal.      Breath sounds: Normal breath sounds.   Abdominal:      General: Abdomen is flat. Bowel sounds are normal.      Tenderness: There is no abdominal tenderness. There is no guarding.      Hernia: A hernia is present.   Skin:     General: Skin is warm and dry.   Neurological:      General: No focal deficit " present.      Mental Status: He is alert and oriented to person, place, and time.   Psychiatric:         Mood and Affect: Mood normal.         Behavior: Behavior normal.         Thought Content: Thought content normal.         Judgment: Judgment normal.        [facility]    Assessment and Plan     Assessment/Plan:  Diagnoses and all orders for this visit:    1. Non-recurrent abdominal hernia without obstruction or gangrene, unspecified hernia type (Primary)  Comments:  CT of the abdomen.  Orders:  -     CT Abdomen With & Without Contrast; Future  -Will obtain CT of the abdomen today due to significant history.  -Possible referral to general surgery.  Mr. Hayden will notify provider of preference.       There are no Patient Instructions on file for this visit.  Plan of care reviewed with patient at the conclusion of today's visit. Education was provided regarding diagnosis, management and any prescribed or recommended OTC medications.  Patient verbalizes understanding of and agreement with management plan.    Follow Up  Return for Next Scheduled Follow up.    Martha Jones, ADRIÁN

## 2023-09-26 ENCOUNTER — LAB (OUTPATIENT)
Dept: LAB | Facility: HOSPITAL | Age: 43
End: 2023-09-26
Payer: COMMERCIAL

## 2023-09-26 DIAGNOSIS — Z13.220 LIPID SCREENING: ICD-10-CM

## 2023-09-26 DIAGNOSIS — E55.9 VITAMIN D DEFICIENCY: ICD-10-CM

## 2023-09-26 DIAGNOSIS — Z13.29 THYROID DISORDER SCREENING: ICD-10-CM

## 2023-09-26 PROCEDURE — 84481 FREE ASSAY (FT-3): CPT

## 2023-09-26 PROCEDURE — 80050 GENERAL HEALTH PANEL: CPT

## 2023-09-26 PROCEDURE — 82306 VITAMIN D 25 HYDROXY: CPT

## 2023-09-26 PROCEDURE — 84439 ASSAY OF FREE THYROXINE: CPT

## 2023-09-26 PROCEDURE — 80061 LIPID PANEL: CPT

## 2023-09-26 PROCEDURE — 82043 UR ALBUMIN QUANTITATIVE: CPT

## 2023-09-27 LAB
25(OH)D3 SERPL-MCNC: 42.3 NG/ML (ref 30–100)
ALBUMIN SERPL-MCNC: 4.8 G/DL (ref 3.5–5.2)
ALBUMIN UR-MCNC: <1.2 MG/DL
ALBUMIN/GLOB SERPL: 1.9 G/DL
ALP SERPL-CCNC: 72 U/L (ref 39–117)
ALT SERPL W P-5'-P-CCNC: 55 U/L (ref 1–41)
ANION GAP SERPL CALCULATED.3IONS-SCNC: 11.2 MMOL/L (ref 5–15)
AST SERPL-CCNC: 37 U/L (ref 1–40)
BASOPHILS # BLD AUTO: 0.06 10*3/MM3 (ref 0–0.2)
BASOPHILS NFR BLD AUTO: 0.9 % (ref 0–1.5)
BILIRUB SERPL-MCNC: 1 MG/DL (ref 0–1.2)
BUN SERPL-MCNC: 10 MG/DL (ref 6–20)
BUN/CREAT SERPL: 11.1 (ref 7–25)
CALCIUM SPEC-SCNC: 9.7 MG/DL (ref 8.6–10.5)
CHLORIDE SERPL-SCNC: 100 MMOL/L (ref 98–107)
CHOLEST SERPL-MCNC: 220 MG/DL (ref 0–200)
CO2 SERPL-SCNC: 27.8 MMOL/L (ref 22–29)
CREAT SERPL-MCNC: 0.9 MG/DL (ref 0.76–1.27)
DEPRECATED RDW RBC AUTO: 42.6 FL (ref 37–54)
EGFRCR SERPLBLD CKD-EPI 2021: 108 ML/MIN/1.73
EOSINOPHIL # BLD AUTO: 0.09 10*3/MM3 (ref 0–0.4)
EOSINOPHIL NFR BLD AUTO: 1.4 % (ref 0.3–6.2)
ERYTHROCYTE [DISTWIDTH] IN BLOOD BY AUTOMATED COUNT: 12.2 % (ref 12.3–15.4)
GLOBULIN UR ELPH-MCNC: 2.5 GM/DL
GLUCOSE SERPL-MCNC: 86 MG/DL (ref 65–99)
HCT VFR BLD AUTO: 45.3 % (ref 37.5–51)
HDLC SERPL-MCNC: 111 MG/DL (ref 40–60)
HGB BLD-MCNC: 15.5 G/DL (ref 13–17.7)
IMM GRANULOCYTES # BLD AUTO: 0.02 10*3/MM3 (ref 0–0.05)
IMM GRANULOCYTES NFR BLD AUTO: 0.3 % (ref 0–0.5)
LDLC SERPL CALC-MCNC: 98 MG/DL (ref 0–100)
LDLC/HDLC SERPL: 0.87 {RATIO}
LYMPHOCYTES # BLD AUTO: 1.67 10*3/MM3 (ref 0.7–3.1)
LYMPHOCYTES NFR BLD AUTO: 25.4 % (ref 19.6–45.3)
MCH RBC QN AUTO: 32.5 PG (ref 26.6–33)
MCHC RBC AUTO-ENTMCNC: 34.2 G/DL (ref 31.5–35.7)
MCV RBC AUTO: 95 FL (ref 79–97)
MONOCYTES # BLD AUTO: 0.76 10*3/MM3 (ref 0.1–0.9)
MONOCYTES NFR BLD AUTO: 11.6 % (ref 5–12)
NEUTROPHILS NFR BLD AUTO: 3.97 10*3/MM3 (ref 1.7–7)
NEUTROPHILS NFR BLD AUTO: 60.4 % (ref 42.7–76)
NRBC BLD AUTO-RTO: 0 /100 WBC (ref 0–0.2)
PLATELET # BLD AUTO: 245 10*3/MM3 (ref 140–450)
PMV BLD AUTO: 10.4 FL (ref 6–12)
POTASSIUM SERPL-SCNC: 4.1 MMOL/L (ref 3.5–5.2)
PROT SERPL-MCNC: 7.3 G/DL (ref 6–8.5)
RBC # BLD AUTO: 4.77 10*6/MM3 (ref 4.14–5.8)
SODIUM SERPL-SCNC: 139 MMOL/L (ref 136–145)
T3FREE SERPL-MCNC: 4.14 PG/ML (ref 2–4.4)
T4 FREE SERPL-MCNC: 1.42 NG/DL (ref 0.93–1.7)
TRIGL SERPL-MCNC: 64 MG/DL (ref 0–150)
TSH SERPL DL<=0.05 MIU/L-ACNC: 5.06 UIU/ML (ref 0.27–4.2)
VLDLC SERPL-MCNC: 11 MG/DL (ref 5–40)
WBC NRBC COR # BLD: 6.57 10*3/MM3 (ref 3.4–10.8)

## 2023-10-12 ENCOUNTER — OFFICE VISIT (OUTPATIENT)
Dept: INTERNAL MEDICINE | Facility: CLINIC | Age: 43
End: 2023-10-12
Payer: COMMERCIAL

## 2023-10-12 VITALS
WEIGHT: 219 LBS | DIASTOLIC BLOOD PRESSURE: 80 MMHG | HEART RATE: 84 BPM | BODY MASS INDEX: 29.66 KG/M2 | HEIGHT: 72 IN | SYSTOLIC BLOOD PRESSURE: 116 MMHG

## 2023-10-12 DIAGNOSIS — E55.9 VITAMIN D DEFICIENCY: ICD-10-CM

## 2023-10-12 DIAGNOSIS — Z72.0 TOBACCO USE: ICD-10-CM

## 2023-10-12 DIAGNOSIS — Z00.00 ROUTINE MEDICAL EXAM: Primary | ICD-10-CM

## 2023-10-12 DIAGNOSIS — J30.9 ALLERGIC RHINITIS, UNSPECIFIED SEASONALITY, UNSPECIFIED TRIGGER: ICD-10-CM

## 2023-10-12 DIAGNOSIS — E03.9 HYPOTHYROIDISM, UNSPECIFIED TYPE: ICD-10-CM

## 2023-10-12 DIAGNOSIS — Z72.0 TOBACCO ABUSE: ICD-10-CM

## 2023-10-12 DIAGNOSIS — Z78.9 ALCOHOL USE: Chronic | ICD-10-CM

## 2023-10-12 DIAGNOSIS — K57.92 DIVERTICULITIS: ICD-10-CM

## 2023-10-12 RX ORDER — LEVOTHYROXINE SODIUM 0.03 MG/1
25 TABLET ORAL
Qty: 90 TABLET | Refills: 1 | Status: SHIPPED | OUTPATIENT
Start: 2023-10-12

## 2023-10-12 RX ORDER — ERGOCALCIFEROL 1.25 MG/1
50000 CAPSULE ORAL WEEKLY
Qty: 4 CAPSULE | Refills: 3 | Status: SHIPPED | OUTPATIENT
Start: 2023-10-12

## 2023-10-12 NOTE — PROGRESS NOTES
[facility] Internal Medicine    Arie Hayden  1980   [id number]      Patient Care Team:  Rosemary Long PA-C as PCP - General (Internal Medicine)  Mitchel Beck MD as Consulting Physician (Colon and Rectal Surgery)  Kendra Arteaga MD as Consulting Physician (Dermatology)    Chief Complaint::   Chief Complaint   Patient presents with    Annual Exam    hernia     With surgery scheduled for 12/4/23 by Dr. Graham        HPI  Arie is a 43-year-old male date of birth 1980 who presents today for routine annual exam.  Past medical history significant for hernia, allergic rhinitis, and history of diverticulitis.  Hernia surgery with Dr. Graham.  He has been restricted on exercise until his surgery. He has been told to stop smoking.  He was a half a pack a day, now 4 or 5 cigarettes a day.  Drinks 5 nights a week, 4 or 5 drinks. Vodka.  Current on colonoscopy.  History of diverticulitis.  He denies chest pain, shortness of breath, palpitations, or headaches.  Recent labs show elevated TSH.  We have continue to watch this for quite some time.  Improvement in vitamin D from 26-40.  Liver enzymes slightly elevated ALT at 55.      Patient Active Problem List   Diagnosis    Left lower quadrant pain    Sepsis     Tobacco abuse    Abscess of sigmoid colon due to diverticulitis    Allergic rhinitis    Fatigue    Snores    Diverticulitis    Sleep apnea    Rosacea    Alcohol use    Encounter for preventive health examination    Lipid screening    Screening PSA (prostate specific antigen)    Elevated blood pressure reading    Low back pain with right-sided sciatica    Thyroid disorder screening    Vitamin D deficiency    Encounter for vitamin deficiency screening    Overweight    Routine medical exam        Past Medical History:   Diagnosis Date    Allergic     Allergic rhinitis     Colon polyp     Diverticulitis 01/15/2019    Diverticulosis     Environmental allergies     Fatigue     Rosacea      "Scoliosis     Sleep apnea     wears CPAP \"sometimes\"    Snores        Past Surgical History:   Procedure Laterality Date    APPENDECTOMY      COLON RESECTION N/A 05/06/2019    Procedure: LOWER ANTERIOR RESECTION, APPENDECTOMY, TAKEDOWN OF SPLENIC FLEXURE, EXCISION AND CLOSURE OF COLOCECAL FISTULA, UMBILICAL HERNIA REPAIR, AND PROCTOSCOPY;  Surgeon: Mitchel Beck MD;  Location: [facility];  Service: General    COLONOSCOPY      CT ABSCESS DRAIN CYST ASP      TONSILLECTOMY  1987    VASECTOMY  2011    WISDOM TOOTH EXTRACTION         Family History   Problem Relation Age of Onset    Factor V Leiden deficiency Father     Suicidality Father     Factor IX deficiency Father     Breast cancer Maternal Grandmother     Lung cancer Maternal Grandfather     Factor V Leiden deficiency Paternal Grandfather     Prostate cancer Paternal Grandfather     Hyperlipidemia Mother     Hypothyroidism Mother     No Known Problems Sister     No Known Problems Brother     No Known Problems Daughter     No Known Problems Daughter     Hyperlipidemia Maternal Uncle        Social History     Socioeconomic History    Marital status:    Tobacco Use    Smoking status: Light Smoker     Packs/day: 0.25     Years: 10.00     Additional pack years: 0.00     Total pack years: 2.50     Types: Cigarettes     Start date: 1/8/2004    Smokeless tobacco: Former    Tobacco comments:     occasionally    Substance and Sexual Activity    Alcohol use: Yes     Alcohol/week: 10.0 standard drinks of alcohol     Types: 10 Drinks containing 0.5 oz of alcohol per week     Comment: 8-10/week    Drug use: No    Sexual activity: Yes     Partners: Female     Birth control/protection: Vasectomy, Hysterectomy       Allergies   Allergen Reactions    Pollen Extract Other (See Comments)     Sinus related       Review of Systems   Constitutional:  Negative for activity change, appetite change, diaphoresis, fatigue, unexpected weight gain and unexpected weight loss.   HENT:  " "Negative for hearing loss and trouble swallowing.    Eyes:  Negative for visual disturbance.   Respiratory:  Negative for chest tightness and shortness of breath.    Cardiovascular:  Negative for chest pain, palpitations and leg swelling.   Gastrointestinal:  Positive for constipation. Negative for abdominal pain, blood in stool, GERD and indigestion.   Endocrine: Negative for cold intolerance and heat intolerance.   Genitourinary:  Negative for dysuria and hematuria.   Musculoskeletal:  Negative for arthralgias and myalgias.   Skin:  Negative for skin lesions.   Neurological:  Negative for tremors, seizures, syncope, speech difficulty, weakness, headache, memory problem and confusion.   Hematological:  Does not bruise/bleed easily.   Psychiatric/Behavioral:  Negative for sleep disturbance and depressed mood. The patient is not nervous/anxious.         Vital Signs  Vitals:    10/12/23 0919   BP: 116/80   BP Location: Left arm   Patient Position: Sitting   Cuff Size: Adult   Pulse: 84   Weight: 99.3 kg (219 lb)   Height: 182.9 cm (72\")   PainSc: 0-No pain     Body mass index is 29.7 kg/mý.        Advance Care Planning   ACP discussion was held with the patient during this visit. Patient does not have an advance directive, information provided.       Current Outpatient Medications:     loratadine (CLARITIN) 10 MG tablet, Take 1 tablet by mouth Daily As Needed., Disp: , Rfl:     Seysara 150 MG tablet, Take 1 tablet by mouth 3 (Three) Times a Week., Disp: , Rfl:     vitamin D (ERGOCALCIFEROL) 1.25 MG (48305 UT) capsule capsule, Take 1 capsule by mouth 1 (One) Time Per Week., Disp: 4 capsule, Rfl: 3    levothyroxine (SYNTHROID, LEVOTHROID) 25 MCG tablet, Take 1 tablet by mouth Every Morning., Disp: 90 tablet, Rfl: 1    Physical Exam  Vitals reviewed.   Constitutional:       Appearance: Normal appearance. He is well-developed.   HENT:      Head: Normocephalic and atraumatic.      Right Ear: Hearing, tympanic membrane, ear " canal and external ear normal.      Left Ear: Hearing, tympanic membrane, ear canal and external ear normal.      Nose: Nose normal.      Mouth/Throat:      Mouth: Mucous membranes are moist.      Pharynx: Oropharynx is clear. Uvula midline.   Eyes:      General: Lids are normal.      Conjunctiva/sclera: Conjunctivae normal.      Pupils: Pupils are equal, round, and reactive to light.   Cardiovascular:      Rate and Rhythm: Normal rate and regular rhythm.      Heart sounds: Normal heart sounds.   Pulmonary:      Effort: Pulmonary effort is normal.      Breath sounds: Normal breath sounds.   Abdominal:      General: Bowel sounds are normal.      Palpations: Abdomen is soft.   Musculoskeletal:         General: Normal range of motion.      Cervical back: Full passive range of motion without pain, normal range of motion and neck supple.   Skin:     General: Skin is warm and dry.   Neurological:      Mental Status: He is alert and oriented to person, place, and time.      Deep Tendon Reflexes: Reflexes are normal and symmetric.   Psychiatric:         Speech: Speech normal.         Behavior: Behavior normal.         Thought Content: Thought content normal.         Judgment: Judgment normal.          ACE III MINI            Results Review:    Recent Results (from the past 672 hour(s))   Comprehensive Metabolic Panel    Collection Time: 09/26/23 11:46 AM    Specimen: Blood   Result Value Ref Range    Glucose 86 65 - 99 mg/dL    BUN 10 6 - 20 mg/dL    Creatinine 0.90 0.76 - 1.27 mg/dL    Sodium 139 136 - 145 mmol/L    Potassium 4.1 3.5 - 5.2 mmol/L    Chloride 100 98 - 107 mmol/L    CO2 27.8 22.0 - 29.0 mmol/L    Calcium 9.7 8.6 - 10.5 mg/dL    Total Protein 7.3 6.0 - 8.5 g/dL    Albumin 4.8 3.5 - 5.2 g/dL    ALT (SGPT) 55 (H) 1 - 41 U/L    AST (SGOT) 37 1 - 40 U/L    Alkaline Phosphatase 72 39 - 117 U/L    Total Bilirubin 1.0 0.0 - 1.2 mg/dL    Globulin 2.5 gm/dL    A/G Ratio 1.9 g/dL    BUN/Creatinine Ratio 11.1 7.0 - 25.0     Anion Gap 11.2 5.0 - 15.0 mmol/L    eGFR 108.0 >60.0 mL/min/1.73   Lipid Panel    Collection Time: 09/26/23 11:46 AM    Specimen: Blood   Result Value Ref Range    Total Cholesterol 220 (H) 0 - 200 mg/dL    Triglycerides 64 0 - 150 mg/dL    HDL Cholesterol 111 (H) 40 - 60 mg/dL    LDL Cholesterol  98 0 - 100 mg/dL    VLDL Cholesterol 11 5 - 40 mg/dL    LDL/HDL Ratio 0.87    MicroAlbumin, Urine, Random - Urine, Clean Catch    Collection Time: 09/26/23 11:46 AM    Specimen: Urine, Clean Catch   Result Value Ref Range    Microalbumin, Urine <1.2 mg/dL   TSH    Collection Time: 09/26/23 11:46 AM    Specimen: Blood   Result Value Ref Range    TSH 5.060 (H) 0.270 - 4.200 uIU/mL   T4, Free    Collection Time: 09/26/23 11:46 AM    Specimen: Blood   Result Value Ref Range    Free T4 1.42 0.93 - 1.70 ng/dL   T3, Free    Collection Time: 09/26/23 11:46 AM    Specimen: Blood   Result Value Ref Range    T3, Free 4.14 2.00 - 4.40 pg/mL   Vitamin D,25-Hydroxy    Collection Time: 09/26/23 11:46 AM    Specimen: Blood   Result Value Ref Range    25 Hydroxy, Vitamin D 42.3 30.0 - 100.0 ng/ml   CBC Auto Differential    Collection Time: 09/26/23 11:46 AM    Specimen: Blood   Result Value Ref Range    WBC 6.57 3.40 - 10.80 10*3/mm3    RBC 4.77 4.14 - 5.80 10*6/mm3    Hemoglobin 15.5 13.0 - 17.7 g/dL    Hematocrit 45.3 37.5 - 51.0 %    MCV 95.0 79.0 - 97.0 fL    MCH 32.5 26.6 - 33.0 pg    MCHC 34.2 31.5 - 35.7 g/dL    RDW 12.2 (L) 12.3 - 15.4 %    RDW-SD 42.6 37.0 - 54.0 fl    MPV 10.4 6.0 - 12.0 fL    Platelets 245 140 - 450 10*3/mm3    Neutrophil % 60.4 42.7 - 76.0 %    Lymphocyte % 25.4 19.6 - 45.3 %    Monocyte % 11.6 5.0 - 12.0 %    Eosinophil % 1.4 0.3 - 6.2 %    Basophil % 0.9 0.0 - 1.5 %    Immature Grans % 0.3 0.0 - 0.5 %    Neutrophils, Absolute 3.97 1.70 - 7.00 10*3/mm3    Lymphocytes, Absolute 1.67 0.70 - 3.10 10*3/mm3    Monocytes, Absolute 0.76 0.10 - 0.90 10*3/mm3    Eosinophils, Absolute 0.09 0.00 - 0.40 10*3/mm3     Basophils, Absolute 0.06 0.00 - 0.20 10*3/mm3    Immature Grans, Absolute 0.02 0.00 - 0.05 10*3/mm3    nRBC 0.0 0.0 - 0.2 /100 WBC     Procedures    Medication Review: Medications reviewed and noted    Social History     Socioeconomic History    Marital status:    Tobacco Use    Smoking status: Light Smoker     Packs/day: 0.25     Years: 10.00     Additional pack years: 0.00     Total pack years: 2.50     Types: Cigarettes     Start date: 1/8/2004    Smokeless tobacco: Former    Tobacco comments:     occasionally    Substance and Sexual Activity    Alcohol use: Yes     Alcohol/week: 10.0 standard drinks of alcohol     Types: 10 Drinks containing 0.5 oz of alcohol per week     Comment: 8-10/week    Drug use: No    Sexual activity: Yes     Partners: Female     Birth control/protection: Vasectomy, Hysterectomy        Assessment/Plan:    Diagnoses and all orders for this visit:    1. Routine medical exam (Primary)  Overview:  Fasting labs reviewed from 9/26/2023.  He will continue vitamin D for now and will start levothyroxine 25 mcg daily.  Try and cut back on alcohol use and smoking.  Try to increase exercise, particularly walking and elliptical up to 4 to 5 days a week.  Flu and pneumonia today.  He is current on colonoscopy.      2. Vitamin D deficiency  Overview:  Continue vitamin D 50,000 units weekly.    Orders:  -     vitamin D (ERGOCALCIFEROL) 1.25 MG (34004 UT) capsule capsule; Take 1 capsule by mouth 1 (One) Time Per Week.  Dispense: 4 capsule; Refill: 3    3. Hypothyroidism, unspecified type  -     levothyroxine (SYNTHROID, LEVOTHROID) 25 MCG tablet; Take 1 tablet by mouth Every Morning.  Dispense: 90 tablet; Refill: 1    4. Tobacco use    5. Allergic rhinitis, unspecified seasonality, unspecified trigger  Overview:  Chronic allergic rhinitis, unspecified seasonality, unspecified trigger.  Continue loratadine 10 mg tablets daily.      6. Diverticulitis  Overview:  History of abscess due to  diverticulitis.  Has completed colonoscopy with Dr. Beck.        7. Alcohol use  Overview:  Reports drinking 4-5 drinks 5 nights a week.  No hx of w/d or seizures.  He is trying to cut back.      8. Tobacco abuse  Overview:  Reports smoking half a pack per day.  Now down to at the most 5 to 6 cigarettes a day.  Trying to stop prior to surgery.      Other orders  -     Fluzone (or Fluarix & Flulaval for VFC) >6mos  -     Pneumococcal Conjugate Vaccine 20-Valent All         Patient Instructions   Steps to Quit Smoking  Smoking tobacco is the leading cause of preventable death. It can affect almost every organ in the body. Smoking puts you and those around you at risk for developing many serious chronic diseases.  Quitting smoking can be very challenging. Do not get discouraged if you are not successful the first time. Some people need to make many attempts to quit before they achieve long-term success. Do your best to stick to your quit plan, and talk with your health care provider if you have any questions or concerns.  How do I get ready to quit?  When you decide to quit smoking, create a plan to help you succeed. Before you quit:  Pick a date to quit. Set a date within the next 2 weeks to give you time to prepare.  Write down the reasons why you are quitting. Keep this list in places where you will see it often.  Tell your family, friends, and co-workers that you are quitting. Support from people you are close to can make quitting easier.  Talk with your health care provider about your options for quitting smoking.  Find out what treatment options are covered by your health insurance.  Identify people, places, things, and activities that make you want to smoke (triggers). Avoid them.  What first steps can I take to quit smoking?  Throw away all cigarettes at home, at work, and in your car.  Throw away smoking accessories, such as ashtrays and lighters.  Clean your car. Make sure to empty the ashtray.  Clean your  home, including curtains and carpets.  What strategies can I use to quit smoking?  Talk with your health care provider about combining strategies, such as taking medicines while you are also receiving in-person counseling. Using these two strategies together makes you more likely to succeed in quitting than if you used either strategy on its own.  If you are pregnant or breastfeeding, talk with your health care provider about finding counseling or other support strategies to quit smoking. Do not take medicine to help you quit smoking unless your health care provider tells you to.  Quit right away  Quit smoking completely, instead of gradually reducing how much you smoke over a period of time. Stopping smoking right away may be more successful than gradually quitting.  Attend in-person counseling to help you build problem-solving skills. You are more likely to succeed in quitting if you attend counseling sessions regularly. Even short sessions of 10 minutes can be effective.  Take medicine  You may take medicines to help you quit smoking. Some medicines require a prescription. You can also purchase over-the-counter medicines. Medicines may have nicotine in them to replace the nicotine in cigarettes. Medicines may:  Help to stop cravings.  Help to relieve withdrawal symptoms.  Your health care provider may recommend:  Nicotine patches, gum, or lozenges.  Nicotine inhalers or sprays.  Non-nicotine medicine that you take by mouth.  Find resources  Find resources and support systems that can help you quit smoking and remain smoke-free after you quit. These resources are most helpful when you use them often. They include:  Online chats with a counselor.  Telephone quitlines.  Printed self-help materials.  Support groups or group counseling.  Text messaging programs.  Mobile phone apps or applications. Use apps that can help you stick to your quit plan by providing reminders, tips, and encouragement. Examples of free  services include Quit Guide from the CDC and smokefree.gov    What can I do to make it easier to quit?    Reach out to your family and friends for support and encouragement. Call telephone quitlines, such as 6-800-QUIT-NOW, reach out to support groups, or work with a counselor for support.  Ask people who smoke to avoid smoking around you.  Avoid places that trigger you to smoke, such as bars, parties, or smoke-break areas at work.  Spend time with people who do not smoke.  Lessen the stress in your life. Stress can be a smoking trigger for some people. To lessen stress, try:  Exercising regularly.  Doing deep-breathing exercises.  Doing yoga.  Meditating.  What benefits will I see if I quit smoking?  Over time, you should start to see positive results, such as:  Improved sense of smell and taste.  Decreased coughing and sore throat.  Slower heart rate.  Lower blood pressure.  Clearer and healthier skin.  The ability to breathe more easily.  Fewer sick days.  Summary  Quitting smoking can be very challenging. Do not get discouraged if you are not successful the first time. Some people need to make many attempts to quit before they achieve long-term success.  When you decide to quit smoking, create a plan to help you succeed.  Quit smoking right away, not slowly over a period of time.  Find resources and support systems that can help you quit smoking and remain smoke-free after you quit.  This information is not intended to replace advice given to you by your health care provider. Make sure you discuss any questions you have with your health care provider.  Document Revised: 12/09/2022 Document Reviewed: 12/09/2022  [employer] Patient Education c 2023 [employer]        Plan of care reviewed with patient at the conclusion of today's visit. Education was provided regarding diagnosis, management, and any prescribed or recommended OTC medications.Patient verbalizes understanding of and agreement with management plan.          I have seen Arie on this date of service. This time includes time spent by me in the following activities:preparing for the visit, reviewing tests, obtaining and/or reviewing a separately obtained history, performing a medically appropriate examination and/or evaluation , counseling and educating the patient/family/caregiver, ordering medications, tests, or procedures, referring and communicating with other health care professionals , and documenting information in the medical record    Rosemary Long PA-C      Note: Part of this note may be an electronic transcription/translation of spoken language to printed text using the Dragon Dictation system.

## 2023-10-12 NOTE — PATIENT INSTRUCTIONS
Steps to Quit Smoking  Smoking tobacco is the leading cause of preventable death. It can affect almost every organ in the body. Smoking puts you and those around you at risk for developing many serious chronic diseases.  Quitting smoking can be very challenging. Do not get discouraged if you are not successful the first time. Some people need to make many attempts to quit before they achieve long-term success. Do your best to stick to your quit plan, and talk with your health care provider if you have any questions or concerns.  How do I get ready to quit?  When you decide to quit smoking, create a plan to help you succeed. Before you quit:  Pick a date to quit. Set a date within the next 2 weeks to give you time to prepare.  Write down the reasons why you are quitting. Keep this list in places where you will see it often.  Tell your family, friends, and co-workers that you are quitting. Support from people you are close to can make quitting easier.  Talk with your health care provider about your options for quitting smoking.  Find out what treatment options are covered by your health insurance.  Identify people, places, things, and activities that make you want to smoke (triggers). Avoid them.  What first steps can I take to quit smoking?  Throw away all cigarettes at home, at work, and in your car.  Throw away smoking accessories, such as ashtrays and lighters.  Clean your car. Make sure to empty the ashtray.  Clean your home, including curtains and carpets.  What strategies can I use to quit smoking?  Talk with your health care provider about combining strategies, such as taking medicines while you are also receiving in-person counseling. Using these two strategies together makes you more likely to succeed in quitting than if you used either strategy on its own.  If you are pregnant or breastfeeding, talk with your health care provider about finding counseling or other support strategies to quit smoking. Do not  take medicine to help you quit smoking unless your health care provider tells you to.  Quit right away  Quit smoking completely, instead of gradually reducing how much you smoke over a period of time. Stopping smoking right away may be more successful than gradually quitting.  Attend in-person counseling to help you build problem-solving skills. You are more likely to succeed in quitting if you attend counseling sessions regularly. Even short sessions of 10 minutes can be effective.  Take medicine  You may take medicines to help you quit smoking. Some medicines require a prescription. You can also purchase over-the-counter medicines. Medicines may have nicotine in them to replace the nicotine in cigarettes. Medicines may:  Help to stop cravings.  Help to relieve withdrawal symptoms.  Your health care provider may recommend:  Nicotine patches, gum, or lozenges.  Nicotine inhalers or sprays.  Non-nicotine medicine that you take by mouth.  Find resources  Find resources and support systems that can help you quit smoking and remain smoke-free after you quit. These resources are most helpful when you use them often. They include:  Online chats with a counselor.  Telephone quitlines.  Printed self-help materials.  Support groups or group counseling.  Text messaging programs.  Mobile phone apps or applications. Use apps that can help you stick to your quit plan by providing reminders, tips, and encouragement. Examples of free services include Quit Guide from the CDC and smokefree.gov    What can I do to make it easier to quit?    Reach out to your family and friends for support and encouragement. Call telephone quitlines, such as 1800-QUIT-NOW, reach out to support groups, or work with a counselor for support.  Ask people who smoke to avoid smoking around you.  Avoid places that trigger you to smoke, such as bars, parties, or smoke-break areas at work.  Spend time with people who do not smoke.  Lessen the stress in your  life. Stress can be a smoking trigger for some people. To lessen stress, try:  Exercising regularly.  Doing deep-breathing exercises.  Doing yoga.  Meditating.  What benefits will I see if I quit smoking?  Over time, you should start to see positive results, such as:  Improved sense of smell and taste.  Decreased coughing and sore throat.  Slower heart rate.  Lower blood pressure.  Clearer and healthier skin.  The ability to breathe more easily.  Fewer sick days.  Summary  Quitting smoking can be very challenging. Do not get discouraged if you are not successful the first time. Some people need to make many attempts to quit before they achieve long-term success.  When you decide to quit smoking, create a plan to help you succeed.  Quit smoking right away, not slowly over a period of time.  Find resources and support systems that can help you quit smoking and remain smoke-free after you quit.  This information is not intended to replace advice given to you by your health care provider. Make sure you discuss any questions you have with your health care provider.  Document Revised: 12/09/2022 Document Reviewed: 12/09/2022  [employer] Patient Education c 2023 [employer]

## 2023-10-12 NOTE — LETTER
"[facility]  Vaccine Consent Form    Patient Name:  Arie Hayden  Patient :  1980    MRN:  [id number]     Vaccine(s) Ordered    Pneumococcal Conjugate Vaccine 20-Valent All  Fluzone (or Fluarix & Flulaval for VFC) >6mos        Screening Checklist  The following questions should be completed prior to vaccination. If you answer "yes" to any question, it does not necessarily mean you should not be vaccinated. It just means we may need to clarify or ask more questions. If a question is unclear, please ask your healthcare provider to explain it.    Yes No   Any fever or moderate to severe illness today (mild illness and/or antibiotic treatment are not contraindications)?     Do you have a history of a serious reaction to any previous vaccinations, such as anaphylaxis, encephalopathy within 7 days, Guillain-[place] syndrome within 6 weeks, seizure?     Have you received any live vaccine(s) in the past month (MMR, DEVORA)?     Do you have an anaphylactic allergy to latex (DTaP, DTaP-IPV, Hep A, Hep B, MenB, RV, Td, Tdap), baker's yeast (Hep B, HPV), or gelatin (DEVORA, MMR)?     Do you have an anaphylactic allergy to neomycin (Rabies, DEVORA, MMR, IPV, Hep A), polymyxin B (IPV), or streptomycin (IPV)?      Any cancer, leukemia, AIDS, or other immune system disorder? (DEVORA, MMR, RV)     Do you have a parent, brother, or sister with an immune system problem (if immune competence of vaccine recipient clinically verified, can proceed)? (MMR, DEVORA)     Any recent steroid treatments for >2 weeks, chemotherapy, or radiation treatment? (DEVORA, MMR)     Have you received antibody-containing blood transfusions or IVIG in the past 11 months (recommended interval is dependent on product)? (MMR, DEVORA)     Have you taken antiviral drugs (acyclovir, famciclovir, valacyclovir) in the last 24 or 48 hours, respectively (DEVORA)?      Are you pregnant or planning to become pregnant within 1 month? (DEVORA, MMR, HPV, IPV, MenB; For hep B- refer " "to Engerix-B)     For infants, have you ever been told your child has had intussusception or a medical emergency involving obstruction of the intestine (RV)? If not for an infant, can skip this question.         *Ordering Physician/APC should be consulted if "yes" is checked by the patient or guardian above.      I have received, read, and understand the Vaccine Information Statement (VIS) for each vaccine ordered above.  I have considered my health status as well as the health status of my close contacts.  I have taken the opportunity to discuss my vaccine questions with my health care provider.   I have requested that the ordered vaccine(s) be given to me.  I understand the benefits and risks of the vaccines.  I understand that I should remain in the clinic for 15 minutes after receiving the vaccine(s).  _________________________________________________________  Signature of Patient or Parent/Legal Guardian ____________________  Date     "

## 2023-11-28 ENCOUNTER — PRE-ADMISSION TESTING (OUTPATIENT)
Dept: PREADMISSION TESTING | Facility: HOSPITAL | Age: 43
End: 2023-11-28
Payer: COMMERCIAL

## 2023-11-28 VITALS — HEIGHT: 72 IN | BODY MASS INDEX: 30.01 KG/M2 | WEIGHT: 221.56 LBS

## 2023-11-28 LAB
ALBUMIN SERPL-MCNC: 4.3 G/DL (ref 3.5–5.2)
ALBUMIN/GLOB SERPL: 1.4 G/DL
ALP SERPL-CCNC: 71 U/L (ref 39–117)
ALT SERPL W P-5'-P-CCNC: 94 U/L (ref 1–41)
ANION GAP SERPL CALCULATED.3IONS-SCNC: 11 MMOL/L (ref 5–15)
AST SERPL-CCNC: 51 U/L (ref 1–40)
BILIRUB SERPL-MCNC: 0.7 MG/DL (ref 0–1.2)
BUN SERPL-MCNC: 9 MG/DL (ref 6–20)
BUN/CREAT SERPL: 11.5 (ref 7–25)
CALCIUM SPEC-SCNC: 9.1 MG/DL (ref 8.6–10.5)
CHLORIDE SERPL-SCNC: 103 MMOL/L (ref 98–107)
CO2 SERPL-SCNC: 25 MMOL/L (ref 22–29)
CREAT SERPL-MCNC: 0.78 MG/DL (ref 0.76–1.27)
DEPRECATED RDW RBC AUTO: 44.5 FL (ref 37–54)
EGFRCR SERPLBLD CKD-EPI 2021: 112.8 ML/MIN/1.73
ERYTHROCYTE [DISTWIDTH] IN BLOOD BY AUTOMATED COUNT: 12.1 % (ref 12.3–15.4)
GLOBULIN UR ELPH-MCNC: 3.1 GM/DL
GLUCOSE SERPL-MCNC: 92 MG/DL (ref 65–99)
HBA1C MFR BLD: 5.2 % (ref 4.8–5.6)
HCT VFR BLD AUTO: 46.6 % (ref 37.5–51)
HGB BLD-MCNC: 15.8 G/DL (ref 13–17.7)
MCH RBC QN AUTO: 33.3 PG (ref 26.6–33)
MCHC RBC AUTO-ENTMCNC: 33.9 G/DL (ref 31.5–35.7)
MCV RBC AUTO: 98.1 FL (ref 79–97)
PLATELET # BLD AUTO: 270 10*3/MM3 (ref 140–450)
PMV BLD AUTO: 10.2 FL (ref 6–12)
POTASSIUM SERPL-SCNC: 4.2 MMOL/L (ref 3.5–5.2)
PROT SERPL-MCNC: 7.4 G/DL (ref 6–8.5)
RBC # BLD AUTO: 4.75 10*6/MM3 (ref 4.14–5.8)
SODIUM SERPL-SCNC: 139 MMOL/L (ref 136–145)
WBC NRBC COR # BLD AUTO: 6.92 10*3/MM3 (ref 3.4–10.8)

## 2023-11-28 PROCEDURE — 85027 COMPLETE CBC AUTOMATED: CPT

## 2023-11-28 PROCEDURE — 80053 COMPREHEN METABOLIC PANEL: CPT

## 2023-11-28 PROCEDURE — 83036 HEMOGLOBIN GLYCOSYLATED A1C: CPT

## 2023-11-28 PROCEDURE — 93005 ELECTROCARDIOGRAM TRACING: CPT

## 2023-11-28 PROCEDURE — 36415 COLL VENOUS BLD VENIPUNCTURE: CPT

## 2023-11-28 NOTE — PAT
Patient viewed general PAT education video as instructed in their preoperative information received from their surgeon.  Patient stated the general PAT education video was viewed in its entirety and survey completed.  Copies of [facility] general education handouts (Incentive Spirometry, Meds to Beds Program, Patient Belongings, Pre-op skin preparation instructions, Blood Glucose testing, Visitor policy, Surgery FAQ, Code H) distributed to patient if not printed. Education related to the PAT pass and skin preparation for surgery (if applicable) completed in PAT as a reinforcement to PAT education video. Patient instructed to return PAT pass provided today as well as completed skin preparation sheet (if applicable) on the day of procedure.     Additionally if patient had not viewed video yet but intended to view it at home or in our waiting area, then referred them to the handout with QR code/link provided during PAT visit.  Instructed patient to complete survey after viewing the video in its entirety.  Encouraged patient/family to read [facility] general education handouts thoroughly and notify PAT staff with any questions or concerns. Patient verbalized understanding of all information and priority content.    An arrival time for procedure was not provided during PAT visit. If patient had any questions or concerns about their arrival time, they were instructed to contact their surgeon/physician.  Additionally, if the patient referred to an arrival time that was acquired from their my chart account, patient was encouraged to verify that time with their surgeon/physician. Arrival times are NOT provided in Pre Admission Testing Department.    Patient denies any current skin issues.     Patient to apply Chlorhexadine wipes  to surgical area (as instructed) the night before procedure and the AM of procedure. Wipes provided.    Patient instructed to bring CPAP mask and tubing to the hospital for overnight stay.  Explained that it is  not necessary to bring their CPAP machine to the hospital instead a CPAP machine will be provided for use by the hospital. If patient knows their CPAP settings, those settings will be implemented.  If not, the CPAP machine will be utilized on the auto setting using their mask and tubing.    Patient verbalized understanding.

## 2023-11-29 LAB
QT INTERVAL: 370 MS
QTC INTERVAL: 432 MS

## 2023-12-04 ENCOUNTER — ANESTHESIA EVENT (OUTPATIENT)
Dept: PERIOP | Facility: HOSPITAL | Age: 43
End: 2023-12-04
Payer: COMMERCIAL

## 2023-12-04 ENCOUNTER — ANESTHESIA (OUTPATIENT)
Dept: PERIOP | Facility: HOSPITAL | Age: 43
End: 2023-12-04
Payer: COMMERCIAL

## 2023-12-04 ENCOUNTER — ANESTHESIA EVENT CONVERTED (OUTPATIENT)
Dept: ANESTHESIOLOGY | Facility: HOSPITAL | Age: 43
End: 2023-12-04
Payer: COMMERCIAL

## 2023-12-04 ENCOUNTER — HOSPITAL ENCOUNTER (OUTPATIENT)
Facility: HOSPITAL | Age: 43
Discharge: HOME | End: 2023-12-05
Attending: SURGERY | Admitting: SURGERY
Payer: COMMERCIAL

## 2023-12-04 DIAGNOSIS — K43.2 INCISIONAL HERNIA WITHOUT OBSTRUCTION OR GANGRENE: Primary | ICD-10-CM

## 2023-12-04 DIAGNOSIS — K43.2 INCISIONAL HERNIA: ICD-10-CM

## 2023-12-04 PROCEDURE — C1781 MESH (IMPLANTABLE): HCPCS | Performed by: SURGERY

## 2023-12-04 PROCEDURE — 25810000003 LACTATED RINGERS PER 1000 ML: Performed by: ANESTHESIOLOGY

## 2023-12-04 PROCEDURE — 25010000002 CEFAZOLIN PER 500 MG: Performed by: SURGERY

## 2023-12-04 PROCEDURE — 25810000003 LACTATED RINGERS PER 1000 ML: Performed by: SURGERY

## 2023-12-04 PROCEDURE — 25010000002 SUGAMMADEX 200 MG/2ML SOLUTION: Performed by: NURSE ANESTHETIST, CERTIFIED REGISTERED

## 2023-12-04 PROCEDURE — 25010000002 KETOROLAC TROMETHAMINE PER 15 MG: Performed by: NURSE ANESTHETIST, CERTIFIED REGISTERED

## 2023-12-04 PROCEDURE — 25010000002 PROPOFOL 10 MG/ML EMULSION: Performed by: NURSE ANESTHETIST, CERTIFIED REGISTERED

## 2023-12-04 PROCEDURE — 25010000002 ONDANSETRON PER 1 MG: Performed by: NURSE ANESTHETIST, CERTIFIED REGISTERED

## 2023-12-04 PROCEDURE — G0378 HOSPITAL OBSERVATION PER HR: HCPCS

## 2023-12-04 PROCEDURE — 25010000002 DEXAMETHASONE SODIUM PHOSPHATE 10 MG/ML SOLUTION: Performed by: NURSE ANESTHETIST, CERTIFIED REGISTERED

## 2023-12-04 PROCEDURE — 88302 TISSUE EXAM BY PATHOLOGIST: CPT | Performed by: SURGERY

## 2023-12-04 DEVICE — BARD MESH
Type: IMPLANTABLE DEVICE | Site: ABDOMEN | Status: FUNCTIONAL
Brand: BARD MESH

## 2023-12-04 RX ORDER — LIDOCAINE HYDROCHLORIDE 10 MG/ML
INJECTION, SOLUTION EPIDURAL; INFILTRATION; INTRACAUDAL; PERINEURAL AS NEEDED
Status: DISCONTINUED | OUTPATIENT
Start: 2023-12-04 | End: 2023-12-04 | Stop reason: SURG

## 2023-12-04 RX ORDER — SODIUM CHLORIDE 9 MG/ML
40 INJECTION, SOLUTION INTRAVENOUS AS NEEDED
Status: DISCONTINUED | OUTPATIENT
Start: 2023-12-04 | End: 2023-12-04

## 2023-12-04 RX ORDER — HYDROMORPHONE HYDROCHLORIDE 1 MG/ML
0.5 INJECTION, SOLUTION INTRAMUSCULAR; INTRAVENOUS; SUBCUTANEOUS
Status: DISCONTINUED | OUTPATIENT
Start: 2023-12-04 | End: 2023-12-04 | Stop reason: HOSPADM

## 2023-12-04 RX ORDER — LIDOCAINE HYDROCHLORIDE 10 MG/ML
0.5 INJECTION, SOLUTION EPIDURAL; INFILTRATION; INTRACAUDAL; PERINEURAL ONCE
Status: COMPLETED | OUTPATIENT
Start: 2023-12-04 | End: 2023-12-04

## 2023-12-04 RX ORDER — FAMOTIDINE 20 MG/1
20 TABLET, FILM COATED ORAL ONCE
Status: DISCONTINUED | OUTPATIENT
Start: 2023-12-04 | End: 2023-12-04 | Stop reason: SDUPTHER

## 2023-12-04 RX ORDER — ONDANSETRON 2 MG/ML
4 INJECTION INTRAMUSCULAR; INTRAVENOUS EVERY 6 HOURS PRN
Status: DISCONTINUED | OUTPATIENT
Start: 2023-12-04 | End: 2023-12-05 | Stop reason: HOSPADM

## 2023-12-04 RX ORDER — MELOXICAM 7.5 MG/1
7.5 TABLET ORAL DAILY
Status: DISCONTINUED | OUTPATIENT
Start: 2023-12-05 | End: 2023-12-05 | Stop reason: HOSPADM

## 2023-12-04 RX ORDER — ROCURONIUM BROMIDE 10 MG/ML
INJECTION, SOLUTION INTRAVENOUS AS NEEDED
Status: DISCONTINUED | OUTPATIENT
Start: 2023-12-04 | End: 2023-12-04 | Stop reason: SURG

## 2023-12-04 RX ORDER — DIAZEPAM 2 MG/1
4 TABLET ORAL EVERY 8 HOURS PRN
Status: DISCONTINUED | OUTPATIENT
Start: 2023-12-04 | End: 2023-12-05 | Stop reason: HOSPADM

## 2023-12-04 RX ORDER — LIDOCAINE HYDROCHLORIDE 10 MG/ML
0.5 INJECTION, SOLUTION EPIDURAL; INFILTRATION; INTRACAUDAL; PERINEURAL ONCE AS NEEDED
Status: DISCONTINUED | OUTPATIENT
Start: 2023-12-04 | End: 2023-12-04 | Stop reason: SDUPTHER

## 2023-12-04 RX ORDER — ACETAMINOPHEN 325 MG/1
650 TABLET ORAL EVERY 4 HOURS PRN
Status: DISCONTINUED | OUTPATIENT
Start: 2023-12-04 | End: 2023-12-05 | Stop reason: HOSPADM

## 2023-12-04 RX ORDER — SODIUM CHLORIDE 0.9 % (FLUSH) 0.9 %
10 SYRINGE (ML) INJECTION EVERY 12 HOURS SCHEDULED
Status: DISCONTINUED | OUTPATIENT
Start: 2023-12-04 | End: 2023-12-04

## 2023-12-04 RX ORDER — MAGNESIUM HYDROXIDE 1200 MG/15ML
LIQUID ORAL AS NEEDED
Status: DISCONTINUED | OUTPATIENT
Start: 2023-12-04 | End: 2023-12-04 | Stop reason: HOSPADM

## 2023-12-04 RX ORDER — SODIUM CHLORIDE 0.9 % (FLUSH) 0.9 %
10 SYRINGE (ML) INJECTION AS NEEDED
Status: DISCONTINUED | OUTPATIENT
Start: 2023-12-04 | End: 2023-12-04

## 2023-12-04 RX ORDER — LIDOCAINE HYDROCHLORIDE 10 MG/ML
0.5 INJECTION, SOLUTION EPIDURAL; INFILTRATION; INTRACAUDAL; PERINEURAL ONCE AS NEEDED
Status: DISCONTINUED | OUTPATIENT
Start: 2023-12-04 | End: 2023-12-04 | Stop reason: HOSPADM

## 2023-12-04 RX ORDER — DEXAMETHASONE SODIUM PHOSPHATE 10 MG/ML
INJECTION, SOLUTION INTRAMUSCULAR; INTRAVENOUS AS NEEDED
Status: DISCONTINUED | OUTPATIENT
Start: 2023-12-04 | End: 2023-12-04 | Stop reason: SURG

## 2023-12-04 RX ORDER — ONDANSETRON 4 MG/1
4 TABLET, FILM COATED ORAL EVERY 6 HOURS PRN
Status: DISCONTINUED | OUTPATIENT
Start: 2023-12-04 | End: 2023-12-05 | Stop reason: HOSPADM

## 2023-12-04 RX ORDER — DOCUSATE SODIUM 100 MG/1
100 CAPSULE, LIQUID FILLED ORAL 2 TIMES DAILY
Status: DISCONTINUED | OUTPATIENT
Start: 2023-12-04 | End: 2023-12-05 | Stop reason: HOSPADM

## 2023-12-04 RX ORDER — PROMETHAZINE HYDROCHLORIDE 12.5 MG/1
12.5 TABLET ORAL EVERY 6 HOURS PRN
Status: DISCONTINUED | OUTPATIENT
Start: 2023-12-04 | End: 2023-12-05 | Stop reason: HOSPADM

## 2023-12-04 RX ORDER — ONDANSETRON 2 MG/ML
INJECTION INTRAMUSCULAR; INTRAVENOUS AS NEEDED
Status: DISCONTINUED | OUTPATIENT
Start: 2023-12-04 | End: 2023-12-04 | Stop reason: SURG

## 2023-12-04 RX ORDER — NALOXONE HCL 0.4 MG/ML
0.4 VIAL (ML) INJECTION
Status: DISCONTINUED | OUTPATIENT
Start: 2023-12-04 | End: 2023-12-05 | Stop reason: HOSPADM

## 2023-12-04 RX ORDER — HEPARIN SODIUM 5000 [USP'U]/ML
5000 INJECTION, SOLUTION INTRAVENOUS; SUBCUTANEOUS EVERY 8 HOURS SCHEDULED
Status: DISCONTINUED | OUTPATIENT
Start: 2023-12-05 | End: 2023-12-05 | Stop reason: HOSPADM

## 2023-12-04 RX ORDER — LEVOTHYROXINE SODIUM 0.03 MG/1
25 TABLET ORAL
Status: DISCONTINUED | OUTPATIENT
Start: 2023-12-05 | End: 2023-12-05 | Stop reason: HOSPADM

## 2023-12-04 RX ORDER — SODIUM CHLORIDE, SODIUM LACTATE, POTASSIUM CHLORIDE, CALCIUM CHLORIDE 600; 310; 30; 20 MG/100ML; MG/100ML; MG/100ML; MG/100ML
9 INJECTION, SOLUTION INTRAVENOUS CONTINUOUS
Status: DISCONTINUED | OUTPATIENT
Start: 2023-12-04 | End: 2023-12-04 | Stop reason: SDUPTHER

## 2023-12-04 RX ORDER — SODIUM CHLORIDE, SODIUM LACTATE, POTASSIUM CHLORIDE, CALCIUM CHLORIDE 600; 310; 30; 20 MG/100ML; MG/100ML; MG/100ML; MG/100ML
9 INJECTION, SOLUTION INTRAVENOUS CONTINUOUS
Status: DISCONTINUED | OUTPATIENT
Start: 2023-12-04 | End: 2023-12-04

## 2023-12-04 RX ORDER — FENTANYL CITRATE 50 UG/ML
50 INJECTION, SOLUTION INTRAMUSCULAR; INTRAVENOUS
Status: DISCONTINUED | OUTPATIENT
Start: 2023-12-04 | End: 2023-12-04 | Stop reason: HOSPADM

## 2023-12-04 RX ORDER — HYDROCODONE BITARTRATE AND ACETAMINOPHEN 5; 325 MG/1; MG/1
1 TABLET ORAL EVERY 4 HOURS PRN
Status: DISCONTINUED | OUTPATIENT
Start: 2023-12-04 | End: 2023-12-05 | Stop reason: HOSPADM

## 2023-12-04 RX ORDER — POLYETHYLENE GLYCOL 3350 17 G/17G
17 POWDER, FOR SOLUTION ORAL DAILY
Status: DISCONTINUED | OUTPATIENT
Start: 2023-12-04 | End: 2023-12-05 | Stop reason: HOSPADM

## 2023-12-04 RX ORDER — MIDAZOLAM HYDROCHLORIDE 1 MG/ML
1 INJECTION INTRAMUSCULAR; INTRAVENOUS
Status: DISCONTINUED | OUTPATIENT
Start: 2023-12-04 | End: 2023-12-04 | Stop reason: SDUPTHER

## 2023-12-04 RX ORDER — PROPOFOL 10 MG/ML
VIAL (ML) INTRAVENOUS AS NEEDED
Status: DISCONTINUED | OUTPATIENT
Start: 2023-12-04 | End: 2023-12-04 | Stop reason: SURG

## 2023-12-04 RX ORDER — FAMOTIDINE 20 MG/1
20 TABLET, FILM COATED ORAL 2 TIMES DAILY
Status: DISCONTINUED | OUTPATIENT
Start: 2023-12-04 | End: 2023-12-05 | Stop reason: HOSPADM

## 2023-12-04 RX ORDER — MORPHINE SULFATE 4 MG/ML
4 INJECTION, SOLUTION INTRAMUSCULAR; INTRAVENOUS
Status: DISCONTINUED | OUTPATIENT
Start: 2023-12-04 | End: 2023-12-05 | Stop reason: HOSPADM

## 2023-12-04 RX ORDER — SODIUM CHLORIDE, SODIUM LACTATE, POTASSIUM CHLORIDE, CALCIUM CHLORIDE 600; 310; 30; 20 MG/100ML; MG/100ML; MG/100ML; MG/100ML
9 INJECTION, SOLUTION INTRAVENOUS CONTINUOUS
Status: DISCONTINUED | OUTPATIENT
Start: 2023-12-04 | End: 2023-12-05 | Stop reason: HOSPADM

## 2023-12-04 RX ORDER — FAMOTIDINE 20 MG/1
20 TABLET, FILM COATED ORAL ONCE
Status: COMPLETED | OUTPATIENT
Start: 2023-12-04 | End: 2023-12-04

## 2023-12-04 RX ORDER — SODIUM CHLORIDE, SODIUM LACTATE, POTASSIUM CHLORIDE, CALCIUM CHLORIDE 600; 310; 30; 20 MG/100ML; MG/100ML; MG/100ML; MG/100ML
75 INJECTION, SOLUTION INTRAVENOUS CONTINUOUS
Status: DISCONTINUED | OUTPATIENT
Start: 2023-12-04 | End: 2023-12-05 | Stop reason: HOSPADM

## 2023-12-04 RX ORDER — CEFAZOLIN SODIUM 2 G/100ML
2000 INJECTION, SOLUTION INTRAVENOUS EVERY 8 HOURS
Qty: 200 ML | Refills: 0 | Status: DISCONTINUED | OUTPATIENT
Start: 2023-12-04 | End: 2023-12-04

## 2023-12-04 RX ORDER — MIDAZOLAM HYDROCHLORIDE 1 MG/ML
1 INJECTION INTRAMUSCULAR; INTRAVENOUS
Status: DISCONTINUED | OUTPATIENT
Start: 2023-12-04 | End: 2023-12-04 | Stop reason: HOSPADM

## 2023-12-04 RX ORDER — FENTANYL CITRATE 50 UG/ML
INJECTION, SOLUTION INTRAMUSCULAR; INTRAVENOUS AS NEEDED
Status: DISCONTINUED | OUTPATIENT
Start: 2023-12-04 | End: 2023-12-04 | Stop reason: SURG

## 2023-12-04 RX ORDER — FAMOTIDINE 10 MG/ML
20 INJECTION, SOLUTION INTRAVENOUS ONCE
Status: DISCONTINUED | OUTPATIENT
Start: 2023-12-04 | End: 2023-12-04 | Stop reason: SDUPTHER

## 2023-12-04 RX ORDER — FAMOTIDINE 10 MG/ML
20 INJECTION, SOLUTION INTRAVENOUS ONCE
Status: DISCONTINUED | OUTPATIENT
Start: 2023-12-04 | End: 2023-12-04

## 2023-12-04 RX ORDER — KETOROLAC TROMETHAMINE 30 MG/ML
INJECTION, SOLUTION INTRAMUSCULAR; INTRAVENOUS AS NEEDED
Status: DISCONTINUED | OUTPATIENT
Start: 2023-12-04 | End: 2023-12-04 | Stop reason: SURG

## 2023-12-04 RX ADMIN — MUPIROCIN 1 APPLICATION: 20 OINTMENT TOPICAL at 07:01

## 2023-12-04 RX ADMIN — SODIUM CHLORIDE, POTASSIUM CHLORIDE, SODIUM LACTATE AND CALCIUM CHLORIDE 75 ML/HR: 600; 310; 30; 20 INJECTION, SOLUTION INTRAVENOUS at 12:54

## 2023-12-04 RX ADMIN — ROCURONIUM BROMIDE 50 MG: 10 SOLUTION INTRAVENOUS at 08:14

## 2023-12-04 RX ADMIN — SODIUM CHLORIDE, POTASSIUM CHLORIDE, SODIUM LACTATE AND CALCIUM CHLORIDE: 600; 310; 30; 20 INJECTION, SOLUTION INTRAVENOUS at 09:37

## 2023-12-04 RX ADMIN — SODIUM CHLORIDE 2000 MG: 900 INJECTION INTRAVENOUS at 08:10

## 2023-12-04 RX ADMIN — ONDANSETRON 4 MG: 2 INJECTION INTRAMUSCULAR; INTRAVENOUS at 10:17

## 2023-12-04 RX ADMIN — SODIUM CHLORIDE 2000 MG: 900 INJECTION INTRAVENOUS at 23:44

## 2023-12-04 RX ADMIN — LIDOCAINE HYDROCHLORIDE 50 MG: 10 INJECTION, SOLUTION EPIDURAL; INFILTRATION; INTRACAUDAL; PERINEURAL at 08:14

## 2023-12-04 RX ADMIN — HYDROCODONE BITARTRATE AND ACETAMINOPHEN 1 TABLET: 5; 325 TABLET ORAL at 16:58

## 2023-12-04 RX ADMIN — FAMOTIDINE 20 MG: 20 TABLET, FILM COATED ORAL at 12:54

## 2023-12-04 RX ADMIN — DEXAMETHASONE SODIUM PHOSPHATE 4 MG: 10 INJECTION, SOLUTION INTRAMUSCULAR; INTRAVENOUS at 08:22

## 2023-12-04 RX ADMIN — KETOROLAC TROMETHAMINE 100 MCG: 30 INJECTION, SOLUTION INTRAMUSCULAR at 08:37

## 2023-12-04 RX ADMIN — FAMOTIDINE 20 MG: 20 TABLET, FILM COATED ORAL at 22:28

## 2023-12-04 RX ADMIN — SODIUM CHLORIDE, POTASSIUM CHLORIDE, SODIUM LACTATE AND CALCIUM CHLORIDE 9 ML/HR: 600; 310; 30; 20 INJECTION, SOLUTION INTRAVENOUS at 06:53

## 2023-12-04 RX ADMIN — SUGAMMADEX 200 MG: 100 INJECTION, SOLUTION INTRAVENOUS at 10:17

## 2023-12-04 RX ADMIN — LIDOCAINE HYDROCHLORIDE 0.5 ML: 10 INJECTION, SOLUTION EPIDURAL; INFILTRATION; INTRACAUDAL; PERINEURAL at 06:53

## 2023-12-04 RX ADMIN — POLYETHYLENE GLYCOL 3350 17 G: 17 POWDER, FOR SOLUTION ORAL at 12:54

## 2023-12-04 RX ADMIN — HYDROCODONE BITARTRATE AND ACETAMINOPHEN 1 TABLET: 5; 325 TABLET ORAL at 23:44

## 2023-12-04 RX ADMIN — FAMOTIDINE 20 MG: 20 TABLET, FILM COATED ORAL at 07:00

## 2023-12-04 RX ADMIN — SODIUM CHLORIDE 2000 MG: 900 INJECTION INTRAVENOUS at 16:58

## 2023-12-04 RX ADMIN — PROPOFOL 200 MG: 10 INJECTION, EMULSION INTRAVENOUS at 08:14

## 2023-12-04 RX ADMIN — HYDROCODONE BITARTRATE AND ACETAMINOPHEN 1 TABLET: 5; 325 TABLET ORAL at 12:54

## 2023-12-04 RX ADMIN — DOCUSATE SODIUM 100 MG: 100 CAPSULE, LIQUID FILLED ORAL at 12:54

## 2023-12-04 RX ADMIN — DOCUSATE SODIUM 100 MG: 100 CAPSULE, LIQUID FILLED ORAL at 22:28

## 2023-12-04 RX ADMIN — KETOROLAC TROMETHAMINE 30 MG: 30 INJECTION, SOLUTION INTRAMUSCULAR at 10:00

## 2023-12-04 NOTE — ANESTHESIA PREPROCEDURE EVALUATION
Anesthesia Evaluation     Patient summary reviewed and Nursing notes reviewed   no history of anesthetic complications:   NPO Solid Status: > 8 hours  NPO Liquid Status: > 2 hours           Airway   Mallampati: II  TM distance: >3 FB  Neck ROM: full  No difficulty expected  Dental - normal exam     Pulmonary - normal exam    breath sounds clear to auscultation  (+) a smoker (quit x 2 wk, previous 1/2 ppd),sleep apnea (Non-compliant with CPAP)  Cardiovascular - negative cardio ROS and normal exam    ECG reviewed  Rhythm: regular  Rate: normal        Neuro/Psych- negative ROS  GI/Hepatic/Renal/Endo    (+) obesity, thyroid problem hypothyroidism    ROS Comment: S/p colectomy for diverticulitis    Musculoskeletal     Abdominal    Substance History   (+) alcohol use (8-10 drinks/wk)     OB/GYN          Other                    Anesthesia Plan    ASA 2     general with block     (Bilateral TAP blocks post-induction for post-operative analgesia per request of Dr. Catalan  )  intravenous induction     Anesthetic plan, risks, benefits, and alternatives have been provided, discussed and informed consent has been obtained with: patient.    Plan discussed with CRNA.    CODE STATUS:

## 2023-12-04 NOTE — OP NOTE
General Surgery Operative Note    Arie Hayden  [id number]  1980    Date of Surgery:  12/4/2023 10:35 EST    Pre-op Diagnosis: Incisional hernia    Post-op Diagnosis: Incisional hernia greater than 3 cm    Procedure: Open incisional herniorrhaphy with mesh, greater than 3 cm           Implant Prolene mesh preperitoneal space    Surgeon: Royer Catalan MD    Circulator: Petra De León RN; Steffi Garcia RN; Amanda Clifton RN  Scrub Person: Angela Holder  Assistant: Dangelo Griffiths PA-C     Assistant: Dangelo Griffiths PA-C  was responsible for performing the following activities: Retraction, Suction, Irrigation, Suturing, Closing, and Placing Dressing and their skilled assistance was necessary for the success of this case.    Anesthesia: General with Block    Fluids: 1200 mL of crystalloid    Estimated Blood Loss: Less than 50 mL    Urine Voided: Not recorded    Specimens: Hernia sac and surrounding fascia                Complications: None apparent    History:   43-year-old gentleman with past surgical history significant for open sigmoid colectomy secondary to recurrent diverticulitis presents with a periumbilical incisional hernia.      The risks and benefits of exploratory laparotomy with incisional herniorrhaphy and mesh placement were rehashed.  Our discussion included but was not limited to: bleeding, infection, injury to adjacent viscera (colon, small bowel etc.), recurrent hernia, chronic pain, mesh complications (fistula formation etc.), need for reintervention, and medical issues from a cardiopulmonary and deep venous thrombosis standpoint.  All questions were answered and they understood and wished to proceed with surgical intervention.    Procedure:      After informed consent, the patient was taken to the operating room and placed in the supine position.  Appropriate antibiotic prophylaxis was given to the patient. General anesthesia was induced, bilateral TAP blocks were  placed by anesthesia, the abdomen was then prepped and draped in the standard sterile fashion. An Ioban was placed on the skin. A time out was observed.     Began with a midline laparotomy in the standard fashion.  I dissected down through the subcutaneous elements and encountered a hernia sac at the level of the umbilicus and superiorly.  This was opened sharply and the small bowel reduced.  The incision was extended to good fascia superiorly and inferiorly.  The hernia sac was circumferentially excised and passed off the specimen.  At this point I created a preperitoneal plane circumferentially with adequate room for mesh placement.  Inferiorly along his prior incision he had no palpable large other hernia noted.  In the lower midline the fascia was a bit attenuated.  The midline peritoneal defect was then reapproximated with a running 3-0 Vicryl suture.  Prolene mesh, 15 x 7 cm was brought onto the field and cut appropriately.  This was tacked into place, in the preperitoneal space, at 3, 6, 9, and 12:00 with 0 Vicryl.   Meticulous hemostasis was obtained in the space was copiously irrigated with Irrisept.  The fascial edges along the hernia were sharply debrided back to good healthy fascia.  I then reapproximated the midline fascial defect, 9 cm, with interrupted 0 PDS.  The subcutaneous space was again copiously irrigated and meticulous hemostasis obtained.  The potential deep space was reapproximated with interrupted 0 Vicryl suture.  The skin was run with a 3-0 subcuticular Monocryl.  Mastisol, Steri-Strips, and a Coverderm were used to dress the wound.  An abdominal binder was placed around his abdomen.     Sterile dressings were placed on the wound.  All lap and needle counts were reported as correct at the end of the procedure ×2.  The patient was then transferred to the PACU.    Royer Catalan MD     Date: 12/4/2023  Time: 10:35 EST

## 2023-12-04 NOTE — H&P
"Catarino Rojas is a 76year old male here for  Chief Complaint   Patient presents with   â¢ Cancer     Non-small cell lung cancer, left      Denies latex allergy or sensitivity. Medication verified and med list updated. PCP and Pharmacy verified. Social History     Tobacco Use   Smoking Status Current Every Day Smoker   â¢ Packs/day: 0.50   â¢ Years: 45.00   â¢ Pack years: 22.50   â¢ Types: Cigarettes   Smokeless Tobacco Never Used     Advance Directives Filed: Yes    ECOG:   ECOG [11/18/20 1342]   ECOG Performance Status 0       Height: No.  Ht Readings from Last 1 Encounters:   08/19/20 5' 10.5"" (1.791 m)     Weight:Yes, shoes off. Wt Readings from Last 3 Encounters:   10/27/20 66 kg   10/06/20 67.4 kg   09/22/20 67.2 kg       BMI: There is no height or weight on file to calculate BMI.       " Pre-Op H&P  Arie Hayden  [id number]  1980    Chief complaint: incisional hernia    HPI:  Patient is a 43 y.o.male who presents with a incisional hernia following a colectomy in 2019. He first noticed the hernia about several month ago and reports it is enlarging. He is scheduled for an open incisional hernia repair with mesh. He does not take blood thinners routinely.    Review of Systems:  General ROS: negative for chills, fever or skin lesions.  Cardiovascular ROS: no chest pain or dyspnea on exertion  Respiratory ROS: no cough, shortness of breath, or wheezing    Allergies:   Allergies   Allergen Reactions    Pollen Extract Other (See Comments)     Sinus related       Home Meds:    No current facility-administered medications on file prior to encounter.     Current Outpatient Medications on File Prior to Encounter   Medication Sig Dispense Refill    loratadine (CLARITIN) 10 MG tablet Take 1 tablet by mouth Daily As Needed for Allergies.      Seysara 150 MG tablet Take 1 tablet by mouth 3 (Three) Times a Week.         PMH:   Past Medical History:   Diagnosis Date    Allergic rhinitis     Colon polyp     COVID     Disease of thyroid gland     HYPO    Diverticulitis 01/15/2019    Diverticulosis     Fatigue     Rosacea     Scoliosis     Sleep apnea     DOES NOT USE CPAP     PSH:    Past Surgical History:   Procedure Laterality Date    APPENDECTOMY      COLON RESECTION N/A 05/06/2019    Procedure: LOWER ANTERIOR RESECTION, APPENDECTOMY, TAKEDOWN OF SPLENIC FLEXURE, EXCISION AND CLOSURE OF COLOCECAL FISTULA, UMBILICAL HERNIA REPAIR, AND PROCTOSCOPY;  Surgeon: Mitchel Beck MD;  Location: [facility];  Service: General    COLONOSCOPY      CT ABSCESS DRAIN CYST ASP      X2    TONSILLECTOMY  1987    VASECTOMY  2011    WISDOM TOOTH EXTRACTION         Immunization History:  Influenza: UTD  Pneumococcal: UTD  Tetanus: UTD    Social History:   Tobacco:   Social History     Tobacco Use   Smoking Status Some Days  "   Packs/day: 0.25    Years: 10.00    Additional pack years: 0.00    Total pack years: 2.50    Types: Cigarettes    Start date: 1/8/2004   Smokeless Tobacco Former   Tobacco Comments    occasionally       Alcohol:     Social History     Substance and Sexual Activity   Alcohol Use Yes    Alcohol/week: 10.0 standard drinks of alcohol    Types: 10 Drinks containing 0.5 oz of alcohol per week    Comment: 8-10/week       Vitals:           BP (!) 146/102 (BP Location: Right arm, Patient Position: Lying)   Pulse 81   Temp 98.2 °F (36.8 °C) (Temporal)   Resp 16   Ht 182.9 cm (72\")   Wt 100 kg (221 lb)   SpO2 99%   BMI 29.97 kg/m²     Physical Exam:  General Appearance:    Alert, cooperative, no distress, appears stated age   Head:    Normocephalic, without obvious abnormality, atraumatic   Lungs:     Clear to auscultation bilaterally, respirations unlabored    Heart:   Regular rate and rhythm, no murmur, rub or gallop    Abdomen:    Soft, nontender. No rigidity or guarding.    Breast Exam:    deferred   Genitalia:    deferred   Extremities:   Extremities normal, atraumatic, no cyanosis or edema   Skin:   Skin color, texture, turgor normal, no rashes or lesions   Neurologic:   Grossly intact   Results Review  LABS:  Lab Results   Component Value Date    WBC 6.92 11/28/2023    HGB 15.8 11/28/2023    HCT 46.6 11/28/2023    MCV 98.1 (H) 11/28/2023     11/28/2023    NEUTROABS 3.97 09/26/2023    GLUCOSE 92 11/28/2023    BUN 9 11/28/2023    CREATININE 0.78 11/28/2023    EGFRIFNONA 90 09/20/2021     11/28/2023    K 4.2 11/28/2023     11/28/2023    CO2 25.0 11/28/2023    CALCIUM 9.1 11/28/2023    ALBUMIN 4.3 11/28/2023    AST 51 (H) 11/28/2023    ALT 94 (H) 11/28/2023    BILITOT 0.7 11/28/2023    PTT 28.2 01/10/2019    INR 1.08 01/10/2019   A1c: 5.20%    RADIOLOGY:  No radiology results for the last 3 days     I reviewed the patient's new imaging results and agree with the interpretation.    Impression: " "incisional hernia    Plan: open incisional hernia repair with mesh    Byron Doan PA-C   12/4/2023   06:53 EST     I have reviewed the above note, my prior note(s), appropriate imaging, and labs.  I have again discussed the risks and benefits of exploratory laparotomy with incisional hernia repair and mesh with the patient.  All of their questions have been answered.  They understand and wish to proceed with surgical intervention.    CBC  Results from last 7 days   Lab Units 11/28/23  1040   WBC 10*3/mm3 6.92   HEMOGLOBIN g/dL 15.8   HEMATOCRIT % 46.6   PLATELETS 10*3/mm3 270       CMP  Results from last 7 days   Lab Units 11/28/23  1040   SODIUM mmol/L 139   POTASSIUM mmol/L 4.2   CHLORIDE mmol/L 103   CO2 mmol/L 25.0   BUN mg/dL 9   CREATININE mg/dL 0.78   CALCIUM mg/dL 9.1   BILIRUBIN mg/dL 0.7   ALK PHOS U/L 71   ALT (SGPT) U/L 94*   AST (SGOT) U/L 51*   GLUCOSE mg/dL 92       Coagulation Cascade  PTT   Date Value Ref Range Status   01/10/2019 28.2 24.0 - 31.0 seconds Final     INR   Date Value Ref Range Status   01/10/2019 1.08 0.91 - 1.09 Final     Protime   Date Value Ref Range Status   01/10/2019 11.3 9.6 - 11.5 Seconds Final       No components found for: \"ECHO\"            "

## 2023-12-04 NOTE — ANESTHESIA POSTPROCEDURE EVALUATION
Patient: Arie Hayden    Procedure Summary       Date: 12/04/23 [facility] / [facility]:  [facility]    Anesthesia Start: 0810 Anesthesia Stop: 1045    Procedure: OPEN INCISIONAL HERNIA REPAIR WITH MESH (Abdomen) Diagnosis:     Surgeons: Royer Catalan MD Provider: Akbar Gant MD    Anesthesia Type: general with block ASA Status: 2            Anesthesia Type: general with block    Vitals  Vitals Value Taken Time   /85 12/04/23 1115   Temp 98 °F (36.7 °C) 12/04/23 1115   Pulse 73 12/04/23 1130   Resp 18 12/04/23 1115   SpO2 95 % 12/04/23 1130           Post Anesthesia Care and Evaluation    Patient location during evaluation: PACU  Patient participation: complete - patient participated  Level of consciousness: awake and alert  Pain management: adequate    Airway patency: patent  Anesthetic complications: No anesthetic complications  PONV Status: none  Cardiovascular status: hemodynamically stable and acceptable  Respiratory status: nonlabored ventilation, acceptable and nasal cannula  Hydration status: acceptable

## 2023-12-04 NOTE — ANESTHESIA PROCEDURE NOTES
"Peripheral Block    Pre-sedation assessment completed: 12/4/2023 7:54 AM    Patient reassessed immediately prior to procedure    Patient location during procedure: OR  Start time: 12/4/2023 7:54 AM  Reason for block: at surgeon's request and post-op pain management  Performed by  CRNA/CAA: Joe Martinez CRNA  Preanesthetic Checklist  Completed: patient identified, IV checked, site marked, risks and benefits discussed, surgical consent, monitors and equipment checked, pre-op evaluation and timeout performed  Prep:  Pt Position: supine  Sterile barriers:cap, gloves, mask and washed/disinfected hands  Prep: ChloraPrep  Patient monitoring: blood pressure monitoring, continuous pulse oximetry and EKG  Procedure    Sedation: yes  Performed under: general  Guidance:ultrasound guided  Images:still images obtained, printed/placed on chart    Laterality:Bilateral  Block Type:TAP  Injection Technique:single-shot  Needle Type:short-bevel and echogenic  Needle Gauge:20 G  Resistance on Injection: none          Medications  Preservative Free Saline:10ml  Comment:Block Injection:  LA dose divided between Right and Left block        Post Assessment  Injection Assessment: negative aspiration for heme, incremental injection and no paresthesia on injection  Patient Tolerance:comfortable throughout block  Complications:no  Additional Notes    Mid-Axillary/Lateral TAPs    A high-frequency linear transducer, with sterile cover, was placed in the midaxillary line between the ASIS and costal margin. The External Oblique Muscle (EOM), Internal Oblique Muscle (IOM), Transverse Abdominus Muscle (ROUSE), and Peritoneum were identified. The insertion site was prepped in sterile fashion and then localized with 2-5 ml of 1% Lidocaine. Using ultrasound-guidance, a 20-gauge B-Perry 4\" Ultraplex 360 non-stimulating echogenic needle was advanced in plane, from medial to lateral, until the tip of the needle was in the fascial plane between the IOM " and ROUSE. 1-3ml of preservative free normal saline was used to hydro-dissect the fascial planes. After the fascial plane was verified, the local anesthetic (LA) was injected. The procedure was repeated on the opposite side for bilateral coverage. Aspiration every 5 ml to prevent intravascular injection. Injection was completed with negative aspiration of blood and negative intravascular injection. Injection pressures were normal with minimal resistance. Midaxillary TAPs should reach intercostal nerves T10- T11 and the subcostal nerve T12.

## 2023-12-04 NOTE — ANESTHESIA PROCEDURE NOTES
Airway  Urgency: elective    Date/Time: 12/4/2023 8:16 AM  Airway not difficult    General Information and Staff    Patient location during procedure: OR  CRNA/CAA: Joe Martinez CRNA    Indications and Patient Condition  Indications for airway management: airway protection    Preoxygenated: yes  MILS not maintained throughout  Mask difficulty assessment: 1 - vent by mask    Final Airway Details  Final airway type: endotracheal airway      Successful airway: ETT  Cuffed: yes   Successful intubation technique: direct laryngoscopy  Endotracheal tube insertion site: oral  Blade: Velez  Blade size: 3  ETT size (mm): 7.0  Cormack-Lehane Classification: grade IIb - view of arytenoids or posterior of glottis only  Placement verified by: chest auscultation and capnometry   Measured from: lips  ETT/EBT  to lips (cm): 20  Number of attempts at approach: 1  Assessment: lips, teeth, and gum same as pre-op and atraumatic intubation    Additional Comments  Long airway,, velez 3    Negative epigastric sounds, Breath sound equal bilaterally with symmetric chest rise and fall

## 2023-12-05 VITALS
BODY MASS INDEX: 29.93 KG/M2 | RESPIRATION RATE: 16 BRPM | SYSTOLIC BLOOD PRESSURE: 114 MMHG | HEART RATE: 63 BPM | DIASTOLIC BLOOD PRESSURE: 71 MMHG | OXYGEN SATURATION: 93 % | WEIGHT: 221 LBS | HEIGHT: 72 IN | TEMPERATURE: 97.4 F

## 2023-12-05 PROBLEM — K43.2 INCISIONAL HERNIA WITHOUT OBSTRUCTION OR GANGRENE: Status: RESOLVED | Noted: 2023-12-04 | Resolved: 2023-12-05

## 2023-12-05 PROBLEM — K43.2 INCISIONAL HERNIA: Status: RESOLVED | Noted: 2023-12-04 | Resolved: 2023-12-05

## 2023-12-05 LAB
ANION GAP SERPL CALCULATED.3IONS-SCNC: 11 MMOL/L (ref 5–15)
BASOPHILS # BLD AUTO: 0.02 10*3/MM3 (ref 0–0.2)
BASOPHILS NFR BLD AUTO: 0.2 % (ref 0–1.5)
BUN SERPL-MCNC: 10 MG/DL (ref 6–20)
BUN/CREAT SERPL: 15.2 (ref 7–25)
CALCIUM SPEC-SCNC: 8.7 MG/DL (ref 8.6–10.5)
CHLORIDE SERPL-SCNC: 106 MMOL/L (ref 98–107)
CO2 SERPL-SCNC: 23 MMOL/L (ref 22–29)
CREAT SERPL-MCNC: 0.66 MG/DL (ref 0.76–1.27)
CYTO UR: NORMAL
DEPRECATED RDW RBC AUTO: 41.5 FL (ref 37–54)
EGFRCR SERPLBLD CKD-EPI 2021: 118.6 ML/MIN/1.73
EOSINOPHIL # BLD AUTO: 0.01 10*3/MM3 (ref 0–0.4)
EOSINOPHIL NFR BLD AUTO: 0.1 % (ref 0.3–6.2)
ERYTHROCYTE [DISTWIDTH] IN BLOOD BY AUTOMATED COUNT: 11.8 % (ref 12.3–15.4)
GLUCOSE SERPL-MCNC: 133 MG/DL (ref 65–99)
HCT VFR BLD AUTO: 37.7 % (ref 37.5–51)
HGB BLD-MCNC: 13.2 G/DL (ref 13–17.7)
IMM GRANULOCYTES # BLD AUTO: 0.03 10*3/MM3 (ref 0–0.05)
IMM GRANULOCYTES NFR BLD AUTO: 0.3 % (ref 0–0.5)
LAB AP CASE REPORT: NORMAL
LAB AP CLINICAL INFORMATION: NORMAL
LYMPHOCYTES # BLD AUTO: 1.16 10*3/MM3 (ref 0.7–3.1)
LYMPHOCYTES NFR BLD AUTO: 11 % (ref 19.6–45.3)
MAGNESIUM SERPL-MCNC: 2 MG/DL (ref 1.6–2.6)
MCH RBC QN AUTO: 33.2 PG (ref 26.6–33)
MCHC RBC AUTO-ENTMCNC: 35 G/DL (ref 31.5–35.7)
MCV RBC AUTO: 95 FL (ref 79–97)
MONOCYTES # BLD AUTO: 0.69 10*3/MM3 (ref 0.1–0.9)
MONOCYTES NFR BLD AUTO: 6.5 % (ref 5–12)
NEUTROPHILS NFR BLD AUTO: 8.67 10*3/MM3 (ref 1.7–7)
NEUTROPHILS NFR BLD AUTO: 81.9 % (ref 42.7–76)
NRBC BLD AUTO-RTO: 0 /100 WBC (ref 0–0.2)
PATH REPORT.FINAL DX SPEC: NORMAL
PATH REPORT.GROSS SPEC: NORMAL
PHOSPHATE SERPL-MCNC: 4.3 MG/DL (ref 2.5–4.5)
PLATELET # BLD AUTO: 224 10*3/MM3 (ref 140–450)
PMV BLD AUTO: 10.5 FL (ref 6–12)
POTASSIUM SERPL-SCNC: 4.1 MMOL/L (ref 3.5–5.2)
RBC # BLD AUTO: 3.97 10*6/MM3 (ref 4.14–5.8)
SODIUM SERPL-SCNC: 140 MMOL/L (ref 136–145)
WBC NRBC COR # BLD AUTO: 10.58 10*3/MM3 (ref 3.4–10.8)

## 2023-12-05 PROCEDURE — 84100 ASSAY OF PHOSPHORUS: CPT | Performed by: SURGERY

## 2023-12-05 PROCEDURE — 25010000002 HEPARIN (PORCINE) PER 1000 UNITS: Performed by: SURGERY

## 2023-12-05 PROCEDURE — 83735 ASSAY OF MAGNESIUM: CPT | Performed by: SURGERY

## 2023-12-05 PROCEDURE — 25810000003 LACTATED RINGERS PER 1000 ML: Performed by: SURGERY

## 2023-12-05 PROCEDURE — 85025 COMPLETE CBC W/AUTO DIFF WBC: CPT | Performed by: SURGERY

## 2023-12-05 PROCEDURE — 80048 BASIC METABOLIC PNL TOTAL CA: CPT | Performed by: SURGERY

## 2023-12-05 RX ORDER — HYDROCODONE BITARTRATE AND ACETAMINOPHEN 5; 325 MG/1; MG/1
1 TABLET ORAL EVERY 8 HOURS PRN
Qty: 10 TABLET | Refills: 0 | Status: SHIPPED | OUTPATIENT
Start: 2023-12-05

## 2023-12-05 RX ORDER — DOCUSATE SODIUM 250 MG
250 CAPSULE ORAL 2 TIMES DAILY
Qty: 14 CAPSULE | Refills: 0 | Status: SHIPPED | OUTPATIENT
Start: 2023-12-05

## 2023-12-05 RX ADMIN — POLYETHYLENE GLYCOL 3350 17 G: 17 POWDER, FOR SOLUTION ORAL at 09:09

## 2023-12-05 RX ADMIN — LEVOTHYROXINE SODIUM 25 MCG: 0.03 TABLET ORAL at 06:19

## 2023-12-05 RX ADMIN — SODIUM CHLORIDE, POTASSIUM CHLORIDE, SODIUM LACTATE AND CALCIUM CHLORIDE 75 ML/HR: 600; 310; 30; 20 INJECTION, SOLUTION INTRAVENOUS at 04:56

## 2023-12-05 RX ADMIN — MELOXICAM 7.5 MG: 7.5 TABLET ORAL at 09:09

## 2023-12-05 RX ADMIN — HYDROCODONE BITARTRATE AND ACETAMINOPHEN 1 TABLET: 5; 325 TABLET ORAL at 09:15

## 2023-12-05 RX ADMIN — DOCUSATE SODIUM 100 MG: 100 CAPSULE, LIQUID FILLED ORAL at 09:09

## 2023-12-05 RX ADMIN — HEPARIN SODIUM 5000 UNITS: 5000 INJECTION INTRAVENOUS; SUBCUTANEOUS at 06:19

## 2023-12-05 RX ADMIN — FAMOTIDINE 20 MG: 20 TABLET, FILM COATED ORAL at 09:09

## 2023-12-05 NOTE — PLAN OF CARE
Goal Outcome Evaluation:  Plan of Care Reviewed With: patient        Progress: improving  Outcome Evaluation: VSS, on RA. Pain controlled with oral meds. UOP-WNL. Pt slept well. IVF and IV ABX given as scheduled. Anticipate d/c later today. Will continue to monitor. Pt kept SCD's on until ready to sleep then requested for them to be off. Pt has ambulated several times during the day .

## 2023-12-05 NOTE — DISCHARGE SUMMARY
General Surgery Discharge Note (Dr. RAISA Catalan)    Rosemary Long PA-C    Patient Name:  Arie Hayden  Admission Date:  12/4/2023  Discharge Date:  12/5/2023    Diagnosis/Problems:  Incisional hernia    History & Hospital Course: 43-year-old young gentleman status post sigmoid colectomy for diverticular disease presents with a periumbilical incisional hernia.  He underwent incisional herniorrhaphy with preperitoneal Prolene mesh placement on 12/4/2023.  His operation was uneventful as has been his recovery. The patient is tolerating an appropriate diet and is ready to be discharged.    Temp:  [96.3 °F (35.7 °C)-98.5 °F (36.9 °C)] 97.4 °F (36.3 °C)  Heart Rate:  [63-84] 63  Resp:  [16-19] 16  BP: (114-146)/(58-85) 114/71    Physical: appropriate post operative examination.      Assessment:  Doing well may DC home.    Plan:    DC Home  Diet: Regular high-fiber diet as tolerated.  Restrictions: No heavy lifting greater than 10 lbs x6 weeks.  May shower as needed, no tub bathes.  Do not submerge the incisions underwater.  Remove the Steri-Strips after 5 days.  He is to wear the abdominal binder x6 weeks.    Prescriptions: Norco 5 mg tablets #10, no refills    May resume all prior home medications.  No current facility-administered medications on file prior to encounter.     Current Outpatient Medications on File Prior to Encounter   Medication Sig Dispense Refill    Seysara 150 MG tablet Take 1 tablet by mouth 3 (Three) Times a Week.      loratadine (CLARITIN) 10 MG tablet Take 1 tablet by mouth Daily As Needed for Allergies.          Follow up in 2-3 weeks at [facility], [phone].    Royer Catalan MD,  12/5/2023 - 09:09 EST

## 2024-01-06 DIAGNOSIS — E55.9 VITAMIN D DEFICIENCY: Primary | ICD-10-CM

## 2024-01-06 DIAGNOSIS — R73.09 ELEVATED GLUCOSE: ICD-10-CM

## 2024-01-06 DIAGNOSIS — E03.9 HYPOTHYROIDISM, UNSPECIFIED TYPE: ICD-10-CM

## 2024-01-06 DIAGNOSIS — Z13.220 LIPID SCREENING: ICD-10-CM

## 2024-01-16 DIAGNOSIS — E55.9 VITAMIN D DEFICIENCY: ICD-10-CM

## 2024-01-16 RX ORDER — ERGOCALCIFEROL 1.25 MG/1
50000 CAPSULE ORAL WEEKLY
Qty: 4 CAPSULE | Refills: 3 | Status: SHIPPED | OUTPATIENT
Start: 2024-01-16 | End: 2024-01-19 | Stop reason: SDUPTHER

## 2024-01-17 ENCOUNTER — LAB (OUTPATIENT)
Dept: LAB | Facility: HOSPITAL | Age: 44
End: 2024-01-17
Payer: COMMERCIAL

## 2024-01-17 DIAGNOSIS — Z13.220 LIPID SCREENING: ICD-10-CM

## 2024-01-17 DIAGNOSIS — R73.09 ELEVATED GLUCOSE: ICD-10-CM

## 2024-01-17 DIAGNOSIS — E55.9 VITAMIN D DEFICIENCY: ICD-10-CM

## 2024-01-17 DIAGNOSIS — E03.9 HYPOTHYROIDISM, UNSPECIFIED TYPE: ICD-10-CM

## 2024-01-17 LAB — HBA1C MFR BLD: 5.5 % (ref 4.8–5.6)

## 2024-01-17 PROCEDURE — 80061 LIPID PANEL: CPT

## 2024-01-17 PROCEDURE — 84439 ASSAY OF FREE THYROXINE: CPT

## 2024-01-17 PROCEDURE — 83036 HEMOGLOBIN GLYCOSYLATED A1C: CPT

## 2024-01-17 PROCEDURE — 82306 VITAMIN D 25 HYDROXY: CPT

## 2024-01-17 PROCEDURE — 80050 GENERAL HEALTH PANEL: CPT

## 2024-01-17 PROCEDURE — 84481 FREE ASSAY (FT-3): CPT

## 2024-01-18 LAB
25(OH)D3 SERPL-MCNC: 53.6 NG/ML (ref 30–100)
ALBUMIN SERPL-MCNC: 4.3 G/DL (ref 3.5–5.2)
ALBUMIN/GLOB SERPL: 1.3 G/DL
ALP SERPL-CCNC: 73 U/L (ref 39–117)
ALT SERPL W P-5'-P-CCNC: 45 U/L (ref 1–41)
ANION GAP SERPL CALCULATED.3IONS-SCNC: 13.5 MMOL/L (ref 5–15)
AST SERPL-CCNC: 34 U/L (ref 1–40)
BASOPHILS # BLD AUTO: 0.07 10*3/MM3 (ref 0–0.2)
BASOPHILS NFR BLD AUTO: 1.1 % (ref 0–1.5)
BILIRUB SERPL-MCNC: 0.9 MG/DL (ref 0–1.2)
BUN SERPL-MCNC: 11 MG/DL (ref 6–20)
BUN/CREAT SERPL: 12.8 (ref 7–25)
CALCIUM SPEC-SCNC: 9.6 MG/DL (ref 8.6–10.5)
CHLORIDE SERPL-SCNC: 100 MMOL/L (ref 98–107)
CHOLEST SERPL-MCNC: 216 MG/DL (ref 0–200)
CO2 SERPL-SCNC: 24.5 MMOL/L (ref 22–29)
CREAT SERPL-MCNC: 0.86 MG/DL (ref 0.76–1.27)
DEPRECATED RDW RBC AUTO: 42.3 FL (ref 37–54)
EGFRCR SERPLBLD CKD-EPI 2021: 109.5 ML/MIN/1.73
EOSINOPHIL # BLD AUTO: 0.11 10*3/MM3 (ref 0–0.4)
EOSINOPHIL NFR BLD AUTO: 1.8 % (ref 0.3–6.2)
ERYTHROCYTE [DISTWIDTH] IN BLOOD BY AUTOMATED COUNT: 12.1 % (ref 12.3–15.4)
GLOBULIN UR ELPH-MCNC: 3.2 GM/DL
GLUCOSE SERPL-MCNC: 79 MG/DL (ref 65–99)
HCT VFR BLD AUTO: 45.6 % (ref 37.5–51)
HDLC SERPL-MCNC: 91 MG/DL (ref 40–60)
HGB BLD-MCNC: 15.8 G/DL (ref 13–17.7)
IMM GRANULOCYTES # BLD AUTO: 0.01 10*3/MM3 (ref 0–0.05)
IMM GRANULOCYTES NFR BLD AUTO: 0.2 % (ref 0–0.5)
LDLC SERPL CALC-MCNC: 110 MG/DL (ref 0–100)
LDLC/HDLC SERPL: 1.18 {RATIO}
LYMPHOCYTES # BLD AUTO: 1.81 10*3/MM3 (ref 0.7–3.1)
LYMPHOCYTES NFR BLD AUTO: 29.1 % (ref 19.6–45.3)
MCH RBC QN AUTO: 32.8 PG (ref 26.6–33)
MCHC RBC AUTO-ENTMCNC: 34.6 G/DL (ref 31.5–35.7)
MCV RBC AUTO: 94.8 FL (ref 79–97)
MONOCYTES # BLD AUTO: 0.64 10*3/MM3 (ref 0.1–0.9)
MONOCYTES NFR BLD AUTO: 10.3 % (ref 5–12)
NEUTROPHILS NFR BLD AUTO: 3.57 10*3/MM3 (ref 1.7–7)
NEUTROPHILS NFR BLD AUTO: 57.5 % (ref 42.7–76)
NRBC BLD AUTO-RTO: 0 /100 WBC (ref 0–0.2)
PLATELET # BLD AUTO: 281 10*3/MM3 (ref 140–450)
PMV BLD AUTO: 10.5 FL (ref 6–12)
POTASSIUM SERPL-SCNC: 4.2 MMOL/L (ref 3.5–5.2)
PROT SERPL-MCNC: 7.5 G/DL (ref 6–8.5)
RBC # BLD AUTO: 4.81 10*6/MM3 (ref 4.14–5.8)
SODIUM SERPL-SCNC: 138 MMOL/L (ref 136–145)
T3FREE SERPL-MCNC: 3.93 PG/ML (ref 2–4.4)
T4 FREE SERPL-MCNC: 1.61 NG/DL (ref 0.93–1.7)
TRIGL SERPL-MCNC: 86 MG/DL (ref 0–150)
TSH SERPL DL<=0.05 MIU/L-ACNC: 2.88 UIU/ML (ref 0.27–4.2)
VLDLC SERPL-MCNC: 15 MG/DL (ref 5–40)
WBC NRBC COR # BLD AUTO: 6.21 10*3/MM3 (ref 3.4–10.8)

## 2024-01-19 ENCOUNTER — OFFICE VISIT (OUTPATIENT)
Dept: INTERNAL MEDICINE | Facility: CLINIC | Age: 44
End: 2024-01-19
Payer: COMMERCIAL

## 2024-01-19 VITALS
DIASTOLIC BLOOD PRESSURE: 92 MMHG | TEMPERATURE: 97.6 F | BODY MASS INDEX: 30.48 KG/M2 | OXYGEN SATURATION: 97 % | WEIGHT: 225 LBS | HEIGHT: 72 IN | HEART RATE: 78 BPM | SYSTOLIC BLOOD PRESSURE: 120 MMHG

## 2024-01-19 DIAGNOSIS — R03.0 ELEVATED BLOOD PRESSURE READING: ICD-10-CM

## 2024-01-19 DIAGNOSIS — E55.9 VITAMIN D DEFICIENCY: ICD-10-CM

## 2024-01-19 DIAGNOSIS — Z72.0 TOBACCO ABUSE: ICD-10-CM

## 2024-01-19 DIAGNOSIS — Z00.00 ROUTINE MEDICAL EXAM: Primary | ICD-10-CM

## 2024-01-19 DIAGNOSIS — L71.9 ROSACEA: Chronic | ICD-10-CM

## 2024-01-19 DIAGNOSIS — G47.33 OBSTRUCTIVE SLEEP APNEA SYNDROME: ICD-10-CM

## 2024-01-19 DIAGNOSIS — Z13.220 LIPID SCREENING: ICD-10-CM

## 2024-01-19 DIAGNOSIS — E03.9 HYPOTHYROIDISM (ACQUIRED): ICD-10-CM

## 2024-01-19 RX ORDER — ERGOCALCIFEROL 1.25 MG/1
50000 CAPSULE ORAL WEEKLY
Qty: 4 CAPSULE | Refills: 3 | Status: SHIPPED | OUTPATIENT
Start: 2024-01-19

## 2024-01-19 NOTE — PROGRESS NOTES
[facility]    Arie aHyden  1980   [id number]      Patient Care Team:  Rosemary Long PA-C as PCP - General (Internal Medicine)  Mitchel Beck MD as Consulting Physician (Colon and Rectal Surgery)  Kendra Arteaga MD as Consulting Physician (Dermatology)    Chief Complaint::   Chief Complaint   Patient presents with    Elevated blood pressure reading     F/u    Hernia repair     F/u        HPI  Arie is a 43-year-old male date of birth 1980 who presents today for routine physical.  Past medical history significant for hypothyroidism, vitamin D deficiency, history of abscess of sigmoid colon status post colectomy, rosacea.   He recently underwent ventral incisional hernia repair with Dr. Catalan.  Procedure was performed on 12/4/2023.  Procedure was performed without complication.  Colon resection performed on 5/6/2019 with Dr. Beck.  He denies constipation or diarrhea.  Denies abdominal pain.    Prior to surgery, he decided to cut back significantly on smoking.  Now only smokes when he drinks.  Down to 2 to 3 cigarettes at a time.  Overall, he has gained some weight during the wintertime.  He has been off for 6 weeks due to surgery and ready to start back.    Recent labs show improved vitamin D levels at 50, normal thyroid, lipid panel within range, and improving liver function.  He denies chest pain, shortness of breath, palpitations, or headaches.        Patient Active Problem List   Diagnosis    Left lower quadrant pain    Sepsis     Tobacco abuse    Abscess of sigmoid colon due to diverticulitis    Allergic rhinitis    Fatigue    Snores    Diverticulitis    Sleep apnea    Rosacea    Alcohol use    Encounter for preventive health examination    Lipid screening    Screening PSA (prostate specific antigen)    Elevated blood pressure reading    Low back pain with right-sided sciatica    Hypothyroidism (acquired)    Vitamin D deficiency    Encounter for vitamin  deficiency screening    Overweight    Routine medical exam        Past Medical History:   Diagnosis Date    Allergic rhinitis     Colon polyp     COVID     Disease of thyroid gland     HYPO    Diverticulitis 01/15/2019    Diverticulosis     Fatigue     Rosacea     Scoliosis     Sleep apnea     DOES NOT USE CPAP       Past Surgical History:   Procedure Laterality Date    APPENDECTOMY      COLON RESECTION N/A 2019    Procedure: LOWER ANTERIOR RESECTION, APPENDECTOMY, TAKEDOWN OF SPLENIC FLEXURE, EXCISION AND CLOSURE OF COLOCECAL FISTULA, UMBILICAL HERNIA REPAIR, AND PROCTOSCOPY;  Surgeon: Mitchel Beck MD;  Location:  [facility];  Service: General    COLONOSCOPY      CT ABSCESS DRAIN CYST ASP      X2    TONSILLECTOMY  1987    VASECTOMY  2011    VENTRAL/INCISIONAL HERNIA REPAIR N/A 2023    Procedure: OPEN INCISIONAL HERNIA REPAIR WITH MESH;  Surgeon: Royer Catalan MD;  Location:  [facility];  Service: General;  Laterality: N/A;    WISDOM TOOTH EXTRACTION         Family History   Problem Relation Age of Onset    Factor V Leiden deficiency Father     Suicidality Father     Factor IX deficiency Father     Breast cancer Maternal Grandmother     Lung cancer Maternal Grandfather     Factor V Leiden deficiency Paternal Grandfather     Prostate cancer Paternal Grandfather     Hyperlipidemia Mother     Hypothyroidism Mother     No Known Problems Sister     No Known Problems Brother     No Known Problems Daughter     No Known Problems Daughter     Hyperlipidemia Maternal Uncle        Social History     Socioeconomic History    Marital status:    Tobacco Use    Smoking status: Former     Packs/day: 0.25     Years: 10.00     Additional pack years: 0.00     Total pack years: 2.50     Types: Cigarettes     Start date: 2004     Quit date: 2023     Years since quittin.1    Smokeless tobacco: Former    Tobacco comments:     occasionally    Vaping Use    Vaping Use: Never used   Substance and  "Sexual Activity    Alcohol use: Yes     Alcohol/week: 10.0 standard drinks of alcohol     Types: 10 Drinks containing 0.5 oz of alcohol per week     Comment: 8-10/week    Drug use: No    Sexual activity: Yes     Partners: Female     Birth control/protection: Vasectomy, Hysterectomy       Allergies   Allergen Reactions    Pollen Extract Other (See Comments)     Sinus related       Review of Systems   Constitutional:  Positive for unexpected weight gain. Negative for activity change, appetite change, diaphoresis, fatigue and unexpected weight loss.   HENT:  Negative for hearing loss.    Eyes:  Negative for blurred vision, double vision and visual disturbance.   Respiratory:  Negative for chest tightness and shortness of breath.    Cardiovascular:  Negative for chest pain, palpitations and leg swelling.   Gastrointestinal:  Negative for abdominal pain, blood in stool, GERD and indigestion.   Endocrine: Negative for cold intolerance and heat intolerance.   Genitourinary:  Negative for dysuria and hematuria.   Musculoskeletal:  Negative for arthralgias and myalgias.   Skin:  Negative for skin lesions.   Neurological:  Negative for tremors, seizures, syncope, speech difficulty, weakness, headache, memory problem and confusion.   Hematological:  Does not bruise/bleed easily.   Psychiatric/Behavioral:  Negative for sleep disturbance and depressed mood. The patient is not nervous/anxious.         Vital Signs  Vitals:    01/19/24 0833   BP: 120/92   BP Location: Left arm   Patient Position: Sitting   Cuff Size: Adult   Pulse: 78   Temp: 97.6 °F (36.4 °C)   TempSrc: Infrared   SpO2: 97%   Weight: 102 kg (225 lb)   Height: 182.9 cm (72\")   PainSc: 0-No pain     Body mass index is 30.52 kg/m².        Advance Care Planning   ACP discussion was held with the patient during this visit. Patient does not have an advance directive, information provided.       Current Outpatient Medications:     docusate sodium (COLACE) 250 MG capsule, " Take 1 capsule by mouth 2 (Two) Times a Day., Disp: 14 capsule, Rfl: 0    levothyroxine (SYNTHROID, LEVOTHROID) 25 MCG tablet, Take 1 tablet by mouth Every Morning., Disp: 90 tablet, Rfl: 1    loratadine (CLARITIN) 10 MG tablet, Take 1 tablet by mouth Daily As Needed for Allergies., Disp: , Rfl:     Seysara 150 MG tablet, Take 1 tablet by mouth 3 (Three) Times a Week., Disp: , Rfl:     vitamin D (ERGOCALCIFEROL) 1.25 MG (64810 UT) capsule capsule, Take 1 capsule by mouth 1 (One) Time Per Week., Disp: 4 capsule, Rfl: 3    Physical Exam  Vitals reviewed.   Constitutional:       Appearance: Normal appearance. He is well-developed.   HENT:      Head: Normocephalic and atraumatic.      Right Ear: Hearing, tympanic membrane, ear canal and external ear normal.      Left Ear: Hearing, tympanic membrane, ear canal and external ear normal.      Nose: Nose normal.      Mouth/Throat:      Mouth: Mucous membranes are moist.      Pharynx: Oropharynx is clear. Uvula midline.   Eyes:      General: Lids are normal.      Conjunctiva/sclera: Conjunctivae normal.      Pupils: Pupils are equal, round, and reactive to light.   Cardiovascular:      Rate and Rhythm: Normal rate and regular rhythm.      Heart sounds: Normal heart sounds.   Pulmonary:      Effort: Pulmonary effort is normal.      Breath sounds: Normal breath sounds.   Abdominal:      General: A surgical scar is present. Bowel sounds are normal.      Palpations: Abdomen is soft.       Musculoskeletal:         General: Normal range of motion.      Cervical back: Full passive range of motion without pain, normal range of motion and neck supple.   Skin:     General: Skin is warm and dry.   Neurological:      Mental Status: He is alert and oriented to person, place, and time.      Deep Tendon Reflexes: Reflexes are normal and symmetric.   Psychiatric:         Speech: Speech normal.         Behavior: Behavior normal.         Thought Content: Thought content normal.          Judgment: Judgment normal.          ACE III MINI            Results Review:    Recent Results (from the past 672 hour(s))   Comprehensive Metabolic Panel    Collection Time: 01/17/24 12:53 PM    Specimen: Blood   Result Value Ref Range    Glucose 79 65 - 99 mg/dL    BUN 11 6 - 20 mg/dL    Creatinine 0.86 0.76 - 1.27 mg/dL    Sodium 138 136 - 145 mmol/L    Potassium 4.2 3.5 - 5.2 mmol/L    Chloride 100 98 - 107 mmol/L    CO2 24.5 22.0 - 29.0 mmol/L    Calcium 9.6 8.6 - 10.5 mg/dL    Total Protein 7.5 6.0 - 8.5 g/dL    Albumin 4.3 3.5 - 5.2 g/dL    ALT (SGPT) 45 (H) 1 - 41 U/L    AST (SGOT) 34 1 - 40 U/L    Alkaline Phosphatase 73 39 - 117 U/L    Total Bilirubin 0.9 0.0 - 1.2 mg/dL    Globulin 3.2 gm/dL    A/G Ratio 1.3 g/dL    BUN/Creatinine Ratio 12.8 7.0 - 25.0    Anion Gap 13.5 5.0 - 15.0 mmol/L    eGFR 109.5 >60.0 mL/min/1.73   Hemoglobin A1c    Collection Time: 01/17/24 12:53 PM    Specimen: Blood   Result Value Ref Range    Hemoglobin A1C 5.50 4.80 - 5.60 %   Lipid Panel    Collection Time: 01/17/24 12:53 PM    Specimen: Blood   Result Value Ref Range    Total Cholesterol 216 (H) 0 - 200 mg/dL    Triglycerides 86 0 - 150 mg/dL    HDL Cholesterol 91 (H) 40 - 60 mg/dL    LDL Cholesterol  110 (H) 0 - 100 mg/dL    VLDL Cholesterol 15 5 - 40 mg/dL    LDL/HDL Ratio 1.18    TSH    Collection Time: 01/17/24 12:53 PM    Specimen: Blood   Result Value Ref Range    TSH 2.880 0.270 - 4.200 uIU/mL   T4, Free    Collection Time: 01/17/24 12:53 PM    Specimen: Blood   Result Value Ref Range    Free T4 1.61 0.93 - 1.70 ng/dL   T3, Free    Collection Time: 01/17/24 12:53 PM    Specimen: Blood   Result Value Ref Range    T3, Free 3.93 2.00 - 4.40 pg/mL   Vitamin D,25-Hydroxy    Collection Time: 01/17/24 12:53 PM    Specimen: Blood   Result Value Ref Range    25 Hydroxy, Vitamin D 53.6 30.0 - 100.0 ng/ml   CBC Auto Differential    Collection Time: 01/17/24 12:53 PM    Specimen: Blood   Result Value Ref Range    WBC 6.21 3.40 -  10.80 10*3/mm3    RBC 4.81 4.14 - 5.80 10*6/mm3    Hemoglobin 15.8 13.0 - 17.7 g/dL    Hematocrit 45.6 37.5 - 51.0 %    MCV 94.8 79.0 - 97.0 fL    MCH 32.8 26.6 - 33.0 pg    MCHC 34.6 31.5 - 35.7 g/dL    RDW 12.1 (L) 12.3 - 15.4 %    RDW-SD 42.3 37.0 - 54.0 fl    MPV 10.5 6.0 - 12.0 fL    Platelets 281 140 - 450 10*3/mm3    Neutrophil % 57.5 42.7 - 76.0 %    Lymphocyte % 29.1 19.6 - 45.3 %    Monocyte % 10.3 5.0 - 12.0 %    Eosinophil % 1.8 0.3 - 6.2 %    Basophil % 1.1 0.0 - 1.5 %    Immature Grans % 0.2 0.0 - 0.5 %    Neutrophils, Absolute 3.57 1.70 - 7.00 10*3/mm3    Lymphocytes, Absolute 1.81 0.70 - 3.10 10*3/mm3    Monocytes, Absolute 0.64 0.10 - 0.90 10*3/mm3    Eosinophils, Absolute 0.11 0.00 - 0.40 10*3/mm3    Basophils, Absolute 0.07 0.00 - 0.20 10*3/mm3    Immature Grans, Absolute 0.01 0.00 - 0.05 10*3/mm3    nRBC 0.0 0.0 - 0.2 /100 WBC     Procedures    Medication Review: Medications reviewed and noted    Social History     Socioeconomic History    Marital status:    Tobacco Use    Smoking status: Former     Packs/day: 0.25     Years: 10.00     Additional pack years: 0.00     Total pack years: 2.50     Types: Cigarettes     Start date: 2004     Quit date: 2023     Years since quittin.1    Smokeless tobacco: Former    Tobacco comments:     occasionally    Vaping Use    Vaping Use: Never used   Substance and Sexual Activity    Alcohol use: Yes     Alcohol/week: 10.0 standard drinks of alcohol     Types: 10 Drinks containing 0.5 oz of alcohol per week     Comment: 8-10/week    Drug use: No    Sexual activity: Yes     Partners: Female     Birth control/protection: Vasectomy, Hysterectomy        Assessment/Plan:    Diagnoses and all orders for this visit:    1. Routine medical exam (Primary)  Overview:  Reviewed fasting labs today.  He is encouraged to continue vitamin D supplementation.  No change in thyroid medication. Try and cut back on alcohol use and smoking.  Try to increase  exercise, particularly walking and elliptical up to 4 to 5 days a week.        2. Vitamin D deficiency  Overview:  Continue vitamin D 50,000 units weekly.    Orders:  -     vitamin D (ERGOCALCIFEROL) 1.25 MG (91426 UT) capsule capsule; Take 1 capsule by mouth 1 (One) Time Per Week.  Dispense: 4 capsule; Refill: 3    3. Obstructive sleep apnea syndrome  Overview:  Difficulty with CPAP mask.  Refer to sleep medicine to reevaluate.    Orders:  -     Ambulatory Referral to Sleep Medicine    4. Elevated blood pressure reading  Overview:  Improving.  Continue to check blood pressures at home.  Try and cut back on alcohol and tobacco.        5. Lipid screening  Overview:  Try and eat a low-fat, low-cholesterol diet.  Slightly elevated LDL cholesterol, but HDL remains excellent.      6. Hypothyroidism (acquired)  Overview:  Continue levothyroxine 25 mcg daily.      7. Rosacea  Overview:  Petra Bellamy 150mg daily.      8. Tobacco abuse  Overview:  Reports smoking half a pack per day.  Now down to at the most 2-3 cigarettes a day.           Reviewed all labs with patient.  Questions answered.  He is current on immunizations and screenings.  Discussed importance of smoking cessation.  Discussed importance of regular cardiovascular exercise.  Goal is weight loss.      Patient Instructions   BMI for Adults  Body mass index (BMI) is a number found using a person's weight and height. BMI can help tell how much of a person's weight is made up of fat. BMI does not measure body fat directly. It is used instead of tests that directly measure body fat, which can be difficult and expensive.  What are BMI measurements used for?  BMI is useful to:  Find out if your weight puts you at higher risk for medical problems.  Help recommend changes, such as in diet and exercise. This can help you reach a healthy weight. BMI screening can be done again to see if these changes are working.  How is BMI calculated?  Your height and weight are  "measured. The BMI is found from those numbers. This can be done with U.[place] or metric measurements. Note that charts and online BMI calculators are available to help you find your BMI quickly and easily without doing these calculations.  To calculate your BMI in U.[place] measurements:  Measure your weight in pounds (lb).  Multiply the number of pounds by 703.  So, for an adult who weighs 150 lb, multiply that number by 703: 150 x 703, which equals 105,450.  Measure your height in inches. Then multiply that number by itself to get a measurement called \"inches squared.\"  So, for an adult who is 70 inches tall, the \"inches squared\" measurement is 70 inches x 70 inches, which equals 4,900 inches squared.  Divide the total from step 2 (number of lb x 703) by the total from step 3 (inches squared): 105,450 ÷ 4,900 = 21.5. This is your BMI.  To calculate your BMI in metric measurements:    Measure your weight in kilograms (kg).  For this example, the weight is 70 kg.  Measure your height in meters (m). Then multiply that number by itself to get a measurement called \"meters squared.\"  So, for an adult who is 1.75 m tall, the \"meters squared\" measurement is 1.75 m x 1.75 m, which equals 3.1 meters squared.  Divide the number of kilograms (your weight) by the meters squared number. In this example: 70 ÷ 3.1 = 22.6. This is your BMI.  What do the results mean?  BMI charts are used to see if you are underweight, normal weight, overweight, or obese. The following guidelines will be used:  Underweight: BMI less than 18.5.  Normal weight: BMI between 18.5 and 24.9.  Overweight: BMI between 25 and 29.9.  Obese: BMI of 30 or above.  BMI is a tool and cannot diagnose a condition. Talk with your health care provider about what your BMI means for you. Keep these notes in mind:  Weight includes fat and muscle. Someone with a muscular build, such as an athlete, may have a BMI that is higher than 24.9. In cases like these, BMI is not a correct " measure of body fat.  If you have a BMI of 25 or higher, your provider may need to do more testing to find out if excess body fat is the cause.  BMI is measured the same way for males and females. Females usually have more body fat than males of the same height and weight.  Where to find more information  For more information about BMI, including tools to quickly find your BMI, go to:  Centers for Disease Control and Prevention: cdc.gov  American Heart Association: heart.org  National Heart, Lung, and Blood [place]: nhlbi.nih.gov  This information is not intended to replace advice given to you by your health care provider. Make sure you discuss any questions you have with your health care provider.  Document Revised: 09/07/2023 Document Reviewed: 08/31/2023  [employer] Patient Education © 2023 [employer]  Advance Directive    Advance directives are legal documents that allow you to make decisions about your health care and medical treatment in case you become unable to communicate for yourself. Advance directives let your wishes be known to family, friends, and health care providers.  Discussing and writing advance directives should happen over time rather than all at once. Advance directives can be changed and updated at any time. There are different types of advance directives, such as:  Medical power of .  Living will.  Do not resuscitate (DNR) order or do not attempt resuscitation (DNAR) order.  Health care proxy and medical power of   A health care proxy is also called a health care agent. This person is appointed to make medical decisions for you when you are unable to make decisions for yourself. Generally, people ask a trusted friend or family member to act as their proxy and represent their preferences. Make sure you have an agreement with your trusted person to act as your proxy. A proxy may have to make a medical decision on your behalf if your wishes are not known.  A medical power of  , also called a durable power of  for health care, is a legal document that names your health care proxy. Depending on the laws in your state, the document may need to be:  Signed.  Notarized.  Dated.  Copied.  Witnessed.  Incorporated into your medical record.  You may also want to appoint a trusted person to manage your money in the event you are unable to do so. This is called a durable power of  for finances. It is a separate legal document from the durable power of  for health care. You may choose your health care proxy or someone different to act as your agent in money matters.  If you do not appoint a proxy, or there is a concern that the proxy is not acting in your best interest, a court may appoint a guardian to act on your behalf.  Living will  A living will is a set of instructions that state your wishes about medical care when you cannot express them yourself. Health care providers should keep a copy of your living will in your medical record. You may want to give a copy to family members or friends. To alert caregivers in case of an emergency, you can place a card in your wallet to let them know that you have a living will and where they can find it. A living will is used if you become:  Terminally ill.  Disabled.  Unable to communicate or make decisions.  The following decisions should be included in your living will:  To use or not to use life support equipment, such as dialysis machines and breathing machines (ventilators).  Whether you want a DNR or DNAR order. This tells health care providers not to use cardiopulmonary resuscitation (CPR) if breathing or heartbeat stops.  To use or not to use tube feeding.  To be given or not to be given food and fluids.  Whether you want comfort (palliative) care when the goal becomes comfort rather than a cure.  Whether you want to donate your organs and tissues.  A living will does not give instructions for distributing your money  and property if you should pass away.  DNR or DNAR  A DNR or DNAR order is a request not to have CPR in the event that your heart stops beating or you stop breathing. If a DNR or DNAR order has not been made and shared, a health care provider will try to help any patient whose heart has stopped or who has stopped breathing. If you plan to have surgery, talk with your health care provider about how your DNR or DNAR order will be followed if problems occur.  What if I do not have an advance directive?  Some states assign family decision makers to act on your behalf if you do not have an advance directive. Each state has its own laws about advance directives. You may want to check with your health care provider, , or state representative about the laws in your state.  Summary  Advance directives are legal documents that allow you to make decisions about your health care and medical treatment in case you become unable to communicate for yourself.  The process of discussing and writing advance directives should happen over time. You can change and update advance directives at any time.  Advance directives may include a medical power of , a living will, and a DNR or DNAR order.  This information is not intended to replace advice given to you by your health care provider. Make sure you discuss any questions you have with your health care provider.  Document Revised: 09/21/2021 Document Reviewed: 09/21/2021  [employer] Patient Education © 2023 [employer]       Plan of care reviewed with patient at the conclusion of today's visit. Education was provided regarding diagnosis, management, and any prescribed or recommended OTC medications.Patient verbalizes understanding of and agreement with management plan.             Rosemary Long PA-C      Note: Part of this note may be an electronic transcription/translation of spoken language to printed text using the Dragon Dictation system.

## 2024-01-19 NOTE — PATIENT INSTRUCTIONS
"BMI for Adults  Body mass index (BMI) is a number found using a person's weight and height. BMI can help tell how much of a person's weight is made up of fat. BMI does not measure body fat directly. It is used instead of tests that directly measure body fat, which can be difficult and expensive.  What are BMI measurements used for?  BMI is useful to:  Find out if your weight puts you at higher risk for medical problems.  Help recommend changes, such as in diet and exercise. This can help you reach a healthy weight. BMI screening can be done again to see if these changes are working.  How is BMI calculated?  Your height and weight are measured. The BMI is found from those numbers. This can be done with [place] or metric measurements. Note that charts and online BMI calculators are available to help you find your BMI quickly and easily without doing these calculations.  To calculate your BMI in [place] measurements:  Measure your weight in pounds (lb).  Multiply the number of pounds by 703.  So, for an adult who weighs 150 lb, multiply that number by 703: 150 x 703, which equals 105,450.  Measure your height in inches. Then multiply that number by itself to get a measurement called \"inches squared.\"  So, for an adult who is 70 inches tall, the \"inches squared\" measurement is 70 inches x 70 inches, which equals 4,900 inches squared.  Divide the total from step 2 (number of lb x 703) by the total from step 3 (inches squared): 105,450 ÷ 4,900 = 21.5. This is your BMI.  To calculate your BMI in metric measurements:    Measure your weight in kilograms (kg).  For this example, the weight is 70 kg.  Measure your height in meters (m). Then multiply that number by itself to get a measurement called \"meters squared.\"  So, for an adult who is 1.75 m tall, the \"meters squared\" measurement is 1.75 m x 1.75 m, which equals 3.1 meters squared.  Divide the number of kilograms (your weight) by the meters squared number. In this example: 70 " ÷ 3.1 = 22.6. This is your BMI.  What do the results mean?  BMI charts are used to see if you are underweight, normal weight, overweight, or obese. The following guidelines will be used:  Underweight: BMI less than 18.5.  Normal weight: BMI between 18.5 and 24.9.  Overweight: BMI between 25 and 29.9.  Obese: BMI of 30 or above.  BMI is a tool and cannot diagnose a condition. Talk with your health care provider about what your BMI means for you. Keep these notes in mind:  Weight includes fat and muscle. Someone with a muscular build, such as an athlete, may have a BMI that is higher than 24.9. In cases like these, BMI is not a correct measure of body fat.  If you have a BMI of 25 or higher, your provider may need to do more testing to find out if excess body fat is the cause.  BMI is measured the same way for males and females. Females usually have more body fat than males of the same height and weight.  Where to find more information  For more information about BMI, including tools to quickly find your BMI, go to:  Centers for Disease Control and Prevention: cdc.gov  American Heart Association: heart.org  National Heart, Lung, and Blood [employer]: nhlbi.nih.gov  This information is not intended to replace advice given to you by your health care provider. Make sure you discuss any questions you have with your health care provider.  Document Revised: 09/07/2023 Document Reviewed: 08/31/2023  [employer] Patient Education © 2023 [employer]  Advance Directive    Advance directives are legal documents that allow you to make decisions about your health care and medical treatment in case you become unable to communicate for yourself. Advance directives let your wishes be known to family, friends, and health care providers.  Discussing and writing advance directives should happen over time rather than all at once. Advance directives can be changed and updated at any time. There are different types of advance directives,  such as:  Medical power of .  Living will.  Do not resuscitate (DNR) order or do not attempt resuscitation (DNAR) order.  Health care proxy and medical power of   A health care proxy is also called a health care agent. This person is appointed to make medical decisions for you when you are unable to make decisions for yourself. Generally, people ask a trusted friend or family member to act as their proxy and represent their preferences. Make sure you have an agreement with your trusted person to act as your proxy. A proxy may have to make a medical decision on your behalf if your wishes are not known.  A medical power of , also called a durable power of  for health care, is a legal document that names your health care proxy. Depending on the laws in your state, the document may need to be:  Signed.  Notarized.  Dated.  Copied.  Witnessed.  Incorporated into your medical record.  You may also want to appoint a trusted person to manage your money in the event you are unable to do so. This is called a durable power of  for finances. It is a separate legal document from the durable power of  for health care. You may choose your health care proxy or someone different to act as your agent in money matters.  If you do not appoint a proxy, or there is a concern that the proxy is not acting in your best interest, a court may appoint a guardian to act on your behalf.  Living will  A living will is a set of instructions that state your wishes about medical care when you cannot express them yourself. Health care providers should keep a copy of your living will in your medical record. You may want to give a copy to family members or friends. To alert caregivers in case of an emergency, you can place a card in your wallet to let them know that you have a living will and where they can find it. A living will is used if you become:  Terminally ill.  Disabled.  Unable to communicate or  make decisions.  The following decisions should be included in your living will:  To use or not to use life support equipment, such as dialysis machines and breathing machines (ventilators).  Whether you want a DNR or DNAR order. This tells health care providers not to use cardiopulmonary resuscitation (CPR) if breathing or heartbeat stops.  To use or not to use tube feeding.  To be given or not to be given food and fluids.  Whether you want comfort (palliative) care when the goal becomes comfort rather than a cure.  Whether you want to donate your organs and tissues.  A living will does not give instructions for distributing your money and property if you should pass away.  DNR or DNAR  A DNR or DNAR order is a request not to have CPR in the event that your heart stops beating or you stop breathing. If a DNR or DNAR order has not been made and shared, a health care provider will try to help any patient whose heart has stopped or who has stopped breathing. If you plan to have surgery, talk with your health care provider about how your DNR or DNAR order will be followed if problems occur.  What if I do not have an advance directive?  Some states assign family decision makers to act on your behalf if you do not have an advance directive. Each state has its own laws about advance directives. You may want to check with your health care provider, , or state representative about the laws in your state.  Summary  Advance directives are legal documents that allow you to make decisions about your health care and medical treatment in case you become unable to communicate for yourself.  The process of discussing and writing advance directives should happen over time. You can change and update advance directives at any time.  Advance directives may include a medical power of , a living will, and a DNR or DNAR order.  This information is not intended to replace advice given to you by your health care provider.  Make sure you discuss any questions you have with your health care provider.  Document Revised: 09/21/2021 Document Reviewed: 09/21/2021  [employer] Patient Education © 2023 [employer]

## 2024-01-20 DIAGNOSIS — E03.9 HYPOTHYROIDISM, UNSPECIFIED TYPE: ICD-10-CM

## 2024-01-22 RX ORDER — LEVOTHYROXINE SODIUM 0.03 MG/1
25 TABLET ORAL
Qty: 90 TABLET | Refills: 1 | Status: SHIPPED | OUTPATIENT
Start: 2024-01-22

## 2024-05-08 DIAGNOSIS — R73.09 ELEVATED GLUCOSE: ICD-10-CM

## 2024-05-08 DIAGNOSIS — Z12.5 PROSTATE CANCER SCREENING: Primary | ICD-10-CM

## 2024-05-08 DIAGNOSIS — Z13.220 LIPID SCREENING: ICD-10-CM

## 2024-05-08 DIAGNOSIS — E55.9 VITAMIN D DEFICIENCY: Primary | ICD-10-CM

## 2024-05-08 DIAGNOSIS — E55.9 VITAMIN D DEFICIENCY: ICD-10-CM

## 2024-05-08 DIAGNOSIS — E03.9 HYPOTHYROIDISM, UNSPECIFIED TYPE: ICD-10-CM

## 2024-05-08 RX ORDER — ERGOCALCIFEROL 1.25 MG/1
50000 CAPSULE ORAL WEEKLY
Qty: 4 CAPSULE | Refills: 3 | Status: SHIPPED | OUTPATIENT
Start: 2024-05-08

## 2024-05-10 ENCOUNTER — LAB (OUTPATIENT)
Dept: LAB | Facility: HOSPITAL | Age: 44
End: 2024-05-10
Payer: COMMERCIAL

## 2024-05-10 DIAGNOSIS — E55.9 VITAMIN D DEFICIENCY: ICD-10-CM

## 2024-05-10 DIAGNOSIS — R73.09 ELEVATED GLUCOSE: ICD-10-CM

## 2024-05-10 DIAGNOSIS — Z12.5 PROSTATE CANCER SCREENING: ICD-10-CM

## 2024-05-10 DIAGNOSIS — E03.9 HYPOTHYROIDISM, UNSPECIFIED TYPE: ICD-10-CM

## 2024-05-10 DIAGNOSIS — Z13.220 LIPID SCREENING: ICD-10-CM

## 2024-05-10 LAB
25(OH)D3 SERPL-MCNC: 46.2 NG/ML (ref 30–100)
ALBUMIN SERPL-MCNC: 4.4 G/DL (ref 3.5–5.2)
ALBUMIN/GLOB SERPL: 1.7 G/DL
ALP SERPL-CCNC: 68 U/L (ref 39–117)
ALT SERPL W P-5'-P-CCNC: 68 U/L (ref 1–41)
ANION GAP SERPL CALCULATED.3IONS-SCNC: 11 MMOL/L (ref 5–15)
AST SERPL-CCNC: 46 U/L (ref 1–40)
BASOPHILS # BLD AUTO: 0.06 10*3/MM3 (ref 0–0.2)
BASOPHILS NFR BLD AUTO: 1.1 % (ref 0–1.5)
BILIRUB SERPL-MCNC: 1 MG/DL (ref 0–1.2)
BUN SERPL-MCNC: 10 MG/DL (ref 6–20)
BUN/CREAT SERPL: 11.9 (ref 7–25)
CALCIUM SPEC-SCNC: 9 MG/DL (ref 8.6–10.5)
CHLORIDE SERPL-SCNC: 103 MMOL/L (ref 98–107)
CHOLEST SERPL-MCNC: 192 MG/DL (ref 0–200)
CO2 SERPL-SCNC: 25 MMOL/L (ref 22–29)
CREAT SERPL-MCNC: 0.84 MG/DL (ref 0.76–1.27)
DEPRECATED RDW RBC AUTO: 42.6 FL (ref 37–54)
EGFRCR SERPLBLD CKD-EPI 2021: 109.6 ML/MIN/1.73
EOSINOPHIL # BLD AUTO: 0.1 10*3/MM3 (ref 0–0.4)
EOSINOPHIL NFR BLD AUTO: 1.8 % (ref 0.3–6.2)
ERYTHROCYTE [DISTWIDTH] IN BLOOD BY AUTOMATED COUNT: 12.7 % (ref 12.3–15.4)
GLOBULIN UR ELPH-MCNC: 2.6 GM/DL
GLUCOSE SERPL-MCNC: 83 MG/DL (ref 65–99)
HBA1C MFR BLD: 5.2 % (ref 4.8–5.6)
HCT VFR BLD AUTO: 43.5 % (ref 37.5–51)
HDLC SERPL-MCNC: 92 MG/DL (ref 40–60)
HGB BLD-MCNC: 15.2 G/DL (ref 13–17.7)
IMM GRANULOCYTES # BLD AUTO: 0.01 10*3/MM3 (ref 0–0.05)
IMM GRANULOCYTES NFR BLD AUTO: 0.2 % (ref 0–0.5)
LDLC SERPL CALC-MCNC: 89 MG/DL (ref 0–100)
LDLC/HDLC SERPL: 0.96 {RATIO}
LYMPHOCYTES # BLD AUTO: 1.32 10*3/MM3 (ref 0.7–3.1)
LYMPHOCYTES NFR BLD AUTO: 23.6 % (ref 19.6–45.3)
MCH RBC QN AUTO: 32.8 PG (ref 26.6–33)
MCHC RBC AUTO-ENTMCNC: 34.9 G/DL (ref 31.5–35.7)
MCV RBC AUTO: 94 FL (ref 79–97)
MONOCYTES # BLD AUTO: 0.62 10*3/MM3 (ref 0.1–0.9)
MONOCYTES NFR BLD AUTO: 11.1 % (ref 5–12)
NEUTROPHILS NFR BLD AUTO: 3.49 10*3/MM3 (ref 1.7–7)
NEUTROPHILS NFR BLD AUTO: 62.2 % (ref 42.7–76)
NRBC BLD AUTO-RTO: 0 /100 WBC (ref 0–0.2)
PLATELET # BLD AUTO: 195 10*3/MM3 (ref 140–450)
PMV BLD AUTO: 10.6 FL (ref 6–12)
POTASSIUM SERPL-SCNC: 4 MMOL/L (ref 3.5–5.2)
PROT SERPL-MCNC: 7 G/DL (ref 6–8.5)
PSA SERPL-MCNC: 0.63 NG/ML (ref 0–4)
RBC # BLD AUTO: 4.63 10*6/MM3 (ref 4.14–5.8)
SODIUM SERPL-SCNC: 139 MMOL/L (ref 136–145)
T3FREE SERPL-MCNC: 3.74 PG/ML (ref 2–4.4)
T4 FREE SERPL-MCNC: 1.62 NG/DL (ref 0.93–1.7)
TRIGL SERPL-MCNC: 57 MG/DL (ref 0–150)
TSH SERPL DL<=0.05 MIU/L-ACNC: 3.01 UIU/ML (ref 0.27–4.2)
VIT B12 BLD-MCNC: 576 PG/ML (ref 211–946)
VLDLC SERPL-MCNC: 11 MG/DL (ref 5–40)
WBC NRBC COR # BLD AUTO: 5.6 10*3/MM3 (ref 3.4–10.8)

## 2024-05-10 PROCEDURE — 80050 GENERAL HEALTH PANEL: CPT

## 2024-05-10 PROCEDURE — 83036 HEMOGLOBIN GLYCOSYLATED A1C: CPT

## 2024-05-10 PROCEDURE — 80061 LIPID PANEL: CPT

## 2024-05-10 PROCEDURE — 82306 VITAMIN D 25 HYDROXY: CPT

## 2024-05-10 PROCEDURE — 84481 FREE ASSAY (FT-3): CPT

## 2024-05-10 PROCEDURE — 84439 ASSAY OF FREE THYROXINE: CPT

## 2024-05-10 PROCEDURE — G0103 PSA SCREENING: HCPCS

## 2024-05-10 PROCEDURE — 82607 VITAMIN B-12: CPT

## 2024-05-14 ENCOUNTER — OFFICE VISIT (OUTPATIENT)
Dept: INTERNAL MEDICINE | Facility: CLINIC | Age: 44
End: 2024-05-14
Payer: COMMERCIAL

## 2024-05-14 VITALS
HEART RATE: 84 BPM | SYSTOLIC BLOOD PRESSURE: 132 MMHG | WEIGHT: 225.8 LBS | TEMPERATURE: 98.2 F | HEIGHT: 72 IN | BODY MASS INDEX: 30.58 KG/M2 | DIASTOLIC BLOOD PRESSURE: 88 MMHG

## 2024-05-14 DIAGNOSIS — R03.0 ELEVATED BLOOD PRESSURE READING: ICD-10-CM

## 2024-05-14 DIAGNOSIS — L71.9 ROSACEA: Chronic | ICD-10-CM

## 2024-05-14 DIAGNOSIS — E55.9 VITAMIN D DEFICIENCY: ICD-10-CM

## 2024-05-14 DIAGNOSIS — E03.9 HYPOTHYROIDISM (ACQUIRED): Primary | ICD-10-CM

## 2024-05-14 DIAGNOSIS — Z78.9 ALCOHOL USE: Chronic | ICD-10-CM

## 2024-05-14 DIAGNOSIS — J30.9 ALLERGIC RHINITIS, UNSPECIFIED SEASONALITY, UNSPECIFIED TRIGGER: ICD-10-CM

## 2024-05-14 DIAGNOSIS — K57.92 DIVERTICULITIS: ICD-10-CM

## 2024-05-14 PROCEDURE — 99214 OFFICE O/P EST MOD 30 MIN: CPT | Performed by: PHYSICIAN ASSISTANT

## 2024-05-14 NOTE — PROGRESS NOTES
[facility] Internal Medicine    Arie Hayden  1980   [id number]      Patient Care Team:  Rosemary Long PA-C as PCP - General (Internal Medicine)  Mitchel Beck MD as Consulting Physician (Colon and Rectal Surgery)  Kendra Arteaga MD as Consulting Physician (Dermatology)    Chief Complaint::   Chief Complaint   Patient presents with    Vitamin D Deficiency     3 month follow-up        HPI  Arie is a 44-year-old male date of birth 1980 who presents today for 6-month follow-up.  Past medical history significant for hypothyroidism, vitamin D deficiency, history of abscess of sigmoid colon status post colectomy, rosacea.   He did not go to the gym for over a month, started back yesterday.  Drinks vodka and smokes most nights.  He is playing pickleball with kids.  He underwent ventral incisional hernia repair with Dr. Catalan 12/4/2023.  Procedure was performed without complication.  Colon resection performed on 5/6/2019 with Dr. Beck.  He denies constipation or diarrhea.  Denies abdominal pain.    Recent labs show improved vitamin D levels at 50, normal thyroid, lipid panel within range, and elevated liver function.  He denies chest pain, shortness of breath, palpitations, or headaches.        Patient Active Problem List   Diagnosis    Left lower quadrant pain    Sepsis     Tobacco abuse    Abscess of sigmoid colon due to diverticulitis    Allergic rhinitis    Fatigue    Snores    Diverticulitis    Sleep apnea    Rosacea    Alcohol use    Encounter for preventive health examination    Lipid screening    Screening PSA (prostate specific antigen)    Elevated blood pressure reading    Low back pain with right-sided sciatica    Hypothyroidism (acquired)    Vitamin D deficiency    Encounter for vitamin deficiency screening    Overweight    Routine medical exam        Past Medical History:   Diagnosis Date    Allergic rhinitis     Colon polyp     COVID     Disease of thyroid gland      HYPO    Diverticulitis 01/15/2019    Diverticulosis     Fatigue     Rosacea     Scoliosis     Sleep apnea     DOES NOT USE CPAP       Past Surgical History:   Procedure Laterality Date    APPENDECTOMY      COLON RESECTION N/A 2019    Procedure: LOWER ANTERIOR RESECTION, APPENDECTOMY, TAKEDOWN OF SPLENIC FLEXURE, EXCISION AND CLOSURE OF COLOCECAL FISTULA, UMBILICAL HERNIA REPAIR, AND PROCTOSCOPY;  Surgeon: Mitchel Beck MD;  Location:  [facility];  Service: General    COLONOSCOPY      CT ABSCESS DRAIN CYST ASP      X2    TONSILLECTOMY  1987    VASECTOMY  2011    VENTRAL/INCISIONAL HERNIA REPAIR N/A 2023    Procedure: OPEN INCISIONAL HERNIA REPAIR WITH MESH;  Surgeon: Royer Catalan MD;  Location:  [facility];  Service: General;  Laterality: N/A;    WISDOM TOOTH EXTRACTION         Family History   Problem Relation Age of Onset    Factor V Leiden deficiency Father     Suicidality Father     Factor IX deficiency Father     Breast cancer Maternal Grandmother     Lung cancer Maternal Grandfather     Factor V Leiden deficiency Paternal Grandfather     Prostate cancer Paternal Grandfather     Hyperlipidemia Mother     Hypothyroidism Mother     No Known Problems Sister     No Known Problems Brother     No Known Problems Daughter     No Known Problems Daughter     Hyperlipidemia Maternal Uncle        Social History     Socioeconomic History    Marital status:    Tobacco Use    Smoking status: Former     Current packs/day: 0.00     Average packs/day: 0.3 packs/day for 19.9 years (5.0 ttl pk-yrs)     Types: Cigarettes     Start date: 2004     Quit date: 2023     Years since quittin.4    Smokeless tobacco: Former    Tobacco comments:     occasionally    Vaping Use    Vaping status: Never Used   Substance and Sexual Activity    Alcohol use: Yes     Alcohol/week: 10.0 standard drinks of alcohol     Types: 10 Drinks containing 0.5 oz of alcohol per week     Comment: 8-10/week    Drug use: No  "   Sexual activity: Yes     Partners: Female     Birth control/protection: Vasectomy, Hysterectomy       Allergies   Allergen Reactions    Pollen Extract Other (See Comments)     Sinus related       Review of Systems   Constitutional:  Negative for activity change, appetite change, diaphoresis, fatigue, unexpected weight gain and unexpected weight loss.   HENT:  Negative for hearing loss.    Eyes:  Negative for visual disturbance.   Respiratory:  Negative for chest tightness and shortness of breath.    Cardiovascular:  Negative for chest pain, palpitations and leg swelling.   Gastrointestinal:  Negative for abdominal pain, blood in stool, GERD and indigestion.   Endocrine: Negative for cold intolerance and heat intolerance.   Genitourinary:  Negative for dysuria and hematuria.   Musculoskeletal:  Negative for arthralgias and myalgias.   Skin:  Positive for color change. Negative for skin lesions.   Neurological:  Negative for tremors, seizures, syncope, speech difficulty, weakness, headache, memory problem and confusion.   Hematological:  Does not bruise/bleed easily.   Psychiatric/Behavioral:  Negative for sleep disturbance and depressed mood. The patient is not nervous/anxious.         Vital Signs  Vitals:    05/14/24 1035 05/14/24 1343   BP: 140/92 132/88   BP Location: Right arm    Patient Position: Sitting    Cuff Size: Adult    Pulse: 84    Temp: 98.2 °F (36.8 °C)    TempSrc: Infrared    Weight: 102 kg (225 lb 12.8 oz)    Height: 182.9 cm (72.01\")    PainSc: 0-No pain      Body mass index is 30.62 kg/m².        Advance Care Planning   ACP discussion was held with the patient during this visit. Patient does not have an advance directive, information provided.       Current Outpatient Medications:     levothyroxine (SYNTHROID, LEVOTHROID) 25 MCG tablet, TAKE 1 TABLET BY MOUTH EVERY MORNING., Disp: 90 tablet, Rfl: 1    Seysara 150 MG tablet, Take 1 tablet by mouth 3 (Three) Times a Week., Disp: , Rfl:     vitamin " D (ERGOCALCIFEROL) 1.25 MG (13727 UT) capsule capsule, TAKE 1 CAPSULE BY MOUTH 1 (ONE) TIME PER WEEK., Disp: 4 capsule, Rfl: 3    Physical Exam  Vitals reviewed.   Constitutional:       Appearance: Normal appearance. He is well-developed.   HENT:      Head: Normocephalic and atraumatic.      Right Ear: Hearing, tympanic membrane, ear canal and external ear normal.      Left Ear: Hearing, tympanic membrane, ear canal and external ear normal.      Nose: Nose normal.      Mouth/Throat:      Mouth: Mucous membranes are moist.      Pharynx: Oropharynx is clear. Uvula midline. No posterior oropharyngeal erythema.   Eyes:      General: Lids are normal.      Conjunctiva/sclera: Conjunctivae normal.      Pupils: Pupils are equal, round, and reactive to light.   Cardiovascular:      Rate and Rhythm: Normal rate and regular rhythm.      Heart sounds: Normal heart sounds.   Pulmonary:      Effort: Pulmonary effort is normal.      Breath sounds: Normal breath sounds.   Abdominal:      General: Bowel sounds are normal.      Palpations: Abdomen is soft.      Tenderness: There is no right CVA tenderness or left CVA tenderness.   Musculoskeletal:         General: Normal range of motion.      Cervical back: Full passive range of motion without pain, normal range of motion and neck supple.   Skin:     General: Skin is warm and dry.      Comments: flushed   Neurological:      Mental Status: He is alert and oriented to person, place, and time.      Deep Tendon Reflexes: Reflexes are normal and symmetric.   Psychiatric:         Speech: Speech normal.         Behavior: Behavior normal.         Thought Content: Thought content normal.         Judgment: Judgment normal.          ACE III MINI            Results Review:    Recent Results (from the past 672 hour(s))   Comprehensive Metabolic Panel    Collection Time: 05/10/24 11:55 AM    Specimen: Blood   Result Value Ref Range    Glucose 83 65 - 99 mg/dL    BUN 10 6 - 20 mg/dL    Creatinine  0.84 0.76 - 1.27 mg/dL    Sodium 139 136 - 145 mmol/L    Potassium 4.0 3.5 - 5.2 mmol/L    Chloride 103 98 - 107 mmol/L    CO2 25.0 22.0 - 29.0 mmol/L    Calcium 9.0 8.6 - 10.5 mg/dL    Total Protein 7.0 6.0 - 8.5 g/dL    Albumin 4.4 3.5 - 5.2 g/dL    ALT (SGPT) 68 (H) 1 - 41 U/L    AST (SGOT) 46 (H) 1 - 40 U/L    Alkaline Phosphatase 68 39 - 117 U/L    Total Bilirubin 1.0 0.0 - 1.2 mg/dL    Globulin 2.6 gm/dL    A/G Ratio 1.7 g/dL    BUN/Creatinine Ratio 11.9 7.0 - 25.0    Anion Gap 11.0 5.0 - 15.0 mmol/L    eGFR 109.6 >60.0 mL/min/1.73   Hemoglobin A1c    Collection Time: 05/10/24 11:55 AM    Specimen: Blood   Result Value Ref Range    Hemoglobin A1C 5.20 4.80 - 5.60 %   TSH    Collection Time: 05/10/24 11:55 AM    Specimen: Blood   Result Value Ref Range    TSH 3.010 0.270 - 4.200 uIU/mL   T4, Free    Collection Time: 05/10/24 11:55 AM    Specimen: Blood   Result Value Ref Range    Free T4 1.62 0.93 - 1.70 ng/dL   T3, Free    Collection Time: 05/10/24 11:55 AM    Specimen: Blood   Result Value Ref Range    T3, Free 3.74 2.00 - 4.40 pg/mL   Vitamin B12    Collection Time: 05/10/24 11:55 AM    Specimen: Blood   Result Value Ref Range    Vitamin B-12 576 211 - 946 pg/mL   Vitamin D,25-Hydroxy    Collection Time: 05/10/24 11:55 AM    Specimen: Blood   Result Value Ref Range    25 Hydroxy, Vitamin D 46.2 30.0 - 100.0 ng/ml   Lipid Panel    Collection Time: 05/10/24 11:55 AM    Specimen: Blood   Result Value Ref Range    Total Cholesterol 192 0 - 200 mg/dL    Triglycerides 57 0 - 150 mg/dL    HDL Cholesterol 92 (H) 40 - 60 mg/dL    LDL Cholesterol  89 0 - 100 mg/dL    VLDL Cholesterol 11 5 - 40 mg/dL    LDL/HDL Ratio 0.96    PSA Screen    Collection Time: 05/10/24 11:55 AM    Specimen: Blood   Result Value Ref Range    PSA 0.634 0.000 - 4.000 ng/mL   CBC Auto Differential    Collection Time: 05/10/24 11:55 AM    Specimen: Blood   Result Value Ref Range    WBC 5.60 3.40 - 10.80 10*3/mm3    RBC 4.63 4.14 - 5.80 10*6/mm3     Hemoglobin 15.2 13.0 - 17.7 g/dL    Hematocrit 43.5 37.5 - 51.0 %    MCV 94.0 79.0 - 97.0 fL    MCH 32.8 26.6 - 33.0 pg    MCHC 34.9 31.5 - 35.7 g/dL    RDW 12.7 12.3 - 15.4 %    RDW-SD 42.6 37.0 - 54.0 fl    MPV 10.6 6.0 - 12.0 fL    Platelets 195 140 - 450 10*3/mm3    Neutrophil % 62.2 42.7 - 76.0 %    Lymphocyte % 23.6 19.6 - 45.3 %    Monocyte % 11.1 5.0 - 12.0 %    Eosinophil % 1.8 0.3 - 6.2 %    Basophil % 1.1 0.0 - 1.5 %    Immature Grans % 0.2 0.0 - 0.5 %    Neutrophils, Absolute 3.49 1.70 - 7.00 10*3/mm3    Lymphocytes, Absolute 1.32 0.70 - 3.10 10*3/mm3    Monocytes, Absolute 0.62 0.10 - 0.90 10*3/mm3    Eosinophils, Absolute 0.10 0.00 - 0.40 10*3/mm3    Basophils, Absolute 0.06 0.00 - 0.20 10*3/mm3    Immature Grans, Absolute 0.01 0.00 - 0.05 10*3/mm3    nRBC 0.0 0.0 - 0.2 /100 WBC     Procedures    Medication Review: Medications reviewed and noted    Social History     Socioeconomic History    Marital status:    Tobacco Use    Smoking status: Former     Current packs/day: 0.00     Average packs/day: 0.3 packs/day for 19.9 years (5.0 ttl pk-yrs)     Types: Cigarettes     Start date: 2004     Quit date: 2023     Years since quittin.4    Smokeless tobacco: Former    Tobacco comments:     occasionally    Vaping Use    Vaping status: Never Used   Substance and Sexual Activity    Alcohol use: Yes     Alcohol/week: 10.0 standard drinks of alcohol     Types: 10 Drinks containing 0.5 oz of alcohol per week     Comment: 8-10/week    Drug use: No    Sexual activity: Yes     Partners: Female     Birth control/protection: Vasectomy, Hysterectomy        Assessment/Plan:    Diagnoses and all orders for this visit:    1. Hypothyroidism (acquired) (Primary)  Overview:  Continue levothyroxine 25 mcg daily.      2. Allergic rhinitis, unspecified seasonality, unspecified trigger  Overview:  Chronic allergic rhinitis, unspecified seasonality, unspecified trigger.  Was taking Claritin but discontinued  "for now.      3. Elevated blood pressure reading  Overview:  Try and cut back on alcohol and tobacco.  Repeat blood pressure in the office 132/88.      4. Vitamin D deficiency  Overview:  Continue vitamin D 50,000 units weekly.      5. Diverticulitis  Overview:  History of abscess due to diverticulitis.  Has completed colonoscopy with Dr. Beck.        6. Alcohol use  Overview:  Reports drinking 3-4drinks 5 nights a week.  No hx of w/d or seizures.  He is trying to cut back.      7. Rosacea  Overview:  Petra Chasityara 150mg daily.           Patient Instructions   BMI for Adults  Body mass index (BMI) is a number found using a person's weight and height. BMI can help tell how much of a person's weight is made up of fat. BMI does not measure body fat directly. It is used instead of tests that directly measure body fat, which can be difficult and expensive.  What are BMI measurements used for?  BMI is useful to:  Find out if your weight puts you at higher risk for medical problems.  Help recommend changes, such as in diet and exercise. This can help you reach a healthy weight. BMI screening can be done again to see if these changes are working.  How is BMI calculated?  Your height and weight are measured. The BMI is found from those numbers. This can be done with U[place] or metric measurements. Note that charts and online BMI calculators are available to help you find your BMI quickly and easily without doing these calculations.  To calculate your BMI in [place] measurements:  Measure your weight in pounds (lb).  Multiply the number of pounds by 703.  So, for an adult who weighs 150 lb, multiply that number by 703: 150 x 703, which equals 105,450.  Measure your height in inches. Then multiply that number by itself to get a measurement called \"inches squared.\"  So, for an adult who is 70 inches tall, the \"inches squared\" measurement is 70 inches x 70 inches, which equals 4,900 inches squared.  Divide the total from " "step 2 (number of lb x 703) by the total from step 3 (inches squared): 105,450 ÷ 4,900 = 21.5. This is your BMI.  To calculate your BMI in metric measurements:    Measure your weight in kilograms (kg).  For this example, the weight is 70 kg.  Measure your height in meters (m). Then multiply that number by itself to get a measurement called \"meters squared.\"  So, for an adult who is 1.75 m tall, the \"meters squared\" measurement is 1.75 m x 1.75 m, which equals 3.1 meters squared.  Divide the number of kilograms (your weight) by the meters squared number. In this example: 70 ÷ 3.1 = 22.6. This is your BMI.  What do the results mean?  BMI charts are used to see if you are underweight, normal weight, overweight, or obese. The following guidelines will be used:  Underweight: BMI less than 18.5.  Normal weight: BMI between 18.5 and 24.9.  Overweight: BMI between 25 and 29.9.  Obese: BMI of 30 or above.  BMI is a tool and cannot diagnose a condition. Talk with your health care provider about what your BMI means for you. Keep these notes in mind:  Weight includes fat and muscle. Someone with a muscular build, such as an athlete, may have a BMI that is higher than 24.9. In cases like these, BMI is not a correct measure of body fat.  If you have a BMI of 25 or higher, your provider may need to do more testing to find out if excess body fat is the cause.  BMI is measured the same way for males and females. Females usually have more body fat than males of the same height and weight.  Where to find more information  For more information about BMI, including tools to quickly find your BMI, go to:  Centers for Disease Control and Prevention: cdc.gov  American Heart Association: heart.org  National Heart, Lung, and Blood [place]: nhlbi.nih.gov  This information is not intended to replace advice given to you by your health care provider. Make sure you discuss any questions you have with your health care provider.  Document " Revised: 09/07/2023 Document Reviewed: 08/31/2023  [employer] Patient Education © 2024 [employer]  Advance Directive    Advance directives are legal documents that allow you to make decisions about your health care and medical treatment in case you become unable to communicate for yourself. Advance directives let your wishes be known to family, friends, and health care providers.  Discussing and writing advance directives should happen over time rather than all at once. Advance directives can be changed and updated at any time. There are different types of advance directives, such as:  Medical power of .  Living will.  Do not resuscitate (DNR) order or do not attempt resuscitation (DNAR) order.  Health care proxy and medical power of   A health care proxy is also called a health care agent. This person is appointed to make medical decisions for you when you are unable to make decisions for yourself. Generally, people ask a trusted friend or family member to act as their proxy and represent their preferences. Make sure you have an agreement with your trusted person to act as your proxy. A proxy may have to make a medical decision on your behalf if your wishes are not known.  A medical power of , also called a durable power of  for health care, is a legal document that names your health care proxy. Depending on the laws in your state, the document may need to be:  Signed.  Notarized.  Dated.  Copied.  Witnessed.  Incorporated into your medical record.  You may also want to appoint a trusted person to manage your money in the event you are unable to do so. This is called a durable power of  for finances. It is a separate legal document from the durable power of  for health care. You may choose your health care proxy or someone different to act as your agent in money matters.  If you do not appoint a proxy, or there is a concern that the proxy is not acting in your best  interest, a court may appoint a guardian to act on your behalf.  Living will  A living will is a set of instructions that state your wishes about medical care when you cannot express them yourself. Health care providers should keep a copy of your living will in your medical record. You may want to give a copy to family members or friends. To alert caregivers in case of an emergency, you can place a card in your wallet to let them know that you have a living will and where they can find it. A living will is used if you become:  Terminally ill.  Disabled.  Unable to communicate or make decisions.  The following decisions should be included in your living will:  To use or not to use life support equipment, such as dialysis machines and breathing machines (ventilators).  Whether you want a DNR or DNAR order. This tells health care providers not to use cardiopulmonary resuscitation (CPR) if breathing or heartbeat stops.  To use or not to use tube feeding.  To be given or not to be given food and fluids.  Whether you want comfort (palliative) care when the goal becomes comfort rather than a cure.  Whether you want to donate your organs and tissues.  A living will does not give instructions for distributing your money and property if you should pass away.  DNR or DNAR  A DNR or DNAR order is a request not to have CPR in the event that your heart stops beating or you stop breathing. If a DNR or DNAR order has not been made and shared, a health care provider will try to help any patient whose heart has stopped or who has stopped breathing. If you plan to have surgery, talk with your health care provider about how your DNR or DNAR order will be followed if problems occur.  What if I do not have an advance directive?  Some states assign family decision makers to act on your behalf if you do not have an advance directive. Each state has its own laws about advance directives. You may want to check with your health care provider,  , or state representative about the laws in your state.  Summary  Advance directives are legal documents that allow you to make decisions about your health care and medical treatment in case you become unable to communicate for yourself.  The process of discussing and writing advance directives should happen over time. You can change and update advance directives at any time.  Advance directives may include a medical power of , a living will, and a DNR or DNAR order.  This information is not intended to replace advice given to you by your health care provider. Make sure you discuss any questions you have with your health care provider.  Document Revised: 09/21/2021 Document Reviewed: 09/21/2021  [employer] Patient Education © 2024 [employer]       Plan of care reviewed with patient at the conclusion of today's visit. Education was provided regarding diagnosis, management, and any prescribed or recommended OTC medications.Patient verbalizes understanding of and agreement with management plan.         I spent 30 minutes caring for Arie on this date of service:preparing for the visit, reviewing tests, obtaining and/or reviewing a separately obtained history, performing a medically appropriate examination and/or evaluation , counseling and educating the patient/family/caregiver, ordering medications, tests, or procedures, referring and communicating with other health care professionals , and documenting information in the medical record    Rosemary Long PA-C      Note: Part of this note may be an electronic transcription/translation of spoken language to printed text using the Dragon Dictation system.

## 2024-05-14 NOTE — PATIENT INSTRUCTIONS
"BMI for Adults  Body mass index (BMI) is a number found using a person's weight and height. BMI can help tell how much of a person's weight is made up of fat. BMI does not measure body fat directly. It is used instead of tests that directly measure body fat, which can be difficult and expensive.  What are BMI measurements used for?  BMI is useful to:  Find out if your weight puts you at higher risk for medical problems.  Help recommend changes, such as in diet and exercise. This can help you reach a healthy weight. BMI screening can be done again to see if these changes are working.  How is BMI calculated?  Your height and weight are measured. The BMI is found from those numbers. This can be done with U.[place] or metric measurements. Note that charts and online BMI calculators are available to help you find your BMI quickly and easily without doing these calculations.  To calculate your BMI in U.S[place] measurements:  Measure your weight in pounds (lb).  Multiply the number of pounds by 703.  So, for an adult who weighs 150 lb, multiply that number by 703: 150 x 703, which equals 105,450.  Measure your height in inches. Then multiply that number by itself to get a measurement called \"inches squared.\"  So, for an adult who is 70 inches tall, the \"inches squared\" measurement is 70 inches x 70 inches, which equals 4,900 inches squared.  Divide the total from step 2 (number of lb x 703) by the total from step 3 (inches squared): 105,450 ÷ 4,900 = 21.5. This is your BMI.  To calculate your BMI in metric measurements:    Measure your weight in kilograms (kg).  For this example, the weight is 70 kg.  Measure your height in meters (m). Then multiply that number by itself to get a measurement called \"meters squared.\"  So, for an adult who is 1.75 m tall, the \"meters squared\" measurement is 1.75 m x 1.75 m, which equals 3.1 meters squared.  Divide the number of kilograms (your weight) by the meters squared number. In this example: 70 " ÷ 3.1 = 22.6. This is your BMI.  What do the results mean?  BMI charts are used to see if you are underweight, normal weight, overweight, or obese. The following guidelines will be used:  Underweight: BMI less than 18.5.  Normal weight: BMI between 18.5 and 24.9.  Overweight: BMI between 25 and 29.9.  Obese: BMI of 30 or above.  BMI is a tool and cannot diagnose a condition. Talk with your health care provider about what your BMI means for you. Keep these notes in mind:  Weight includes fat and muscle. Someone with a muscular build, such as an athlete, may have a BMI that is higher than 24.9. In cases like these, BMI is not a correct measure of body fat.  If you have a BMI of 25 or higher, your provider may need to do more testing to find out if excess body fat is the cause.  BMI is measured the same way for males and females. Females usually have more body fat than males of the same height and weight.  Where to find more information  For more information about BMI, including tools to quickly find your BMI, go to:  Centers for Disease Control and Prevention: cdc.gov  American Heart Association: heart.org  National Heart, Lung, [facility] [place]: nhlbi.nih.gov  This information is not intended to replace advice given to you by your health care provider. Make sure you discuss any questions you have with your health care provider.  Document Revised: 09/07/2023 Document Reviewed: 08/31/2023  [employer] Patient Education © 2024 [employer]  Advance Directive    Advance directives are legal documents that allow you to make decisions about your health care and medical treatment in case you become unable to communicate for yourself. Advance directives let your wishes be known to family, friends, and health care providers.  Discussing and writing advance directives should happen over time rather than all at once. Advance directives can be changed and updated at any time. There are different types of advance directives,  such as:  Medical power of .  Living will.  Do not resuscitate (DNR) order or do not attempt resuscitation (DNAR) order.  Health care proxy and medical power of   A health care proxy is also called a health care agent. This person is appointed to make medical decisions for you when you are unable to make decisions for yourself. Generally, people ask a trusted friend or family member to act as their proxy and represent their preferences. Make sure you have an agreement with your trusted person to act as your proxy. A proxy may have to make a medical decision on your behalf if your wishes are not known.  A medical power of , also called a durable power of  for health care, is a legal document that names your health care proxy. Depending on the laws in your state, the document may need to be:  Signed.  Notarized.  Dated.  Copied.  Witnessed.  Incorporated into your medical record.  You may also want to appoint a trusted person to manage your money in the event you are unable to do so. This is called a durable power of  for finances. It is a separate legal document from the durable power of  for health care. You may choose your health care proxy or someone different to act as your agent in money matters.  If you do not appoint a proxy, or there is a concern that the proxy is not acting in your best interest, a court may appoint a guardian to act on your behalf.  Living will  A living will is a set of instructions that state your wishes about medical care when you cannot express them yourself. Health care providers should keep a copy of your living will in your medical record. You may want to give a copy to family members or friends. To alert caregivers in case of an emergency, you can place a card in your wallet to let them know that you have a living will and where they can find it. A living will is used if you become:  Terminally ill.  Disabled.  Unable to communicate or  make decisions.  The following decisions should be included in your living will:  To use or not to use life support equipment, such as dialysis machines and breathing machines (ventilators).  Whether you want a DNR or DNAR order. This tells health care providers not to use cardiopulmonary resuscitation (CPR) if breathing or heartbeat stops.  To use or not to use tube feeding.  To be given or not to be given food and fluids.  Whether you want comfort (palliative) care when the goal becomes comfort rather than a cure.  Whether you want to donate your organs and tissues.  A living will does not give instructions for distributing your money and property if you should pass away.  DNR or DNAR  A DNR or DNAR order is a request not to have CPR in the event that your heart stops beating or you stop breathing. If a DNR or DNAR order has not been made and shared, a health care provider will try to help any patient whose heart has stopped or who has stopped breathing. If you plan to have surgery, talk with your health care provider about how your DNR or DNAR order will be followed if problems occur.  What if I do not have an advance directive?  Some states assign family decision makers to act on your behalf if you do not have an advance directive. Each state has its own laws about advance directives. You may want to check with your health care provider, , or state representative about the laws in your state.  Summary  Advance directives are legal documents that allow you to make decisions about your health care and medical treatment in case you become unable to communicate for yourself.  The process of discussing and writing advance directives should happen over time. You can change and update advance directives at any time.  Advance directives may include a medical power of , a living will, and a DNR or DNAR order.  This information is not intended to replace advice given to you by your health care provider.  Make sure you discuss any questions you have with your health care provider.  Document Revised: 09/21/2021 Document Reviewed: 09/21/2021  [employer] Patient Education © 2024 [employer]

## 2024-06-27 DIAGNOSIS — E03.9 HYPOTHYROIDISM, UNSPECIFIED TYPE: ICD-10-CM

## 2024-06-27 RX ORDER — LEVOTHYROXINE SODIUM 0.03 MG/1
25 TABLET ORAL
Qty: 90 TABLET | Refills: 1 | Status: SHIPPED | OUTPATIENT
Start: 2024-06-27

## 2024-07-16 ENCOUNTER — HOSPITAL ENCOUNTER (OUTPATIENT)
Dept: GENERAL RADIOLOGY | Facility: HOSPITAL | Age: 44
Discharge: HOME | End: 2024-07-16
Admitting: NURSE PRACTITIONER
Payer: COMMERCIAL

## 2024-07-16 ENCOUNTER — OFFICE VISIT (OUTPATIENT)
Dept: INTERNAL MEDICINE | Facility: CLINIC | Age: 44
End: 2024-07-16
Payer: COMMERCIAL

## 2024-07-16 VITALS
SYSTOLIC BLOOD PRESSURE: 140 MMHG | DIASTOLIC BLOOD PRESSURE: 90 MMHG | HEART RATE: 78 BPM | OXYGEN SATURATION: 97 % | HEIGHT: 72 IN | WEIGHT: 229.6 LBS | BODY MASS INDEX: 31.1 KG/M2 | TEMPERATURE: 98 F

## 2024-07-16 DIAGNOSIS — M54.50 LUMBAR BACK PAIN: Primary | ICD-10-CM

## 2024-07-16 DIAGNOSIS — M54.40 BACK PAIN OF LUMBAR REGION WITH SCIATICA: ICD-10-CM

## 2024-07-16 DIAGNOSIS — M54.50 LUMBAR BACK PAIN: ICD-10-CM

## 2024-07-16 PROCEDURE — 96372 THER/PROPH/DIAG INJ SC/IM: CPT | Performed by: NURSE PRACTITIONER

## 2024-07-16 PROCEDURE — 72100 X-RAY EXAM L-S SPINE 2/3 VWS: CPT

## 2024-07-16 PROCEDURE — 99213 OFFICE O/P EST LOW 20 MIN: CPT | Performed by: NURSE PRACTITIONER

## 2024-07-16 RX ORDER — TRIAMCINOLONE ACETONIDE 40 MG/ML
80 INJECTION, SUSPENSION INTRA-ARTICULAR; INTRAMUSCULAR ONCE
Status: COMPLETED | OUTPATIENT
Start: 2024-07-16 | End: 2024-07-16

## 2024-07-16 RX ADMIN — TRIAMCINOLONE ACETONIDE 80 MG: 40 INJECTION, SUSPENSION INTRA-ARTICULAR; INTRAMUSCULAR at 10:45

## 2024-07-16 NOTE — ASSESSMENT & PLAN NOTE
-Triamcinolone injection in office today.  - He is not interested in physical therapy, he reports he knows what he needs to do to stretch his back out.  - OTC NSAIDs for pain, apply heat, instructed not to lift any weights at this time.  - X-ray ordered  - Instructed to return to clinic in 6 to 8 weeks if pain is not improved.

## 2024-07-16 NOTE — PROGRESS NOTES
Office Note     Name: Arie Hayden    : 1980     MRN: [id number]     Chief Complaint  Back Pain    Subjective     History of Present Illness:  Arie Hayden is a 44 y.o. male who presents today for lower back pain.  Says he was reaching down picking something up off the floor and felt a pop in his lower back and then had difficulty standing and straightening up 2 to 3 days ago.  He has sharp intermittent pain radiating down full right buttock into right leg.  Taking Advil with little relief.  Denies bowel or bladder changes.    Says he tweaked his back about a year ago and had been doing pretty good until this incident.        Past Medical History:   Past Medical History:   Diagnosis Date    Allergic rhinitis     Colon polyp     COVID     Disease of thyroid gland     HYPO    Diverticulitis 01/15/2019    Diverticulosis     Fatigue     Rosacea     Scoliosis     Sleep apnea     DOES NOT USE CPAP       Past Surgical History:   Past Surgical History:   Procedure Laterality Date    APPENDECTOMY      COLON RESECTION N/A 2019    Procedure: LOWER ANTERIOR RESECTION, APPENDECTOMY, TAKEDOWN OF SPLENIC FLEXURE, EXCISION AND CLOSURE OF COLOCECAL FISTULA, UMBILICAL HERNIA REPAIR, AND PROCTOSCOPY;  Surgeon: Mitchel Beck MD;  Location:  [facility];  Service: General    COLONOSCOPY      CT ABSCESS DRAIN CYST ASP      X2    TONSILLECTOMY  1987    VASECTOMY  2011    VENTRAL/INCISIONAL HERNIA REPAIR N/A 2023    Procedure: OPEN INCISIONAL HERNIA REPAIR WITH MESH;  Surgeon: Royer Catalan MD;  Location:  [facility];  Service: General;  Laterality: N/A;    WISDOM TOOTH EXTRACTION         Family History:   Family History   Problem Relation Age of Onset    Factor V Leiden deficiency Father     Suicidality Father     Factor IX deficiency Father     Breast cancer Maternal Grandmother     Lung cancer Maternal Grandfather     Factor V Leiden deficiency Paternal Grandfather     Prostate cancer  Paternal Grandfather     Hyperlipidemia Mother     Hypothyroidism Mother     No Known Problems Sister     No Known Problems Brother     No Known Problems Daughter     No Known Problems Daughter     Hyperlipidemia Maternal Uncle        Social History:   Social History     Socioeconomic History    Marital status:    Tobacco Use    Smoking status: Former     Current packs/day: 0.00     Average packs/day: 0.3 packs/day for 19.9 years (5.0 ttl pk-yrs)     Types: Cigarettes     Start date: 2004     Quit date: 2023     Years since quittin.6    Smokeless tobacco: Former    Tobacco comments:     occasionally    Vaping Use    Vaping status: Never Used   Substance and Sexual Activity    Alcohol use: Yes     Alcohol/week: 10.0 standard drinks of alcohol     Types: 10 Drinks containing 0.5 oz of alcohol per week     Comment: 8-10/week    Drug use: No    Sexual activity: Yes     Partners: Female     Birth control/protection: Vasectomy, Hysterectomy       Immunizations:   Immunization History   Administered Date(s) Administered    COVID-19 (Last 2 Left) 11/15/2020    Fluzone (or Fluarix & Flulaval for VFC) >6mos 2021, 10/31/2022, 10/12/2023    Hepatitis A 2020    Influenza Injectable Mdck Pf Quad 2021, 10/31/2022    Influenza Quad Vaccine (Inpatient) 10/24/2016, 2017    Pneumococcal Conjugate 20-Valent (PCV20) 10/12/2023    Tdap 2020        Medications:     Current Outpatient Medications:     levothyroxine (SYNTHROID, LEVOTHROID) 25 MCG tablet, TAKE 1 TABLET BY MOUTH EVERY MORNING., Disp: 90 tablet, Rfl: 1    Seysara 150 MG tablet, Take 1 tablet by mouth 3 (Three) Times a Week., Disp: , Rfl:     vitamin D (ERGOCALCIFEROL) 1.25 MG (28536 UT) capsule capsule, TAKE 1 CAPSULE BY MOUTH 1 (ONE) TIME PER WEEK., Disp: 4 capsule, Rfl: 3  No current facility-administered medications for this visit.    Allergies:   Allergies   Allergen Reactions    Pollen Extract Other (See Comments)     Sinus  "related       Objective     Vital Signs  /90 (BP Location: Left arm, Patient Position: Sitting, Cuff Size: Adult)   Pulse 78   Temp 98 °F (36.7 °C) (Infrared)   Ht 182.9 cm (72.01\")   Wt 104 kg (229 lb 9.6 oz)   SpO2 97%   BMI 31.13 kg/m²   Estimated body mass index is 31.13 kg/m² as calculated from the following:    Height as of this encounter: 182.9 cm (72.01\").    Weight as of this encounter: 104 kg (229 lb 9.6 oz).           Physical Exam  Vitals and nursing note reviewed.   Constitutional:       General: He is not in acute distress.     Appearance: Normal appearance. He is not ill-appearing.   Cardiovascular:      Rate and Rhythm: Normal rate and regular rhythm.      Pulses: Normal pulses.      Heart sounds: Normal heart sounds. No murmur heard.  Pulmonary:      Effort: Pulmonary effort is normal.      Breath sounds: Normal breath sounds.   Abdominal:      General: Bowel sounds are normal.   Musculoskeletal:      Cervical back: Normal range of motion.      Lumbar back: Spasms present. Positive right straight leg raise test.        Back:    Neurological:      Mental Status: He is alert.            Procedures     Results:  No results found for this or any previous visit (from the past 24 hour(s)).     Assessment and Plan     Assessment/Plan:  Diagnoses and all orders for this visit:    1. Lumbar back pain (Primary)    2. Back pain of lumbar region with sciatica  Overview:  Sniffing and improvement with triamcinolone injection.  He will now go to chiropractic care through Dr. Malhotra    Assessment & Plan:  -Triamcinolone injection in office today.  - He is not interested in physical therapy, he reports he knows what he needs to do to stretch his back out.  - OTC NSAIDs for pain, apply heat, instructed not to lift any weights at this time.  - X-ray ordered  - Instructed to return to clinic in 6 to 8 weeks if pain is not improved.    Orders:  -     XR Spine Lumbar 2 or 3 View; Future  -     " triamcinolone acetonide (KENALOG-40) injection 80 mg        Follow Up  Return in about 13 weeks (around 10/15/2024), or Return to clinic in 6 to 8 weeks if pain does not improve..    ADRIÁN Keller   [facility] [address]

## 2024-07-17 DIAGNOSIS — M54.40 BACK PAIN OF LUMBAR REGION WITH SCIATICA: Primary | ICD-10-CM

## 2024-07-18 ENCOUNTER — TELEPHONE (OUTPATIENT)
Dept: INTERNAL MEDICINE | Facility: CLINIC | Age: 44
End: 2024-07-18
Payer: COMMERCIAL

## 2024-07-18 NOTE — TELEPHONE ENCOUNTER
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  This is an old injury.      Has arthritis as well. I am going to refer to Neurosurgeon to evaluate further.

## 2024-07-29 DIAGNOSIS — M54.40 BACK PAIN OF LUMBAR REGION WITH SCIATICA: Primary | ICD-10-CM

## 2024-08-02 DIAGNOSIS — M54.50 LUMBAR BACK PAIN: Primary | ICD-10-CM

## 2024-09-04 ENCOUNTER — HOSPITAL ENCOUNTER (OUTPATIENT)
Dept: MRI IMAGING | Facility: HOSPITAL | Age: 44
Discharge: HOME | End: 2024-09-04
Admitting: NURSE PRACTITIONER
Payer: COMMERCIAL

## 2024-09-04 DIAGNOSIS — M54.40 BACK PAIN OF LUMBAR REGION WITH SCIATICA: ICD-10-CM

## 2024-09-04 PROCEDURE — 72148 MRI LUMBAR SPINE W/O DYE: CPT

## 2024-09-23 DIAGNOSIS — E55.9 VITAMIN D DEFICIENCY: ICD-10-CM

## 2024-09-23 RX ORDER — ERGOCALCIFEROL 1.25 MG/1
50000 CAPSULE, LIQUID FILLED ORAL WEEKLY
Qty: 4 CAPSULE | Refills: 3 | Status: SHIPPED | OUTPATIENT
Start: 2024-09-23

## 2024-10-16 DIAGNOSIS — E03.9 HYPOTHYROIDISM, UNSPECIFIED TYPE: ICD-10-CM

## 2024-10-16 RX ORDER — LEVOTHYROXINE SODIUM 25 UG/1
25 TABLET ORAL
Qty: 90 TABLET | Refills: 1 | Status: SHIPPED | OUTPATIENT
Start: 2024-10-16

## 2024-10-25 ENCOUNTER — OFFICE VISIT (OUTPATIENT)
Dept: NEUROSURGERY | Facility: CLINIC | Age: 44
End: 2024-10-25
Payer: COMMERCIAL

## 2024-10-25 VITALS
HEART RATE: 78 BPM | BODY MASS INDEX: 30.07 KG/M2 | OXYGEN SATURATION: 97 % | WEIGHT: 222 LBS | SYSTOLIC BLOOD PRESSURE: 128 MMHG | RESPIRATION RATE: 14 BRPM | HEIGHT: 72 IN | DIASTOLIC BLOOD PRESSURE: 82 MMHG | TEMPERATURE: 97.8 F

## 2024-10-25 DIAGNOSIS — M54.16 LUMBAR RADICULOPATHY: Primary | ICD-10-CM

## 2024-10-25 DIAGNOSIS — Z72.0 TOBACCO ABUSE: ICD-10-CM

## 2024-10-25 DIAGNOSIS — M51.362 DEGENERATION OF INTERVERTEBRAL DISC OF LUMBAR REGION WITH DISCOGENIC BACK PAIN AND LOWER EXTREMITY PAIN: ICD-10-CM

## 2024-10-25 PROCEDURE — 99204 OFFICE O/P NEW MOD 45 MIN: CPT | Performed by: PHYSICIAN ASSISTANT

## 2024-10-25 NOTE — PROGRESS NOTES
Patient: Arie Hayden  : 1980  Chart #: [id number]    Date of Service: 10/25/2024    CHIEF COMPLAINT: Back and right leg pain    History of Present Illness Mr. 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  He is new to our clinic.  In the spring 2023 he bent over to  a pillow and felt severe pain in the low back down the right leg to the top of the foot.  His primary care treated him with a cortisone injection and symptoms went away completely.  A similar incident happened this summer and he had recurrent pain down the right leg.  He had a cortisone injection once again and symptoms improved.  He has also been treated with formal physical therapy.  Today he reports no significant back or leg pain.  No sensory alteration.  No focal weakness.  No bowel or bladder difficulties.  He admits to smoking tobacco products daily.      Past Medical History:   Diagnosis Date    Allergic rhinitis     Colon polyp     COVID     Disease of thyroid gland     HYPO    Diverticulitis 01/15/2019    Diverticulosis     Fatigue     Low back pain     Rosacea     Scoliosis     Sleep apnea     DOES NOT USE CPAP         Current Outpatient Medications:     levothyroxine (SYNTHROID, LEVOTHROID) 25 MCG tablet, TAKE 1 TABLET BY MOUTH EVERY MORNING., Disp: 90 tablet, Rfl: 1    Seysara 150 MG tablet, Take 1 tablet by mouth 3 (Three) Times a Week., Disp: , Rfl:     vitamin D (ERGOCALCIFEROL) 1.25 MG (43960 UT) capsule capsule, TAKE 1 CAPSULE BY MOUTH 1 (ONE) TIME PER WEEK., Disp: 4 capsule, Rfl: 3    Past Surgical History:   Procedure Laterality Date    APPENDECTOMY      COLON RESECTION N/A 2019    Procedure: LOWER ANTERIOR RESECTION, APPENDECTOMY, TAKEDOWN OF SPLENIC FLEXURE, EXCISION AND CLOSURE OF COLOCECAL FISTULA, UMBILICAL HERNIA REPAIR, AND PROCTOSCOPY;  Surgeon: Mitchel Beck MD;  [facility];  Service: General    COLONOSCOPY      CT ABSCESS DRAIN CYST ASP      X2     "TONSILLECTOMY  1987    VASECTOMY  2011    VENTRAL/INCISIONAL HERNIA REPAIR N/A 12/4/2023    Procedure: OPEN INCISIONAL HERNIA REPAIR WITH MESH;  Surgeon: Royer Catalan MD;  Location: [facility];  Service: General;  Laterality: N/A;    WISDOM TOOTH EXTRACTION         Social History     Socioeconomic History    Marital status:    Tobacco Use    Smoking status: Every Day     Current packs/day: 0.25     Average packs/day: 0.3 packs/day for 20.7 years (5.2 ttl pk-yrs)     Types: Cigarettes     Start date: 1/8/2004     Last attempt to quit: 11/20/2023    Smokeless tobacco: Former    Tobacco comments:     Light smoker, quit after surgery in 11/23 but started again   Vaping Use    Vaping status: Never Used   Substance and Sexual Activity    Alcohol use: Yes     Alcohol/week: 10.0 standard drinks of alcohol     Types: 10 Drinks containing 0.5 oz of alcohol per week     Comment: 8-10/week    Drug use: No    Sexual activity: Yes     Partners: Female     Birth control/protection: Vasectomy, Hysterectomy         Review of Systems   Musculoskeletal: Negative.        Objective   Vital Signs: Blood pressure 128/82, pulse 78, temperature 97.8 °F (36.6 °C), temperature source Infrared, resp. rate 14, height 182.9 cm (72\"), weight 101 kg (222 lb), SpO2 97%.  Physical Exam  Vitals and nursing note reviewed.   Constitutional:       General: He is not in acute distress.     Appearance: He is well-developed.   HENT:      Head: Normocephalic and atraumatic.   Psychiatric:         Behavior: Behavior normal.         Thought Content: Thought content normal.     Musculoskeletal:     Strength is intact in upper and lower extremities to direct testing.     Station and gait are normal.     Straight leg raising is negative.   Neurologic:     Muscle tone is normal throughout.     Coordination is intact.     Deep tendon reflexes: Diminished in the lower extremities     Sensation is intact to light touch throughout.     Patient is " oriented to person, place, and time.         Independent review of radiographic imaging: MRI of the lumbar spine dated 9/4/2024 9/4/2024 demonstrates a lumbarized segment.  Counting up from that at L5-S1 there is broad-based disc bulge narrowing the right recess.    Assessment & Plan   Diagnosis: Lumbar radiculopathy, improved.     Medical Decision Making: Fortunately, patient is doing better after a cortisone injection. We discussed routine exercises, body mechanics and care of the back. Additionally, I counseled him on the importance of smoking cessation.  If symptoms recur, I'd be happy to see him to reassess.       Diagnoses and all orders for this visit:    1. Lumbar radiculopathy (Primary)    2. Tobacco abuse    3. Degeneration of intervertebral disc of lumbar region with discogenic back pain and lower extremity pain                        Yuly Gomez PA-C  Patient Care Team:  Rosemary Long PA-C as PCP - General (Internal Medicine)  Mitchel Beck MD as Consulting Physician (Colon and Rectal Surgery)  Kendra Arteaga MD as Consulting Physician (Dermatology)  Yuly Gomez PA-C as Physician Assistant (Physician Assistant)

## 2024-11-11 DIAGNOSIS — Z13.220 LIPID SCREENING: ICD-10-CM

## 2024-11-11 DIAGNOSIS — Z12.5 PROSTATE CANCER SCREENING: ICD-10-CM

## 2024-11-11 DIAGNOSIS — E66.3 OVERWEIGHT: ICD-10-CM

## 2024-11-11 DIAGNOSIS — Z13.21 ENCOUNTER FOR VITAMIN DEFICIENCY SCREENING: ICD-10-CM

## 2024-11-11 DIAGNOSIS — E03.9 HYPOTHYROIDISM, UNSPECIFIED TYPE: Primary | ICD-10-CM

## 2024-11-11 DIAGNOSIS — E03.9 HYPOTHYROIDISM (ACQUIRED): ICD-10-CM

## 2024-11-11 DIAGNOSIS — E55.9 VITAMIN D DEFICIENCY: ICD-10-CM

## 2024-11-11 DIAGNOSIS — R73.09 ELEVATED GLUCOSE: ICD-10-CM

## 2024-11-13 ENCOUNTER — LAB (OUTPATIENT)
Dept: LAB | Facility: HOSPITAL | Age: 44
End: 2024-11-13
Payer: COMMERCIAL

## 2024-11-13 DIAGNOSIS — E03.9 HYPOTHYROIDISM (ACQUIRED): ICD-10-CM

## 2024-11-13 DIAGNOSIS — Z13.220 LIPID SCREENING: ICD-10-CM

## 2024-11-13 DIAGNOSIS — E66.3 OVERWEIGHT: ICD-10-CM

## 2024-11-13 DIAGNOSIS — Z11.59 ENCOUNTER FOR HEPATITIS C SCREENING TEST FOR LOW RISK PATIENT: ICD-10-CM

## 2024-11-13 DIAGNOSIS — R73.09 ELEVATED GLUCOSE: ICD-10-CM

## 2024-11-13 DIAGNOSIS — R74.8 ABNORMAL LIVER ENZYMES: ICD-10-CM

## 2024-11-13 DIAGNOSIS — Z12.5 PROSTATE CANCER SCREENING: ICD-10-CM

## 2024-11-13 DIAGNOSIS — Z13.21 ENCOUNTER FOR VITAMIN DEFICIENCY SCREENING: ICD-10-CM

## 2024-11-13 LAB
ALBUMIN SERPL-MCNC: 4.5 G/DL (ref 3.5–5.2)
ALBUMIN/GLOB SERPL: 1.5 G/DL
ALP SERPL-CCNC: 79 U/L (ref 39–117)
ALT SERPL W P-5'-P-CCNC: 104 U/L (ref 1–41)
ANION GAP SERPL CALCULATED.3IONS-SCNC: 17.4 MMOL/L (ref 5–15)
AST SERPL-CCNC: 89 U/L (ref 1–40)
BASOPHILS # BLD AUTO: 0.08 10*3/MM3 (ref 0–0.2)
BASOPHILS NFR BLD AUTO: 0.9 % (ref 0–1.5)
BILIRUB SERPL-MCNC: 1 MG/DL (ref 0–1.2)
BUN SERPL-MCNC: 9 MG/DL (ref 6–20)
BUN/CREAT SERPL: 10.1 (ref 7–25)
CALCIUM SPEC-SCNC: 9.5 MG/DL (ref 8.6–10.5)
CHLORIDE SERPL-SCNC: 98 MMOL/L (ref 98–107)
CHOLEST SERPL-MCNC: 240 MG/DL (ref 0–200)
CO2 SERPL-SCNC: 23.6 MMOL/L (ref 22–29)
CREAT SERPL-MCNC: 0.89 MG/DL (ref 0.76–1.27)
DEPRECATED RDW RBC AUTO: 42.6 FL (ref 37–54)
EGFRCR SERPLBLD CKD-EPI 2021: 107.7 ML/MIN/1.73
EOSINOPHIL # BLD AUTO: 0.06 10*3/MM3 (ref 0–0.4)
EOSINOPHIL NFR BLD AUTO: 0.7 % (ref 0.3–6.2)
ERYTHROCYTE [DISTWIDTH] IN BLOOD BY AUTOMATED COUNT: 12.3 % (ref 12.3–15.4)
GLOBULIN UR ELPH-MCNC: 3.1 GM/DL
GLUCOSE SERPL-MCNC: 70 MG/DL (ref 65–99)
HCT VFR BLD AUTO: 46.4 % (ref 37.5–51)
HDLC SERPL-MCNC: 98 MG/DL (ref 40–60)
HGB BLD-MCNC: 15.9 G/DL (ref 13–17.7)
IMM GRANULOCYTES # BLD AUTO: 0.02 10*3/MM3 (ref 0–0.05)
IMM GRANULOCYTES NFR BLD AUTO: 0.2 % (ref 0–0.5)
LDLC SERPL CALC-MCNC: 127 MG/DL (ref 0–100)
LDLC/HDLC SERPL: 1.27 {RATIO}
LYMPHOCYTES # BLD AUTO: 1.85 10*3/MM3 (ref 0.7–3.1)
LYMPHOCYTES NFR BLD AUTO: 20 % (ref 19.6–45.3)
MCH RBC QN AUTO: 32.6 PG (ref 26.6–33)
MCHC RBC AUTO-ENTMCNC: 34.3 G/DL (ref 31.5–35.7)
MCV RBC AUTO: 95.3 FL (ref 79–97)
MONOCYTES # BLD AUTO: 0.79 10*3/MM3 (ref 0.1–0.9)
MONOCYTES NFR BLD AUTO: 8.6 % (ref 5–12)
NEUTROPHILS NFR BLD AUTO: 6.43 10*3/MM3 (ref 1.7–7)
NEUTROPHILS NFR BLD AUTO: 69.6 % (ref 42.7–76)
NRBC BLD AUTO-RTO: 0 /100 WBC (ref 0–0.2)
PLATELET # BLD AUTO: 245 10*3/MM3 (ref 140–450)
PMV BLD AUTO: 10.6 FL (ref 6–12)
POTASSIUM SERPL-SCNC: 4.2 MMOL/L (ref 3.5–5.2)
PROT SERPL-MCNC: 7.6 G/DL (ref 6–8.5)
PSA SERPL-MCNC: 0.66 NG/ML (ref 0–4)
RBC # BLD AUTO: 4.87 10*6/MM3 (ref 4.14–5.8)
SODIUM SERPL-SCNC: 139 MMOL/L (ref 136–145)
T3FREE SERPL-MCNC: 3.35 PG/ML (ref 2–4.4)
T4 FREE SERPL-MCNC: 1.46 NG/DL (ref 0.92–1.68)
TRIGL SERPL-MCNC: 87 MG/DL (ref 0–150)
TSH SERPL DL<=0.05 MIU/L-ACNC: 2.6 UIU/ML (ref 0.27–4.2)
VLDLC SERPL-MCNC: 15 MG/DL (ref 5–40)
WBC NRBC COR # BLD AUTO: 9.23 10*3/MM3 (ref 3.4–10.8)

## 2024-11-13 PROCEDURE — 84481 FREE ASSAY (FT-3): CPT

## 2024-11-13 PROCEDURE — 86803 HEPATITIS C AB TEST: CPT

## 2024-11-13 PROCEDURE — 36415 COLL VENOUS BLD VENIPUNCTURE: CPT

## 2024-11-13 PROCEDURE — 82306 VITAMIN D 25 HYDROXY: CPT

## 2024-11-13 PROCEDURE — 82607 VITAMIN B-12: CPT

## 2024-11-13 PROCEDURE — 84439 ASSAY OF FREE THYROXINE: CPT

## 2024-11-13 PROCEDURE — 82043 UR ALBUMIN QUANTITATIVE: CPT

## 2024-11-13 PROCEDURE — 83036 HEMOGLOBIN GLYCOSYLATED A1C: CPT

## 2024-11-13 PROCEDURE — 80050 GENERAL HEALTH PANEL: CPT

## 2024-11-13 PROCEDURE — 80061 LIPID PANEL: CPT

## 2024-11-13 PROCEDURE — 82977 ASSAY OF GGT: CPT

## 2024-11-13 PROCEDURE — G0103 PSA SCREENING: HCPCS

## 2024-11-14 LAB
25(OH)D3 SERPL-MCNC: 48.4 NG/ML (ref 30–100)
ALBUMIN UR-MCNC: 5.6 MG/DL
HBA1C MFR BLD: 5.2 % (ref 4.8–5.6)
VIT B12 BLD-MCNC: 764 PG/ML (ref 211–946)

## 2024-11-15 ENCOUNTER — OFFICE VISIT (OUTPATIENT)
Dept: INTERNAL MEDICINE | Facility: CLINIC | Age: 44
End: 2024-11-15
Payer: COMMERCIAL

## 2024-11-15 VITALS
WEIGHT: 224.2 LBS | HEART RATE: 84 BPM | TEMPERATURE: 98.2 F | DIASTOLIC BLOOD PRESSURE: 88 MMHG | SYSTOLIC BLOOD PRESSURE: 126 MMHG | HEIGHT: 72 IN | BODY MASS INDEX: 30.37 KG/M2 | OXYGEN SATURATION: 96 %

## 2024-11-15 DIAGNOSIS — M54.40 BACK PAIN OF LUMBAR REGION WITH SCIATICA: ICD-10-CM

## 2024-11-15 DIAGNOSIS — Z11.59 ENCOUNTER FOR HEPATITIS C SCREENING TEST FOR LOW RISK PATIENT: ICD-10-CM

## 2024-11-15 DIAGNOSIS — L71.9 ROSACEA: Chronic | ICD-10-CM

## 2024-11-15 DIAGNOSIS — E55.9 VITAMIN D DEFICIENCY: ICD-10-CM

## 2024-11-15 DIAGNOSIS — E03.9 HYPOTHYROIDISM, UNSPECIFIED TYPE: ICD-10-CM

## 2024-11-15 DIAGNOSIS — Z78.9 ALCOHOL USE: Chronic | ICD-10-CM

## 2024-11-15 DIAGNOSIS — R74.8 ABNORMAL LIVER ENZYMES: ICD-10-CM

## 2024-11-15 DIAGNOSIS — E03.9 HYPOTHYROIDISM (ACQUIRED): ICD-10-CM

## 2024-11-15 DIAGNOSIS — Z00.00 ROUTINE MEDICAL EXAM: Primary | ICD-10-CM

## 2024-11-15 LAB
GGT SERPL-CCNC: 72 U/L (ref 8–61)
HCV AB SER QL: NORMAL

## 2024-11-15 RX ORDER — ERGOCALCIFEROL 1.25 MG/1
50000 CAPSULE, LIQUID FILLED ORAL WEEKLY
Qty: 4 CAPSULE | Refills: 2 | Status: SHIPPED | OUTPATIENT
Start: 2024-11-15

## 2024-11-15 RX ORDER — LEVOTHYROXINE SODIUM 25 UG/1
25 TABLET ORAL
Qty: 90 TABLET | Refills: 1 | Status: SHIPPED | OUTPATIENT
Start: 2024-11-15

## 2024-11-15 NOTE — PATIENT INSTRUCTIONS
"BMI for Adults  Body mass index (BMI) is a number found using a person's weight and height. BMI can help tell how much of a person's weight is made up of fat. BMI does not measure body fat directly. It is used instead of tests that directly measure body fat, which can be difficult and expensive.  What are BMI measurements used for?  BMI is useful to:  Find out if your weight puts you at higher risk for medical problems.  Help recommend changes, such as in diet and exercise. This can help you reach a healthy weight. BMI screening can be done again to see if these changes are working.  How is BMI calculated?  Your height and weight are measured. The BMI is found from those numbers. This can be done with [place] or metric measurements. Note that charts and online BMI calculators are available to help you find your BMI quickly and easily without doing these calculations.  To calculate your BMI in [place] measurements:  Measure your weight in pounds (lb).  Multiply the number of pounds by 703.  So, for an adult who weighs 150 lb, multiply that number by 703: 150 x 703, which equals 105,450.  Measure your height in inches. Then multiply that number by itself to get a measurement called \"inches squared.\"  So, for an adult who is 70 inches tall, the \"inches squared\" measurement is 70 inches x 70 inches, which equals 4,900 inches squared.  Divide the total from step 2 (number of lb x 703) by the total from step 3 (inches squared): 105,450 ÷ 4,900 = 21.5. This is your BMI.  To calculate your BMI in metric measurements:    Measure your weight in kilograms (kg).  For this example, the weight is 70 kg.  Measure your height in meters (m). Then multiply that number by itself to get a measurement called \"meters squared.\"  So, for an adult who is 1.75 m tall, the \"meters squared\" measurement is 1.75 m x 1.75 m, which equals 3.1 meters squared.  Divide the number of kilograms (your weight) by the meters squared number. In this example: 70 " ÷ 3.1 = 22.6. This is your BMI.  What do the results mean?  BMI charts are used to see if you are underweight, normal weight, overweight, or obese. The following guidelines will be used:  Underweight: BMI less than 18.5.  Normal weight: BMI between 18.5 and 24.9.  Overweight: BMI between 25 and 29.9.  Obese: BMI of 30 or above.  BMI is a tool and cannot diagnose a condition. Talk with your health care provider about what your BMI means for you. Keep these notes in mind:  Weight includes fat and muscle. Someone with a muscular build, such as an athlete, may have a BMI that is higher than 24.9. In cases like these, BMI is not a correct measure of body fat.  If you have a BMI of 25 or higher, your provider may need to do more testing to find out if excess body fat is the cause.  BMI is measured the same way for males and females. Females usually have more body fat than males of the same height and weight.  Where to find more information  For more information about BMI, including tools to quickly find your BMI, go to:  Centers for Disease Control and Prevention: cdc.gov  American Heart Association: heart.org  National Heart, Lung, and Blood [place]: nhlbi.nih.gov  This information is not intended to replace advice given to you by your health care provider. Make sure you discuss any questions you have with your health care provider.  Document Revised: 09/07/2023 Document Reviewed: 08/31/2023  [employer] Patient Education © 2024 [employer]  Advance Directive    Advance directives are legal documents that allow you to make decisions about your health care and medical treatment in case you become unable to communicate for yourself. Advance directives let your wishes be known to family, friends, and health care providers.  Discussing and writing advance directives should happen over time rather than all at once. Advance directives can be changed and updated at any time. There are different types of advance directives,  such as:  Medical power of .  Living will.  Do not resuscitate (DNR) order or do not attempt resuscitation (DNAR) order.  Health care proxy and medical power of   A health care proxy is also called a health care agent. This person is appointed to make medical decisions for you when you are unable to make decisions for yourself. Generally, people ask a trusted friend or family member to act as their proxy and represent their preferences. Make sure you have an agreement with your trusted person to act as your proxy. A proxy may have to make a medical decision on your behalf if your wishes are not known.  A medical power of , also called a durable power of  for health care, is a legal document that names your health care proxy. Depending on the laws in your state, the document may need to be:  Signed.  Notarized.  Dated.  Copied.  Witnessed.  Incorporated into your medical record.  You may also want to appoint a trusted person to manage your money in the event you are unable to do so. This is called a durable power of  for finances. It is a separate legal document from the durable power of  for health care. You may choose your health care proxy or someone different to act as your agent in money matters.  If you do not appoint a proxy, or there is a concern that the proxy is not acting in your best interest, a court may appoint a guardian to act on your behalf.  Living will  A living will is a set of instructions that state your wishes about medical care when you cannot express them yourself. Health care providers should keep a copy of your living will in your medical record. You may want to give a copy to family members or friends. To alert caregivers in case of an emergency, you can place a card in your wallet to let them know that you have a living will and where they can find it. A living will is used if you become:  Terminally ill.  Disabled.  Unable to communicate or  make decisions.  The following decisions should be included in your living will:  To use or not to use life support equipment, such as dialysis machines and breathing machines (ventilators).  Whether you want a DNR or DNAR order. This tells health care providers not to use cardiopulmonary resuscitation (CPR) if breathing or heartbeat stops.  To use or not to use tube feeding.  To be given or not to be given food and fluids.  Whether you want comfort (palliative) care when the goal becomes comfort rather than a cure.  Whether you want to donate your organs and tissues.  A living will does not give instructions for distributing your money and property if you should pass away.  DNR or DNAR  A DNR or DNAR order is a request not to have CPR in the event that your heart stops beating or you stop breathing. If a DNR or DNAR order has not been made and shared, a health care provider will try to help any patient whose heart has stopped or who has stopped breathing. If you plan to have surgery, talk with your health care provider about how your DNR or DNAR order will be followed if problems occur.  What if I do not have an advance directive?  Some states assign family decision makers to act on your behalf if you do not have an advance directive. Each state has its own laws about advance directives. You may want to check with your health care provider, , or state representative about the laws in your state.  Summary  Advance directives are legal documents that allow you to make decisions about your health care and medical treatment in case you become unable to communicate for yourself.  The process of discussing and writing advance directives should happen over time. You can change and update advance directives at any time.  Advance directives may include a medical power of , a living will, and a DNR or DNAR order.  This information is not intended to replace advice given to you by your health care provider.  "Make sure you discuss any questions you have with your health care provider.  Document Revised: 09/21/2021 Document Reviewed: 09/21/2021  [employer] Patient Education © 2024 [employer]  Smoking Tobacco Information, Adult  Smoking tobacco can be harmful to your health. Tobacco contains a toxic colorless chemical called nicotine. Nicotine causes changes in your brain that make you want more and more. This is called addiction. This can make it hard to stop smoking once you start. Tobacco also has other toxic chemicals that can hurt your body and raise your risk of many cancers.  Menthol or \"lite\" tobacco or cigarette brands are not safer than regular brands.  How can smoking tobacco affect me?  Smoking tobacco puts you at risk for:  Cancer. Smoking is most commonly associated with lung cancer, but can also lead to cancer in other parts of the body.  Chronic obstructive pulmonary disease (COPD). This is a long-term lung condition that makes it hard to breathe. It also gets worse over time.  High blood pressure (hypertension), heart disease, stroke, heart attack, and lung infections, such as pneumonia.  Cataracts. This is when the lenses in the eyes become clouded.  Digestive problems. This may include peptic ulcers, heartburn, and gastroesophageal reflux disease (GERD).  Oral health problems, such as gum disease, mouth sores, and tooth loss.  Loss of taste and smell.  Smoking also affects how you look and smell. Smoking may cause:  Wrinkles.  Yellow or stained teeth, fingers, and fingernails.  Bad breath.  Bad-smelling clothes and hair.  Smoking tobacco can also affect your social life, because:  It may be challenging to find places to smoke when away from home. Many workplaces, restaurants, hotels, and public places are tobacco-free.  Smoking is expensive. This is due to the cost of tobacco and the long-term costs of treating health problems from smoking.  Secondhand smoke may affect those around you. Secondhand smoke " can cause lung cancer, breathing problems, and heart disease. Children of smokers have a higher risk for:  Sudden infant death syndrome (SIDS).  Ear infections.  Lung infections.  What actions can I take to prevent health problems?  Quit smoking    Do not start smoking. Quit if you already smoke.  Do not replace cigarette smoking with vaping devices, such as e-cigarettes.  Make a plan to quit smoking and commit to it. Look for programs to help you, and ask your health care provider for recommendations and ideas. Set a date and write down all the reasons you want to quit.  Let your friends and family know you are quitting so they can help and support you. Consider finding friends who also want to quit. It can be easier to quit with someone else, so that you can support each other.  Talk with your health care provider about using nicotine replacement medicines to help you quit. These include gum, lozenges, patches, sprays, or pills.  If you try to quit but return to smoking, stay positive. It is common to slip up when you first quit, so take it one day at a time.  Be prepared for cravings. When you feel the urge to smoke, chew gum or suck on hard candy.  Lifestyle  Stay busy.  Take care of your body. Get plenty of exercise, eat a healthy diet, and drink plenty of water.  Find ways to manage your stress, such as meditation, yoga, exercise, or time spent with friends and family.  Ask your health care provider about having regular tests (screenings) to check for cancer. This may include blood tests, imaging tests, and other tests.  Where to find support  To get support to quit smoking, consider:  Asking your health care provider for more information and resources.  Joining a support group for people who want to quit smoking in your local community. There are many effective programs that may help you to quit.  Calling the smokefree.gov counselor helpline at 0-800-QUIT-NOW (1-221.404.2711).  Where to find more  information  You may find more information about quitting smoking from:  Centers for Disease Control and Prevention: cdc.gov/tobacco  Smokefree.gov: smokefree.gov  American Lung Association: freedomfromsmoking.org  Contact a health care provider if:  You have problems breathing.  Your lips, nose, or fingers turn blue.  You have chest pain.  You are coughing up blood.  You feel like you will faint.  You have other health changes that cause you to worry.  Summary  Smoking tobacco can negatively affect your health, the health of those around you, your finances, and your social life.  Do not start smoking. Quit if you already smoke. If you need help quitting, ask your health care provider.  Consider joining a support group for people in your local community who want to quit smoking. There are many effective programs that may help you to quit.  This information is not intended to replace advice given to you by your health care provider. Make sure you discuss any questions you have with your health care provider.  Document Revised: 12/13/2022 Document Reviewed: 12/13/2022  [employer] Patient Education © 2024 [employer]

## 2024-11-15 NOTE — PROGRESS NOTES
[facility] Internal Medicine    Arie Hayden  1980   [id number]      Patient Care Team:  Rosemary Long PA-C as PCP - General (Internal Medicine)  Mitchel Beck MD as Consulting Physician (Colon and Rectal Surgery)  Kendra Arteaga MD as Consulting Physician (Dermatology)  Yuly Gomez PA-C as Physician Assistant (Physician Assistant)    Chief Complaint::   Chief Complaint   Patient presents with    Annual Exam    Hypothyroidism          HPI  History of Present Illness  Arie Hayden date of birth 1980 is a 44-year-old male who presents today for a routine physical.  Past medical history significant for tobacco abuse, diverticulitis, sleep apnea, rosacea, alcohol use, hypothyroidism, vitamin D deficiency.    He received his influenza vaccine today. He has not received the COVID-19 vaccine. His last colonoscopy was performed on 02/08/2022 by Dr. Orantes, with a follow-up scheduled for 02/2025.    He exercises twice a week at the [employer].  His past medical history is significant for hypothyroidism, vitamin D deficiency, tobacco abuse, and back pain. He is currently under the care of a dermatologist for rosacea.    He recently sprained his right ankle while walking on cobblestones but reports no pain when walking. He has been applying ice to the area for the past 2.5 weeks.  He reports no chest pain, shortness of breath, or abdominal pain.  SOCIAL HISTORY  He drinks alcohol. He smokes half a pack a day.         Patient Active Problem List   Diagnosis    Left lower quadrant pain    Sepsis     Tobacco abuse    Abscess of sigmoid colon due to diverticulitis    Allergic rhinitis    Fatigue    Snores    Diverticulitis    Sleep apnea    Rosacea    Alcohol use    Encounter for preventive health examination    Lipid screening    Screening PSA (prostate specific antigen)    Elevated blood pressure reading    Back pain of lumbar region with sciatica    Hypothyroidism (acquired)     Vitamin D deficiency    Encounter for vitamin deficiency screening    Overweight    Routine medical exam        Past Medical History:   Diagnosis Date    Allergic rhinitis     Colon polyp     COVID     Disease of thyroid gland     HYPO    Diverticulitis 01/15/2019    Diverticulosis     Fatigue     Low back pain     Rosacea     Scoliosis     Sleep apnea     DOES NOT USE CPAP       Past Surgical History:   Procedure Laterality Date    APPENDECTOMY      COLON RESECTION N/A 05/06/2019    Procedure: LOWER ANTERIOR RESECTION, APPENDECTOMY, TAKEDOWN OF SPLENIC FLEXURE, EXCISION AND CLOSURE OF COLOCECAL FISTULA, UMBILICAL HERNIA REPAIR, AND PROCTOSCOPY;  Surgeon: Mitchel Beck MD;  Location:  [facility];  Service: General    COLONOSCOPY      CT ABSCESS DRAIN CYST ASP      X2    TONSILLECTOMY  1987    VASECTOMY  2011    VENTRAL/INCISIONAL HERNIA REPAIR N/A 12/4/2023    Procedure: OPEN INCISIONAL HERNIA REPAIR WITH MESH;  Surgeon: Royer Catalan MD;  Location:  [facility];  Service: General;  Laterality: N/A;    WISDOM TOOTH EXTRACTION         Family History   Problem Relation Age of Onset    Factor V Leiden deficiency Father     Suicidality Father     Factor IX deficiency Father     Depression Father         Suicide    Breast cancer Maternal Grandmother     Cancer Maternal Grandmother         Breast    Lung cancer Maternal Grandfather     Cancer Maternal Grandfather         Lung    Factor V Leiden deficiency Paternal Grandfather     Prostate cancer Paternal Grandfather     Cancer Paternal Grandfather         Prostate    Hyperlipidemia Mother     Hypothyroidism Mother     No Known Problems Sister     No Known Problems Brother     No Known Problems Daughter     No Known Problems Daughter     Hyperlipidemia Maternal Uncle        Social History     Socioeconomic History    Marital status:    Tobacco Use    Smoking status: Every Day     Current packs/day: 0.25     Average packs/day: 0.3 packs/day for 20.7 years  "(5.2 ttl pk-yrs)     Types: Cigarettes     Start date: 1/8/2004     Last attempt to quit: 11/20/2023    Smokeless tobacco: Former    Tobacco comments:     Light smoker, quit after surgery in 11/23 but started again   Vaping Use    Vaping status: Never Used   Substance and Sexual Activity    Alcohol use: Yes     Alcohol/week: 10.0 standard drinks of alcohol     Types: 10 Drinks containing 0.5 oz of alcohol per week     Comment: 8-10/week    Drug use: No    Sexual activity: Yes     Partners: Female     Birth control/protection: Vasectomy, Hysterectomy       Allergies   Allergen Reactions    Pollen Extract Other (See Comments)     Sinus related       Review of Systems   Constitutional:  Negative for activity change, appetite change, diaphoresis, fatigue, unexpected weight gain and unexpected weight loss.   HENT:  Negative for hearing loss.    Eyes:  Negative for visual disturbance.   Respiratory:  Negative for chest tightness and shortness of breath.    Cardiovascular:  Negative for chest pain, palpitations and leg swelling.   Gastrointestinal:  Negative for abdominal pain, blood in stool, GERD and indigestion.   Endocrine: Negative for cold intolerance and heat intolerance.   Genitourinary:  Negative for dysuria and hematuria.   Musculoskeletal:  Negative for arthralgias and myalgias.   Skin:  Negative for skin lesions.   Neurological:  Negative for tremors, seizures, syncope, speech difficulty, weakness, headache, memory problem and confusion.   Hematological:  Does not bruise/bleed easily.   Psychiatric/Behavioral:  Negative for sleep disturbance and depressed mood. The patient is not nervous/anxious.           Vital Signs  Vitals:    11/15/24 1106   BP: 126/88   BP Location: Right arm   Patient Position: Sitting   Cuff Size: Adult   Pulse: 84   Temp: 98.2 °F (36.8 °C)   TempSrc: Temporal   SpO2: 96%   Weight: 102 kg (224 lb 3.2 oz)   Height: 182.9 cm (72.01\")   PainSc: 0-No pain     Body mass index is 30.4 " kg/m².  BMI is >= 30 and <35. (Class 1 Obesity). The following options were offered after discussion;: weight loss educational material (shared in after visit summary), exercise counseling/recommendations, and nutrition counseling/recommendations     Advance Care Planning   ACP discussion was held with the patient during this visit. Patient does not have an advance directive, information provided.       Current Outpatient Medications:     levothyroxine (SYNTHROID, LEVOTHROID) 25 MCG tablet, Take 1 tablet by mouth Every Morning., Disp: 90 tablet, Rfl: 1    Seysara 150 MG tablet, Take 1 tablet by mouth 3 (Three) Times a Week., Disp: , Rfl:     vitamin D (ERGOCALCIFEROL) 1.25 MG (15188 UT) capsule capsule, Take 1 capsule by mouth 1 (One) Time Per Week., Disp: 4 capsule, Rfl: 2    Physical Exam  Vitals reviewed.   Constitutional:       Appearance: Normal appearance. He is well-developed.   HENT:      Head: Normocephalic and atraumatic.      Right Ear: Hearing, tympanic membrane, ear canal and external ear normal.      Left Ear: Hearing, tympanic membrane, ear canal and external ear normal.      Nose: Nose normal.      Mouth/Throat:      Pharynx: Uvula midline.   Eyes:      General: Lids are normal.      Conjunctiva/sclera: Conjunctivae normal.      Pupils: Pupils are equal, round, and reactive to light.   Cardiovascular:      Rate and Rhythm: Normal rate and regular rhythm.      Heart sounds: Normal heart sounds.   Pulmonary:      Effort: Pulmonary effort is normal.      Breath sounds: Normal breath sounds.   Abdominal:      General: Bowel sounds are normal.      Palpations: Abdomen is soft.   Musculoskeletal:         General: Normal range of motion.      Cervical back: Full passive range of motion without pain, normal range of motion and neck supple.   Skin:     General: Skin is warm and dry.      Findings: Erythema present.      Comments: Erythema nose   Neurological:      Mental Status: He is alert and oriented to  person, place, and time.      Deep Tendon Reflexes: Reflexes are normal and symmetric.   Psychiatric:         Speech: Speech normal.         Behavior: Behavior normal.         Thought Content: Thought content normal.         Judgment: Judgment normal.          ACE III MINI            Results Review:    Recent Results (from the past 4 weeks)   Comprehensive Metabolic Panel    Collection Time: 11/13/24  4:20 PM    Specimen: Blood   Result Value Ref Range    Glucose 70 65 - 99 mg/dL    BUN 9 6 - 20 mg/dL    Creatinine 0.89 0.76 - 1.27 mg/dL    Sodium 139 136 - 145 mmol/L    Potassium 4.2 3.5 - 5.2 mmol/L    Chloride 98 98 - 107 mmol/L    CO2 23.6 22.0 - 29.0 mmol/L    Calcium 9.5 8.6 - 10.5 mg/dL    Total Protein 7.6 6.0 - 8.5 g/dL    Albumin 4.5 3.5 - 5.2 g/dL    ALT (SGPT) 104 (H) 1 - 41 U/L    AST (SGOT) 89 (H) 1 - 40 U/L    Alkaline Phosphatase 79 39 - 117 U/L    Total Bilirubin 1.0 0.0 - 1.2 mg/dL    Globulin 3.1 gm/dL    A/G Ratio 1.5 g/dL    BUN/Creatinine Ratio 10.1 7.0 - 25.0    Anion Gap 17.4 (H) 5.0 - 15.0 mmol/L    eGFR 107.7 >60.0 mL/min/1.73   Hemoglobin A1c    Collection Time: 11/13/24  4:20 PM    Specimen: Blood   Result Value Ref Range    Hemoglobin A1C 5.20 4.80 - 5.60 %   Lipid Panel    Collection Time: 11/13/24  4:20 PM    Specimen: Blood   Result Value Ref Range    Total Cholesterol 240 (H) 0 - 200 mg/dL    Triglycerides 87 0 - 150 mg/dL    HDL Cholesterol 98 (H) 40 - 60 mg/dL    LDL Cholesterol  127 (H) 0 - 100 mg/dL    VLDL Cholesterol 15 5 - 40 mg/dL    LDL/HDL Ratio 1.27    MicroAlbumin, Urine, Random - Urine, Clean Catch    Collection Time: 11/13/24  4:20 PM    Specimen: Urine, Clean Catch   Result Value Ref Range    Microalbumin, Urine 5.6 mg/dL   PSA Screen    Collection Time: 11/13/24  4:20 PM    Specimen: Blood   Result Value Ref Range    PSA 0.656 0.000 - 4.000 ng/mL   T3, Free    Collection Time: 11/13/24  4:20 PM    Specimen: Blood   Result Value Ref Range    T3, Free 3.35 2.00 - 4.40  pg/mL   T4, Free    Collection Time: 11/13/24  4:20 PM    Specimen: Blood   Result Value Ref Range    Free T4 1.46 0.92 - 1.68 ng/dL   TSH    Collection Time: 11/13/24  4:20 PM    Specimen: Blood   Result Value Ref Range    TSH 2.600 0.270 - 4.200 uIU/mL   Vitamin D,25-Hydroxy    Collection Time: 11/13/24  4:20 PM    Specimen: Blood   Result Value Ref Range    25 Hydroxy, Vitamin D 48.4 30.0 - 100.0 ng/ml   Vitamin B12    Collection Time: 11/13/24  4:20 PM    Specimen: Blood   Result Value Ref Range    Vitamin B-12 764 211 - 946 pg/mL   CBC Auto Differential    Collection Time: 11/13/24  4:20 PM    Specimen: Blood   Result Value Ref Range    WBC 9.23 3.40 - 10.80 10*3/mm3    RBC 4.87 4.14 - 5.80 10*6/mm3    Hemoglobin 15.9 13.0 - 17.7 g/dL    Hematocrit 46.4 37.5 - 51.0 %    MCV 95.3 79.0 - 97.0 fL    MCH 32.6 26.6 - 33.0 pg    MCHC 34.3 31.5 - 35.7 g/dL    RDW 12.3 12.3 - 15.4 %    RDW-SD 42.6 37.0 - 54.0 fl    MPV 10.6 6.0 - 12.0 fL    Platelets 245 140 - 450 10*3/mm3    Neutrophil % 69.6 42.7 - 76.0 %    Lymphocyte % 20.0 19.6 - 45.3 %    Monocyte % 8.6 5.0 - 12.0 %    Eosinophil % 0.7 0.3 - 6.2 %    Basophil % 0.9 0.0 - 1.5 %    Immature Grans % 0.2 0.0 - 0.5 %    Neutrophils, Absolute 6.43 1.70 - 7.00 10*3/mm3    Lymphocytes, Absolute 1.85 0.70 - 3.10 10*3/mm3    Monocytes, Absolute 0.79 0.10 - 0.90 10*3/mm3    Eosinophils, Absolute 0.06 0.00 - 0.40 10*3/mm3    Basophils, Absolute 0.08 0.00 - 0.20 10*3/mm3    Immature Grans, Absolute 0.02 0.00 - 0.05 10*3/mm3    nRBC 0.0 0.0 - 0.2 /100 WBC   Hepatitis C antibody    Collection Time: 11/13/24  4:20 PM    Specimen: Blood   Result Value Ref Range    Hepatitis C Ab Non-Reactive Non-Reactive   Gamma GT    Collection Time: 11/13/24  4:20 PM    Specimen: Blood   Result Value Ref Range    GGT 72 (H) 8 - 61 U/L     Procedures        Medication Review: Medications reviewed and noted    Social History     Socioeconomic History    Marital status:    Tobacco Use     Smoking status: Every Day     Current packs/day: 0.25     Average packs/day: 0.3 packs/day for 20.7 years (5.2 ttl pk-yrs)     Types: Cigarettes     Start date: 1/8/2004     Last attempt to quit: 11/20/2023    Smokeless tobacco: Former    Tobacco comments:     Light smoker, quit after surgery in 11/23 but started again   Vaping Use    Vaping status: Never Used   Substance and Sexual Activity    Alcohol use: Yes     Alcohol/week: 10.0 standard drinks of alcohol     Types: 10 Drinks containing 0.5 oz of alcohol per week     Comment: 8-10/week    Drug use: No    Sexual activity: Yes     Partners: Female     Birth control/protection: Vasectomy, Hysterectomy        Assessment/Plan:    Diagnoses and all orders for this visit:    1. Routine medical exam (Primary)  Overview:  Reviewed fasting labs today.  He is encouraged to continue vitamin D supplementation.  No change in thyroid medication. Try and cut back on alcohol use and smoking.  Try to increase exercise, particularly walking and elliptical up to 4 to 5 days a week.        2. Encounter for hepatitis C screening test for low risk patient  -     Hepatitis C antibody; Future    3. Abnormal liver enzymes  -     Comprehensive Metabolic Panel; Future  -     Gamma GT; Future    4. Vitamin D deficiency  Overview:  Continue vitamin D 50,000 units weekly.    Orders:  -     vitamin D (ERGOCALCIFEROL) 1.25 MG (24566 UT) capsule capsule; Take 1 capsule by mouth 1 (One) Time Per Week.  Dispense: 4 capsule; Refill: 2    5. Hypothyroidism, unspecified type  -     levothyroxine (SYNTHROID, LEVOTHROID) 25 MCG tablet; Take 1 tablet by mouth Every Morning.  Dispense: 90 tablet; Refill: 1    6. Hypothyroidism (acquired)  Overview:  Continue levothyroxine 25 mcg daily.      7. Alcohol use  Overview:  Reports drinking 3-4drinks 5 nights a week.  No hx of w/d or seizures.  He is trying to cut back.      8. Back pain of lumbar region with sciatica    9. Rosacea  Overview:  Petra  Arteaga-Seysara 150mg daily.      Other orders  -     Fluzone >6mos         Assessment & Plan  1. Routine physical.  His B12 and blood sugar levels are within normal range. However, there is a significant increase in his liver enzymes compared to the previous test. His A1c is at 5.2 percent, indicating good glycemic control. His HDL cholesterol has been consistently high, which is beneficial. However, his LDL cholesterol has increased over the past 6 months, which is concerning. His PSA and thyroid levels are within normal range. His vitamin D level is also satisfactory. A repeat liver function test will be conducted in a week. He is advised to reduce his intake of processed foods and fats. He should continue his current regimen of levothyroxine and vitamin D.    2. Hypothyroidism.  He should continue his current dose of levothyroxine. A new prescription for levothyroxine has been sent to his pharmacy.    3. Vitamin D deficiency.  His vitamin D level is satisfactory. He should continue taking vitamin D once a week for two more months. A prescription has been sent to his pharmacy.    4. Elevated liver enzymes.  A repeat liver function test will be conducted in a week. He is advised to cut back on his alcohol consumption.    5. Tobacco abuse.  He is advised to quit smoking and consider replacing the habit with a healthier activity.    6. Back pain.  He reports that cortisone shots have been effective in managing his back pain. He is advised to continue exercising and losing weight to help with his back pain.    7. Health Maintenance.  He received a flu shot today. He is due for a repeat colonoscopy in February 2025. A hepatitis C screening will be added to his blood test.    Follow-up  Return in 8 weeks for follow up.       Patient Instructions   BMI for Adults  Body mass index (BMI) is a number found using a person's weight and height. BMI can help tell how much of a person's weight is made up of fat. BMI does not  "measure body fat directly. It is used instead of tests that directly measure body fat, which can be difficult and expensive.  What are BMI measurements used for?  BMI is useful to:  Find out if your weight puts you at higher risk for medical problems.  Help recommend changes, such as in diet and exercise. This can help you reach a healthy weight. BMI screening can be done again to see if these changes are working.  How is BMI calculated?  Your height and weight are measured. The BMI is found from those numbers. This can be done with U[place] or metric measurements. Note that charts and online BMI calculators are available to help you find your BMI quickly and easily without doing these calculations.  To calculate your BMI in U[place] measurements:  Measure your weight in pounds (lb).  Multiply the number of pounds by 703.  So, for an adult who weighs 150 lb, multiply that number by 703: 150 x 703, which equals 105,450.  Measure your height in inches. Then multiply that number by itself to get a measurement called \"inches squared.\"  So, for an adult who is 70 inches tall, the \"inches squared\" measurement is 70 inches x 70 inches, which equals 4,900 inches squared.  Divide the total from step 2 (number of lb x 703) by the total from step 3 (inches squared): 105,450 ÷ 4,900 = 21.5. This is your BMI.  To calculate your BMI in metric measurements:    Measure your weight in kilograms (kg).  For this example, the weight is 70 kg.  Measure your height in meters (m). Then multiply that number by itself to get a measurement called \"meters squared.\"  So, for an adult who is 1.75 m tall, the \"meters squared\" measurement is 1.75 m x 1.75 m, which equals 3.1 meters squared.  Divide the number of kilograms (your weight) by the meters squared number. In this example: 70 ÷ 3.1 = 22.6. This is your BMI.  What do the results mean?  BMI charts are used to see if you are underweight, normal weight, overweight, or obese. The following guidelines " will be used:  Underweight: BMI less than 18.5.  Normal weight: BMI between 18.5 and 24.9.  Overweight: BMI between 25 and 29.9.  Obese: BMI of 30 or above.  BMI is a tool and cannot diagnose a condition. Talk with your health care provider about what your BMI means for you. Keep these notes in mind:  Weight includes fat and muscle. Someone with a muscular build, such as an athlete, may have a BMI that is higher than 24.9. In cases like these, BMI is not a correct measure of body fat.  If you have a BMI of 25 or higher, your provider may need to do more testing to find out if excess body fat is the cause.  BMI is measured the same way for males and females. Females usually have more body fat than males of the same height and weight.  Where to find more information  For more information about BMI, including tools to quickly find your BMI, go to:  Centers for Disease Control and Prevention: cdc.gov  American Heart Association: heart.org  National Heart, Lung, and Blood [place]: nhlbi.nih.gov  This information is not intended to replace advice given to you by your health care provider. Make sure you discuss any questions you have with your health care provider.  Document Revised: 09/07/2023 Document Reviewed: 08/31/2023  [employer] Patient Education © 2024 [employer]  Advance Directive    Advance directives are legal documents that allow you to make decisions about your health care and medical treatment in case you become unable to communicate for yourself. Advance directives let your wishes be known to family, friends, and health care providers.  Discussing and writing advance directives should happen over time rather than all at once. Advance directives can be changed and updated at any time. There are different types of advance directives, such as:  Medical power of .  Living will.  Do not resuscitate (DNR) order or do not attempt resuscitation (DNAR) order.  Health care proxy and medical power of    A health care proxy is also called a health care agent. This person is appointed to make medical decisions for you when you are unable to make decisions for yourself. Generally, people ask a trusted friend or family member to act as their proxy and represent their preferences. Make sure you have an agreement with your trusted person to act as your proxy. A proxy may have to make a medical decision on your behalf if your wishes are not known.  A medical power of , also called a durable power of  for health care, is a legal document that names your health care proxy. Depending on the laws in your state, the document may need to be:  Signed.  Notarized.  Dated.  Copied.  Witnessed.  Incorporated into your medical record.  You may also want to appoint a trusted person to manage your money in the event you are unable to do so. This is called a durable power of  for finances. It is a separate legal document from the durable power of  for health care. You may choose your health care proxy or someone different to act as your agent in money matters.  If you do not appoint a proxy, or there is a concern that the proxy is not acting in your best interest, a court may appoint a guardian to act on your behalf.  Living will  A living will is a set of instructions that state your wishes about medical care when you cannot express them yourself. Health care providers should keep a copy of your living will in your medical record. You may want to give a copy to family members or friends. To alert caregivers in case of an emergency, you can place a card in your wallet to let them know that you have a living will and where they can find it. A living will is used if you become:  Terminally ill.  Disabled.  Unable to communicate or make decisions.  The following decisions should be included in your living will:  To use or not to use life support equipment, such as dialysis machines and breathing  machines (ventilators).  Whether you want a DNR or DNAR order. This tells health care providers not to use cardiopulmonary resuscitation (CPR) if breathing or heartbeat stops.  To use or not to use tube feeding.  To be given or not to be given food and fluids.  Whether you want comfort (palliative) care when the goal becomes comfort rather than a cure.  Whether you want to donate your organs and tissues.  A living will does not give instructions for distributing your money and property if you should pass away.  DNR or DNAR  A DNR or DNAR order is a request not to have CPR in the event that your heart stops beating or you stop breathing. If a DNR or DNAR order has not been made and shared, a health care provider will try to help any patient whose heart has stopped or who has stopped breathing. If you plan to have surgery, talk with your health care provider about how your DNR or DNAR order will be followed if problems occur.  What if I do not have an advance directive?  Some states assign family decision makers to act on your behalf if you do not have an advance directive. Each state has its own laws about advance directives. You may want to check with your health care provider, , or state representative about the laws in your state.  Summary  Advance directives are legal documents that allow you to make decisions about your health care and medical treatment in case you become unable to communicate for yourself.  The process of discussing and writing advance directives should happen over time. You can change and update advance directives at any time.  Advance directives may include a medical power of , a living will, and a DNR or DNAR order.  This information is not intended to replace advice given to you by your health care provider. Make sure you discuss any questions you have with your health care provider.  Document Revised: 09/21/2021 Document Reviewed: 09/21/2021  Elsevier Patient Education ©  "2024 [employer]  Smoking Tobacco Information, Adult  Smoking tobacco can be harmful to your health. Tobacco contains a toxic colorless chemical called nicotine. Nicotine causes changes in your brain that make you want more and more. This is called addiction. This can make it hard to stop smoking once you start. Tobacco also has other toxic chemicals that can hurt your body and raise your risk of many cancers.  Menthol or \"lite\" tobacco or cigarette brands are not safer than regular brands.  How can smoking tobacco affect me?  Smoking tobacco puts you at risk for:  Cancer. Smoking is most commonly associated with lung cancer, but can also lead to cancer in other parts of the body.  Chronic obstructive pulmonary disease (COPD). This is a long-term lung condition that makes it hard to breathe. It also gets worse over time.  High blood pressure (hypertension), heart disease, stroke, heart attack, and lung infections, such as pneumonia.  Cataracts. This is when the lenses in the eyes become clouded.  Digestive problems. This may include peptic ulcers, heartburn, and gastroesophageal reflux disease (GERD).  Oral health problems, such as gum disease, mouth sores, and tooth loss.  Loss of taste and smell.  Smoking also affects how you look and smell. Smoking may cause:  Wrinkles.  Yellow or stained teeth, fingers, and fingernails.  Bad breath.  Bad-smelling clothes and hair.  Smoking tobacco can also affect your social life, because:  It may be challenging to find places to smoke when away from home. Many workplaces, restaurants, hotels, and public places are tobacco-free.  Smoking is expensive. This is due to the cost of tobacco and the long-term costs of treating health problems from smoking.  Secondhand smoke may affect those around you. Secondhand smoke can cause lung cancer, breathing problems, and heart disease. Children of smokers have a higher risk for:  Sudden infant death syndrome (SIDS).  Ear infections.  Lung " infections.  What actions can I take to prevent health problems?  Quit smoking    Do not start smoking. Quit if you already smoke.  Do not replace cigarette smoking with vaping devices, such as e-cigarettes.  Make a plan to quit smoking and commit to it. Look for programs to help you, and ask your health care provider for recommendations and ideas. Set a date and write down all the reasons you want to quit.  Let your friends and family know you are quitting so they can help and support you. Consider finding friends who also want to quit. It can be easier to quit with someone else, so that you can support each other.  Talk with your health care provider about using nicotine replacement medicines to help you quit. These include gum, lozenges, patches, sprays, or pills.  If you try to quit but return to smoking, stay positive. It is common to slip up when you first quit, so take it one day at a time.  Be prepared for cravings. When you feel the urge to smoke, chew gum or suck on hard candy.  Lifestyle  Stay busy.  Take care of your body. Get plenty of exercise, eat a healthy diet, and drink plenty of water.  Find ways to manage your stress, such as meditation, yoga, exercise, or time spent with friends and family.  Ask your health care provider about having regular tests (screenings) to check for cancer. This may include blood tests, imaging tests, and other tests.  Where to find support  To get support to quit smoking, consider:  Asking your health care provider for more information and resources.  Joining a support group for people who want to quit smoking in your local community. There are many effective programs that may help you to quit.  Calling the smokefree.gov counselor helpline at 8-800-QUIT-NOW (1-248.773.9106).  Where to find more information  You may find more information about quitting smoking from:  Centers for Disease Control and Prevention: cdc.gov/tobacco  Smokefree.gov: smokefree.gov  American Lung  Association: freedomfromsmoking.org  Contact a health care provider if:  You have problems breathing.  Your lips, nose, or fingers turn blue.  You have chest pain.  You are coughing up blood.  You feel like you will faint.  You have other health changes that cause you to worry.  Summary  Smoking tobacco can negatively affect your health, the health of those around you, your finances, and your social life.  Do not start smoking. Quit if you already smoke. If you need help quitting, ask your health care provider.  Consider joining a support group for people in your local community who want to quit smoking. There are many effective programs that may help you to quit.  This information is not intended to replace advice given to you by your health care provider. Make sure you discuss any questions you have with your health care provider.  Document Revised: 12/13/2022 Document Reviewed: 12/13/2022  [employer] Patient Education © 2024 [employer]       Plan of care reviewed with patient at the conclusion of today's visit. Education was provided regarding diagnosis, management, and any prescribed or recommended OTC medications.Patient verbalizes understanding of and agreement with management plan.         I have seen Arie on this date of service:preparing for the visit, reviewing tests, obtaining and/or reviewing a separately obtained history, performing a medically appropriate examination and/or evaluation , counseling and educating the patient/family/caregiver, ordering medications, tests, or procedures, referring and communicating with other health care professionals , and documenting information in the medical record    Rosemary Long PA-C    Patient or patient representative verbalized consent for the use of Ambient Listening during the visit with  Rosemary Long PA-C for chart documentation. 11/15/2024  12:31 EST

## 2024-12-03 ENCOUNTER — LAB (OUTPATIENT)
Dept: LAB | Facility: HOSPITAL | Age: 44
End: 2024-12-03
Payer: COMMERCIAL

## 2024-12-03 DIAGNOSIS — R74.8 ABNORMAL LIVER ENZYMES: ICD-10-CM

## 2024-12-03 LAB
ALBUMIN SERPL-MCNC: 3.9 G/DL (ref 3.5–5.2)
ALBUMIN/GLOB SERPL: 1.3 G/DL
ALP SERPL-CCNC: 66 U/L (ref 39–117)
ALT SERPL W P-5'-P-CCNC: 59 U/L (ref 1–41)
ANION GAP SERPL CALCULATED.3IONS-SCNC: 15.4 MMOL/L (ref 5–15)
AST SERPL-CCNC: 37 U/L (ref 1–40)
BILIRUB SERPL-MCNC: 1 MG/DL (ref 0–1.2)
BUN SERPL-MCNC: 13 MG/DL (ref 6–20)
BUN/CREAT SERPL: 15.7 (ref 7–25)
CALCIUM SPEC-SCNC: 9.1 MG/DL (ref 8.6–10.5)
CHLORIDE SERPL-SCNC: 102 MMOL/L (ref 98–107)
CO2 SERPL-SCNC: 22.6 MMOL/L (ref 22–29)
CREAT SERPL-MCNC: 0.83 MG/DL (ref 0.76–1.27)
EGFRCR SERPLBLD CKD-EPI 2021: 110 ML/MIN/1.73
GLOBULIN UR ELPH-MCNC: 3 GM/DL
GLUCOSE SERPL-MCNC: 74 MG/DL (ref 65–99)
POTASSIUM SERPL-SCNC: 3.9 MMOL/L (ref 3.5–5.2)
PROT SERPL-MCNC: 6.9 G/DL (ref 6–8.5)
SODIUM SERPL-SCNC: 140 MMOL/L (ref 136–145)

## 2024-12-03 PROCEDURE — 80053 COMPREHEN METABOLIC PANEL: CPT

## 2025-01-14 DIAGNOSIS — E55.9 VITAMIN D DEFICIENCY: ICD-10-CM

## 2025-01-14 RX ORDER — ERGOCALCIFEROL 1.25 MG/1
50000 CAPSULE, LIQUID FILLED ORAL WEEKLY
Qty: 4 CAPSULE | Refills: 2 | Status: SHIPPED | OUTPATIENT
Start: 2025-01-14

## 2025-01-14 NOTE — TELEPHONE ENCOUNTER
Rx Refill Note  Requested Prescriptions     Pending Prescriptions Disp Refills    vitamin D (ERGOCALCIFEROL) 1.25 MG (13321 UT) capsule capsule [Pharmacy Med Name: VIT D2 1.25MG (50,000U) CAP 1.25 MG Capsule] 4 capsule 2     Sig: TAKE 1 CAPSULE BY MOUTH 1 (ONE) TIME PER WEEK.      Last office visit with prescribing clinician: 11/15/2024   Last telemedicine visit with prescribing clinician: Visit date not found   Next office visit with prescribing clinician: 1/16/2025                         Would you like a call back once the refill request has been completed: [] Yes [] No    If the office needs to give you a call back, can they leave a voicemail: [] Yes [] No    Alejandra Herron MA  01/14/25, 09:42 EST

## 2025-01-24 ENCOUNTER — LAB (OUTPATIENT)
Dept: LAB | Facility: HOSPITAL | Age: 45
End: 2025-01-24
Payer: COMMERCIAL

## 2025-01-24 DIAGNOSIS — Z13.220 LIPID SCREENING: ICD-10-CM

## 2025-01-24 DIAGNOSIS — R74.8 ABNORMAL LIVER ENZYMES: Primary | ICD-10-CM

## 2025-01-24 DIAGNOSIS — R74.8 ABNORMAL LIVER ENZYMES: ICD-10-CM

## 2025-01-24 LAB
ALBUMIN SERPL-MCNC: 4.3 G/DL (ref 3.5–5.2)
ALBUMIN/GLOB SERPL: 1.5 G/DL
ALP SERPL-CCNC: 73 U/L (ref 39–117)
ALT SERPL W P-5'-P-CCNC: 116 U/L (ref 1–41)
ANION GAP SERPL CALCULATED.3IONS-SCNC: 12 MMOL/L (ref 5–15)
AST SERPL-CCNC: 108 U/L (ref 1–40)
BASOPHILS # BLD AUTO: 0.06 10*3/MM3 (ref 0–0.2)
BASOPHILS NFR BLD AUTO: 0.9 % (ref 0–1.5)
BILIRUB SERPL-MCNC: 0.9 MG/DL (ref 0–1.2)
BUN SERPL-MCNC: 11 MG/DL (ref 6–20)
BUN/CREAT SERPL: 12.9 (ref 7–25)
CALCIUM SPEC-SCNC: 9.3 MG/DL (ref 8.6–10.5)
CHLORIDE SERPL-SCNC: 100 MMOL/L (ref 98–107)
CHOLEST SERPL-MCNC: 208 MG/DL (ref 0–200)
CO2 SERPL-SCNC: 24 MMOL/L (ref 22–29)
CREAT SERPL-MCNC: 0.85 MG/DL (ref 0.76–1.27)
DEPRECATED RDW RBC AUTO: 42.9 FL (ref 37–54)
EGFRCR SERPLBLD CKD-EPI 2021: 109.2 ML/MIN/1.73
EOSINOPHIL # BLD AUTO: 0.12 10*3/MM3 (ref 0–0.4)
EOSINOPHIL NFR BLD AUTO: 1.7 % (ref 0.3–6.2)
ERYTHROCYTE [DISTWIDTH] IN BLOOD BY AUTOMATED COUNT: 12.2 % (ref 12.3–15.4)
GGT SERPL-CCNC: 64 U/L (ref 8–61)
GLOBULIN UR ELPH-MCNC: 2.9 GM/DL
GLUCOSE SERPL-MCNC: 85 MG/DL (ref 65–99)
HCT VFR BLD AUTO: 47.7 % (ref 37.5–51)
HDLC SERPL-MCNC: 83 MG/DL (ref 40–60)
HGB BLD-MCNC: 15.8 G/DL (ref 13–17.7)
IMM GRANULOCYTES # BLD AUTO: 0.02 10*3/MM3 (ref 0–0.05)
IMM GRANULOCYTES NFR BLD AUTO: 0.3 % (ref 0–0.5)
LDLC SERPL CALC-MCNC: 112 MG/DL (ref 0–100)
LDLC/HDLC SERPL: 1.32 {RATIO}
LYMPHOCYTES # BLD AUTO: 1.65 10*3/MM3 (ref 0.7–3.1)
LYMPHOCYTES NFR BLD AUTO: 23.4 % (ref 19.6–45.3)
MCH RBC QN AUTO: 31.6 PG (ref 26.6–33)
MCHC RBC AUTO-ENTMCNC: 33.1 G/DL (ref 31.5–35.7)
MCV RBC AUTO: 95.4 FL (ref 79–97)
MONOCYTES # BLD AUTO: 0.73 10*3/MM3 (ref 0.1–0.9)
MONOCYTES NFR BLD AUTO: 10.4 % (ref 5–12)
NEUTROPHILS NFR BLD AUTO: 4.46 10*3/MM3 (ref 1.7–7)
NEUTROPHILS NFR BLD AUTO: 63.3 % (ref 42.7–76)
NRBC BLD AUTO-RTO: 0 /100 WBC (ref 0–0.2)
PLATELET # BLD AUTO: 242 10*3/MM3 (ref 140–450)
PMV BLD AUTO: 10.8 FL (ref 6–12)
POTASSIUM SERPL-SCNC: 4.3 MMOL/L (ref 3.5–5.2)
PROT SERPL-MCNC: 7.2 G/DL (ref 6–8.5)
RBC # BLD AUTO: 5 10*6/MM3 (ref 4.14–5.8)
SODIUM SERPL-SCNC: 136 MMOL/L (ref 136–145)
TRIGL SERPL-MCNC: 76 MG/DL (ref 0–150)
VLDLC SERPL-MCNC: 13 MG/DL (ref 5–40)
WBC NRBC COR # BLD AUTO: 7.04 10*3/MM3 (ref 3.4–10.8)

## 2025-01-24 PROCEDURE — 36415 COLL VENOUS BLD VENIPUNCTURE: CPT

## 2025-01-24 PROCEDURE — 80053 COMPREHEN METABOLIC PANEL: CPT

## 2025-01-24 PROCEDURE — 85025 COMPLETE CBC W/AUTO DIFF WBC: CPT

## 2025-01-24 PROCEDURE — 80061 LIPID PANEL: CPT

## 2025-01-24 PROCEDURE — 82977 ASSAY OF GGT: CPT

## 2025-01-26 DIAGNOSIS — R74.8 ABNORMAL LIVER ENZYMES: Primary | ICD-10-CM

## 2025-01-31 ENCOUNTER — OFFICE VISIT (OUTPATIENT)
Dept: INTERNAL MEDICINE | Facility: CLINIC | Age: 45
End: 2025-01-31
Payer: COMMERCIAL

## 2025-01-31 VITALS
HEART RATE: 80 BPM | WEIGHT: 230.6 LBS | HEIGHT: 72 IN | OXYGEN SATURATION: 100 % | DIASTOLIC BLOOD PRESSURE: 110 MMHG | SYSTOLIC BLOOD PRESSURE: 146 MMHG | BODY MASS INDEX: 31.23 KG/M2

## 2025-01-31 DIAGNOSIS — G47.33 OBSTRUCTIVE SLEEP APNEA SYNDROME: ICD-10-CM

## 2025-01-31 DIAGNOSIS — I10 PRIMARY HYPERTENSION: Primary | ICD-10-CM

## 2025-01-31 DIAGNOSIS — R74.8 ELEVATED LIVER ENZYMES: ICD-10-CM

## 2025-01-31 PROCEDURE — 99214 OFFICE O/P EST MOD 30 MIN: CPT | Performed by: PHYSICIAN ASSISTANT

## 2025-01-31 RX ORDER — LISINOPRIL 5 MG/1
5 TABLET ORAL DAILY
Qty: 30 TABLET | Refills: 1 | Status: SHIPPED | OUTPATIENT
Start: 2025-01-31

## 2025-01-31 NOTE — PATIENT INSTRUCTIONS

## 2025-01-31 NOTE — PROGRESS NOTES
Office Note     Name: Arie Hayden    : 1980     MRN: [id number]     Chief Complaint  Results (Labs follow-up)    Subjective     History of Present Illness:  Arie Hayden is a 44 y.o. male who presents today to discuss lab results and blood pressure.    History of Present Illness  The patient presents for evaluation of elevated blood pressure, elevated liver enzymes, and weight management.    He reports an increase in his blood pressure, which he attributes to a stressful morning event involving his children approximately 2 hours prior to the visit. He has been monitoring his blood pressure since a previous elevation in 2024. He had a good night's sleep last night.    He has experienced weight gain despite regular gym attendance, noting a 5-pound increase since his last weigh-in at the gym yesterday. His current weight is 230 pounds, the highest he has ever recorded. He does not report any constipation. He reports a lack of appetite and expresses concern about potential side effects of injectable medications. He recalls a previous trial of a medication that did not significantly reduce his alcohol consumption. He is considering a new trial of this medication. He reports no abdominal pain. He has been contacted to schedule an ultrasound. He has a history of appendectomy but retains his gallbladder.    He underwent a colonoscopy the day before his lab tests were conducted and is curious if the procedure or the associated medications could have influenced his liver enzyme levels. He continues to take ibuprofen and Tylenol as needed and maintains his usual alcohol intake.    SOCIAL HISTORY  The patient admits to drinking alcohol regularly.      Review of Systems:   Review of Systems   Constitutional:  Positive for unexpected weight gain. Negative for activity change, appetite change, diaphoresis, fatigue and unexpected weight loss.   HENT:  Negative for hearing loss.    Eyes:  Negative for  visual disturbance.   Respiratory:  Negative for chest tightness and shortness of breath.    Cardiovascular:  Negative for chest pain, palpitations and leg swelling.   Gastrointestinal:  Negative for abdominal pain, blood in stool, GERD and indigestion.   Endocrine: Negative for cold intolerance and heat intolerance.   Genitourinary:  Negative for dysuria and hematuria.   Musculoskeletal:  Negative for arthralgias and myalgias.   Skin:  Negative for skin lesions.   Neurological:  Negative for tremors, seizures, syncope, speech difficulty, weakness, headache, memory problem and confusion.   Hematological:  Does not bruise/bleed easily.   Psychiatric/Behavioral:  Negative for sleep disturbance and depressed mood. The patient is not nervous/anxious.        Past Medical History:   Past Medical History:   Diagnosis Date    Allergic rhinitis     Colon polyp     COVID     Disease of thyroid gland     HYPO    Diverticulitis 01/15/2019    Diverticulosis     Fatigue     Low back pain     Rosacea     Scoliosis     Sleep apnea     DOES NOT USE CPAP       Past Surgical History:   Past Surgical History:   Procedure Laterality Date    APPENDECTOMY      COLON RESECTION N/A 05/06/2019    Procedure: LOWER ANTERIOR RESECTION, APPENDECTOMY, TAKEDOWN OF SPLENIC FLEXURE, EXCISION AND CLOSURE OF COLOCECAL FISTULA, UMBILICAL HERNIA REPAIR, AND PROCTOSCOPY;  Surgeon: Mitchel Beck MD;  Location:  [facility];  Service: General    COLONOSCOPY      CT ABSCESS DRAIN CYST ASP      X2    TONSILLECTOMY  1987    VASECTOMY  2011    VENTRAL/INCISIONAL HERNIA REPAIR N/A 12/4/2023    Procedure: OPEN INCISIONAL HERNIA REPAIR WITH MESH;  Surgeon: Royer Catalan MD;  Location:  [facility];  Service: General;  Laterality: N/A;    WISDOM TOOTH EXTRACTION         Family History:   Family History   Problem Relation Age of Onset    Factor V Leiden deficiency Father     Suicidality Father     Factor IX deficiency Father     Depression Father          Suicide    Breast cancer Maternal Grandmother     Cancer Maternal Grandmother         Breast    Lung cancer Maternal Grandfather     Cancer Maternal Grandfather         Lung    Factor V Leiden deficiency Paternal Grandfather     Prostate cancer Paternal Grandfather     Cancer Paternal Grandfather         Prostate    Hyperlipidemia Mother     Hypothyroidism Mother     No Known Problems Sister     No Known Problems Brother     No Known Problems Daughter     No Known Problems Daughter     Hyperlipidemia Maternal Uncle        Social History:   Social History     Socioeconomic History    Marital status:    Tobacco Use    Smoking status: Every Day     Current packs/day: 0.25     Average packs/day: 0.3 packs/day for 20.9 years (5.2 ttl pk-yrs)     Types: Cigarettes     Start date: 1/8/2004     Last attempt to quit: 11/20/2023    Smokeless tobacco: Former    Tobacco comments:     Light smoker, quit after surgery in 11/23 but started again   Vaping Use    Vaping status: Never Used   Substance and Sexual Activity    Alcohol use: Yes     Alcohol/week: 10.0 standard drinks of alcohol     Types: 10 Drinks containing 0.5 oz of alcohol per week     Comment: 8-10/week    Drug use: No    Sexual activity: Yes     Partners: Female     Birth control/protection: Vasectomy, Hysterectomy       Immunizations:   Immunization History   Administered Date(s) Administered    COVID-19 (BREA) 11/15/2020    Fluzone  >6mos 10/24/2016, 11/27/2017, 11/15/2024    Fluzone (or Fluarix & Flulaval for VFC) >6mos 12/09/2021, 10/31/2022, 10/12/2023    Hepatitis A 09/17/2020    Influenza Injectable Mdck Pf Quad 12/09/2021, 10/31/2022    Pneumococcal Conjugate 20-Valent (PCV20) 10/12/2023    Tdap 09/17/2020        Medications:     Current Outpatient Medications:     levothyroxine (SYNTHROID, LEVOTHROID) 25 MCG tablet, Take 1 tablet by mouth Every Morning., Disp: 90 tablet, Rfl: 1    Seysara 150 MG tablet, Take 1 tablet by mouth 3 (Three) Times a  "Week., Disp: , Rfl:     vitamin D (ERGOCALCIFEROL) 1.25 MG (49078 UT) capsule capsule, TAKE 1 CAPSULE BY MOUTH 1 (ONE) TIME PER WEEK., Disp: 4 capsule, Rfl: 2    lisinopril (PRINIVIL,ZESTRIL) 5 MG tablet, Take 1 tablet by mouth Daily., Disp: 30 tablet, Rfl: 1    Tirzepatide-Weight Management (ZEPBOUND) 2.5 MG/0.5ML solution auto-injector, Inject 0.5 mL under the skin into the appropriate area as directed 1 (One) Time Per Week., Disp: 2 mL, Rfl: 1    Allergies:   Allergies   Allergen Reactions    Pollen Extract Other (See Comments)     Sinus related       Objective     Vital Signs  BP (!) 146/110 (BP Location: Right arm, Patient Position: Sitting, Cuff Size: Adult)   Pulse 80   Ht 182.9 cm (72.01\")   Wt 105 kg (230 lb 9.6 oz)   SpO2 100%   BMI 31.27 kg/m²   Body mass index is 31.27 kg/m².     BMI is >= 30 and <35. (Class 1 Obesity). The following options were offered after discussion;: weight loss educational material (shared in after visit summary), exercise counseling/recommendations, and nutrition counseling/recommendations       Physical Exam  Vitals reviewed.   Constitutional:       Appearance: Normal appearance. He is well-developed.   HENT:      Head: Normocephalic and atraumatic.      Right Ear: Hearing, tympanic membrane, ear canal and external ear normal.      Left Ear: Hearing, tympanic membrane, ear canal and external ear normal.      Nose: Nose normal.      Mouth/Throat:      Pharynx: Uvula midline.   Eyes:      General: Lids are normal.      Conjunctiva/sclera: Conjunctivae normal.      Pupils: Pupils are equal, round, and reactive to light.   Cardiovascular:      Rate and Rhythm: Normal rate and regular rhythm.      Heart sounds: Normal heart sounds.   Pulmonary:      Effort: Pulmonary effort is normal.      Breath sounds: Normal breath sounds.   Abdominal:      General: Bowel sounds are normal.      Palpations: Abdomen is soft.   Musculoskeletal:         General: Normal range of motion.      " Cervical back: Full passive range of motion without pain, normal range of motion and neck supple.   Skin:     General: Skin is warm and dry.   Neurological:      Mental Status: He is alert and oriented to person, place, and time.      Deep Tendon Reflexes: Reflexes are normal and symmetric.   Psychiatric:         Speech: Speech normal.         Behavior: Behavior normal.         Thought Content: Thought content normal.         Judgment: Judgment normal.       Procedures     Results:  No results found for this or any previous visit (from the past 24 hours).     Assessment and Plan     Assessment/Plan:  Diagnoses and all orders for this visit:    1. Primary hypertension (Primary)  -     lisinopril (PRINIVIL,ZESTRIL) 5 MG tablet; Take 1 tablet by mouth Daily.  Dispense: 30 tablet; Refill: 1    2. Obstructive sleep apnea syndrome  -     Tirzepatide-Weight Management (ZEPBOUND) 2.5 MG/0.5ML solution auto-injector; Inject 0.5 mL under the skin into the appropriate area as directed 1 (One) Time Per Week.  Dispense: 2 mL; Refill: 1    3. BMI 31.0-31.9,adult  -     Tirzepatide-Weight Management (ZEPBOUND) 2.5 MG/0.5ML solution auto-injector; Inject 0.5 mL under the skin into the appropriate area as directed 1 (One) Time Per Week.  Dispense: 2 mL; Refill: 1    4. Elevated liver enzymes        Assessment & Plan  1.  Primary hypertension.  His blood pressure readings have been consistently high, with a notable increase since July 2024. Today's reading was 130/90. He reports a stressful morning, which may have contributed to the elevated reading. He will be started on lisinopril 5 mg, the lowest dose, to help manage his blood pressure. Potential side effects, including cough, were discussed. He was advised to monitor his blood pressure regularly and report any significant changes.    2. Elevated liver enzymes.  His liver enzymes have shown a significant increase, which could be due to various factors, including medications,  alcohol intake, or underlying conditions such as fatty liver disease. He reports no changes in his alcohol consumption since the last test. An order for a liver ultrasound has been placed to further investigate the cause. He was advised to schedule the ultrasound and follow up with the results. A hepatitis panel will also be conducted to rule out hepatitis.    3.  BMI 31.0-31.9, adult.  He has experienced weight gain, currently weighing 230 pounds, which is the highest he has ever been. He reports no appetite and has not been successful with previous weight loss attempts. He will be started on Zepbound, pending insurance approval, to aid in weight loss and reduce inflammation. He was advised to consult with a nutritionist to develop a healthy eating plan. He was also encouraged to continue his gym routine and monitor his weight.    Follow-up  The patient will follow up in 1 month.    PROCEDURE  The patient underwent a colonoscopy the day before his lab tests were conducted.     Follow Up  Return in about 4 weeks (around 2/28/2025).    Patient or patient representative verbalized consent for the use of Ambient Listening during the visit with  Rosemary Long PA-C for chart documentation. 1/31/2025  16:40 EST      Rosemary Long PA-C   [facility] [place] [id number]

## 2025-02-05 ENCOUNTER — HOSPITAL ENCOUNTER (OUTPATIENT)
Dept: ULTRASOUND IMAGING | Facility: HOSPITAL | Age: 45
Discharge: HOME | End: 2025-02-05
Admitting: PHYSICIAN ASSISTANT
Payer: COMMERCIAL

## 2025-02-05 DIAGNOSIS — R74.8 ABNORMAL LIVER ENZYMES: ICD-10-CM

## 2025-02-05 PROCEDURE — 76705 ECHO EXAM OF ABDOMEN: CPT

## 2025-02-25 ENCOUNTER — LAB (OUTPATIENT)
Dept: LAB | Facility: HOSPITAL | Age: 45
End: 2025-02-25
Payer: COMMERCIAL

## 2025-02-25 DIAGNOSIS — R74.8 ABNORMAL LIVER ENZYMES: ICD-10-CM

## 2025-02-25 PROCEDURE — 80074 ACUTE HEPATITIS PANEL: CPT

## 2025-02-25 PROCEDURE — 36415 COLL VENOUS BLD VENIPUNCTURE: CPT

## 2025-02-26 LAB
HAV IGM SERPL QL IA: NORMAL
HBV CORE IGM SERPL QL IA: NORMAL
HBV SURFACE AG SERPL QL IA: NORMAL
HCV AB SER QL: NORMAL

## 2025-03-05 ENCOUNTER — OFFICE VISIT (OUTPATIENT)
Dept: INTERNAL MEDICINE | Facility: CLINIC | Age: 45
End: 2025-03-05
Payer: COMMERCIAL

## 2025-03-05 VITALS
HEIGHT: 72 IN | DIASTOLIC BLOOD PRESSURE: 74 MMHG | WEIGHT: 228.8 LBS | HEART RATE: 68 BPM | OXYGEN SATURATION: 98 % | TEMPERATURE: 98 F | BODY MASS INDEX: 30.99 KG/M2 | SYSTOLIC BLOOD PRESSURE: 122 MMHG

## 2025-03-05 DIAGNOSIS — R74.8 ELEVATED LIVER ENZYMES: ICD-10-CM

## 2025-03-05 DIAGNOSIS — E55.9 VITAMIN D DEFICIENCY: ICD-10-CM

## 2025-03-05 DIAGNOSIS — I10 PRIMARY HYPERTENSION: Primary | ICD-10-CM

## 2025-03-05 DIAGNOSIS — E03.9 HYPOTHYROIDISM (ACQUIRED): ICD-10-CM

## 2025-03-05 PROCEDURE — 99214 OFFICE O/P EST MOD 30 MIN: CPT | Performed by: PHYSICIAN ASSISTANT

## 2025-03-05 RX ORDER — LEVOTHYROXINE SODIUM 25 UG/1
25 TABLET ORAL
Qty: 90 TABLET | Refills: 1 | Status: SHIPPED | OUTPATIENT
Start: 2025-03-05

## 2025-03-05 RX ORDER — LOSARTAN POTASSIUM 25 MG/1
25 TABLET ORAL DAILY
Qty: 30 TABLET | Refills: 1 | Status: SHIPPED | OUTPATIENT
Start: 2025-03-05

## 2025-03-05 NOTE — PROGRESS NOTES
Office Note     Name: Arie Hayden    : 1980     MRN: [id number]     Chief Complaint  Hypertension (1 month follow-up)    Subjective     History of Present Illness:  Arie Hayden is a 44 y.o. male who presents today for hypertension, fatty liver, sleep apnea, and hypothyroidism.    History of Present Illness  The patient presents for evaluation of hypertension, fatty liver, sleep apnea, and thyroid management.    He has been experiencing a mild cough over the past 2 to 3 weeks, which he attributes to his current antihypertensive medication, lisinopril. He reports that the cough is triggered by the need for hydration. He has not yet completed his prescribed course of lisinopril.    He has undergone blood work for hepatitis but has not received the results. He suspects that his liver enzymes may have been elevated due to alcohol consumption the day prior to the test. He recalls a previous instance where his liver enzymes were slightly elevated, followed by a significant spike. He has made efforts to reduce his alcohol intake over the past 3 weeks. He has not initiated any injections due to insurance non-approval. He has been engaging in regular exercise, including elliptical training, and has achieved a weight loss of approximately 2 pounds.    He has a history of sleep apnea and has attempted to use a CPAP machine on 6 different occasions, but found it uncomfortable. He was referred to a sleep specialist approximately a year ago, but the referral has since . He acknowledges the need for weight loss.    He underwent an eye examination a month ago, during which a small bleed was detected in one of his eyes. He has a scheduled follow-up appointment in the coming weeks. The ophthalmologist suggested that the bleed could be a result of high blood pressure.    He is currently on levothyroxine for thyroid management and has a few doses remaining. He requests a refill of this  medication.    SOCIAL HISTORY  The patient admits to drinking alcohol but has cut back in the last 3 weeks.    MEDICATIONS  Current: Lisinopril, levothyroxine    Review of Systems:   Review of Systems   Eyes:  Negative for blurred vision.   Respiratory:  Negative for shortness of breath.    Cardiovascular:  Negative for chest pain and palpitations.       Past Medical History:   Past Medical History:   Diagnosis Date    Allergic rhinitis     Colon polyp     COVID     Disease of thyroid gland     HYPO    Diverticulitis 01/15/2019    Diverticulosis     Fatigue     Low back pain     Rosacea     Scoliosis     Sleep apnea     DOES NOT USE CPAP       Past Surgical History:   Past Surgical History:   Procedure Laterality Date    APPENDECTOMY      COLON RESECTION N/A 05/06/2019    Procedure: LOWER ANTERIOR RESECTION, APPENDECTOMY, TAKEDOWN OF SPLENIC FLEXURE, EXCISION AND CLOSURE OF COLOCECAL FISTULA, UMBILICAL HERNIA REPAIR, AND PROCTOSCOPY;  Surgeon: Mitchel Beck MD;  Location:  [facility];  Service: General    COLONOSCOPY      CT ABSCESS DRAIN CYST ASP      X2    TONSILLECTOMY  1987    VASECTOMY  2011    VENTRAL/INCISIONAL HERNIA REPAIR N/A 12/4/2023    Procedure: OPEN INCISIONAL HERNIA REPAIR WITH MESH;  Surgeon: Royer Catalan MD;  Location:  [facility];  Service: General;  Laterality: N/A;    WISDOM TOOTH EXTRACTION         Family History:   Family History   Problem Relation Age of Onset    Factor V Leiden deficiency Father     Suicidality Father     Factor IX deficiency Father     Depression Father         Suicide    Breast cancer Maternal Grandmother     Cancer Maternal Grandmother         Breast    Lung cancer Maternal Grandfather     Cancer Maternal Grandfather         Lung    Factor V Leiden deficiency Paternal Grandfather     Prostate cancer Paternal Grandfather     Cancer Paternal Grandfather         Prostate    Hyperlipidemia Mother     Hypothyroidism Mother     No Known Problems Sister     No Known  Problems Brother     No Known Problems Daughter     No Known Problems Daughter     Hyperlipidemia Maternal Uncle        Social History:   Social History     Socioeconomic History    Marital status:    Tobacco Use    Smoking status: Every Day     Current packs/day: 0.25     Average packs/day: 0.3 packs/day for 21.0 years (5.3 ttl pk-yrs)     Types: Cigarettes     Start date: 1/8/2004     Last attempt to quit: 11/20/2023    Smokeless tobacco: Former    Tobacco comments:     Light smoker, quit after surgery in 11/23 but started again   Vaping Use    Vaping status: Never Used   Substance and Sexual Activity    Alcohol use: Yes     Alcohol/week: 10.0 standard drinks of alcohol     Types: 10 Drinks containing 0.5 oz of alcohol per week     Comment: 8-10/week    Drug use: No    Sexual activity: Yes     Partners: Female     Birth control/protection: Vasectomy, Hysterectomy       Immunizations:   Immunization History   Administered Date(s) Administered    COVID-19 ([email or web address]) 11/15/2020    Fluzone  >6mos 10/24/2016, 11/27/2017, 11/15/2024    Fluzone (or Fluarix & Flulaval for VFC) >6mos 12/09/2021, 10/31/2022, 10/12/2023    Hepatitis A 09/17/2020    Influenza Injectable Mdck Pf Quad 12/09/2021, 10/31/2022    Pneumococcal Conjugate 20-Valent (PCV20) 10/12/2023    Tdap 09/17/2020        Medications:     Current Outpatient Medications:     levothyroxine (SYNTHROID, LEVOTHROID) 25 MCG tablet, Take 1 tablet by mouth Every Morning., Disp: 90 tablet, Rfl: 1    Seysara 150 MG tablet, Take 1 tablet by mouth 3 (Three) Times a Week., Disp: , Rfl:     Tirzepatide-Weight Management (ZEPBOUND) 2.5 MG/0.5ML solution auto-injector, Inject 0.5 mL under the skin into the appropriate area as directed 1 (One) Time Per Week., Disp: 2 mL, Rfl: 1    vitamin D (ERGOCALCIFEROL) 1.25 MG (83157 UT) capsule capsule, TAKE 1 CAPSULE BY MOUTH 1 (ONE) TIME PER WEEK., Disp: 4 capsule, Rfl: 2    losartan (Cozaar) 25 MG tablet, Take 1 tablet by mouth  "Daily., Disp: 30 tablet, Rfl: 1    Allergies:   Allergies   Allergen Reactions    Pollen Extract Other (See Comments)     Sinus related       Objective     Vital Signs  /74 (BP Location: Right arm, Patient Position: Sitting, Cuff Size: Adult)   Pulse 68   Temp 98 °F (36.7 °C) (Temporal)   Ht 182.9 cm (72.01\")   Wt 104 kg (228 lb 12.8 oz)   SpO2 98%   BMI 31.02 kg/m²   Body mass index is 31.02 kg/m².     BMI is >= 30 and <35. (Class 1 Obesity). The following options were offered after discussion;: weight loss educational material (shared in after visit summary), exercise counseling/recommendations, and nutrition counseling/recommendations       Physical Exam  Vitals reviewed.   Constitutional:       Appearance: Normal appearance. He is well-developed.   HENT:      Head: Normocephalic and atraumatic.      Right Ear: Hearing, tympanic membrane, ear canal and external ear normal.      Left Ear: Hearing, tympanic membrane, ear canal and external ear normal.      Nose: Nose normal.      Mouth/Throat:      Pharynx: Uvula midline.   Eyes:      General: Lids are normal.      Conjunctiva/sclera: Conjunctivae normal.      Pupils: Pupils are equal, round, and reactive to light.   Cardiovascular:      Rate and Rhythm: Normal rate and regular rhythm.      Heart sounds: Normal heart sounds.   Pulmonary:      Effort: Pulmonary effort is normal.      Breath sounds: Normal breath sounds.   Abdominal:      General: Bowel sounds are normal.      Palpations: Abdomen is soft.   Musculoskeletal:         General: Normal range of motion.      Cervical back: Full passive range of motion without pain, normal range of motion and neck supple.   Skin:     General: Skin is warm and dry.   Neurological:      Mental Status: He is alert and oriented to person, place, and time.      Deep Tendon Reflexes: Reflexes are normal and symmetric.   Psychiatric:         Speech: Speech normal.         Behavior: Behavior normal.         Thought " Content: Thought content normal.         Judgment: Judgment normal.        Procedures     Results:  No results found for this or any previous visit (from the past 24 hours).     Assessment and Plan     Assessment/Plan:  Diagnoses and all orders for this visit:    1. Primary hypertension (Primary)  -     losartan (Cozaar) 25 MG tablet; Take 1 tablet by mouth Daily.  Dispense: 30 tablet; Refill: 1  -     Comprehensive Metabolic Panel; Future    2. Vitamin D deficiency  Overview:  Continue vitamin D 50,000 units weekly.    Orders:  -     Vitamin D,25-Hydroxy; Future    3. Hypothyroidism (acquired)  Overview:  Continue levothyroxine 25 mcg daily.    Orders:  -     TSH; Future  -     T4, Free; Future  -     T3, Free; Future  -     levothyroxine (SYNTHROID, LEVOTHROID) 25 MCG tablet; Take 1 tablet by mouth Every Morning.  Dispense: 90 tablet; Refill: 1    4. Elevated liver enzymes        Assessment & Plan  1. Hypertension.  His blood pressure has shown significant improvement, but he has experienced a mild cough, a potential side effect of lisinopril. To address this, his medication will be switched from lisinopril to losartan 25 mg. He is advised to monitor for any persistent cough.    2. Fatty liver.  His liver enzymes were previously elevated, and an ultrasound confirmed fatty liver. He has reduced his alcohol intake over the past 3 weeks and has been exercising, resulting in a 2-pound weight loss. A referral to a gastroenterologist has been made for further evaluation and potential additional testing. He is advised to continue reducing alcohol consumption and maintain a healthy diet.    3. Sleep apnea.  He has a history of sleep apnea and has not used a CPAP machine recently. He is advised to undergo an updated sleep study to confirm the diagnosis, which may help with insurance coverage for weight loss medications. He is also encouraged to continue weight loss efforts, as this may improve his sleep apnea.    4. Ocular  hemorrhage.  He had an eye exam a month ago, which revealed a small bleed, likely due to elevated blood pressure. His blood sugar levels and A1c have been normal. He is advised to continue monitoring his blood pressure and follow up with his eye doctor as scheduled.    5. Thyroid management.  His thyroid function was improving as of November 2024. He will continue his current levothyroxine regimen. A prescription refill for levothyroxine has been provided. He is advised to have his thyroid levels rechecked next week.    Follow-up  The patient will follow up in 3 months.     <   Follow Up  Return in about 3 months (around 6/5/2025).    Patient or patient representative verbalized consent for the use of Ambient Listening during the visit with  Rosemary Long PA-C for chart documentation. 3/5/2025  12:49 EST      Rosemary Long PA-C   [facility] [place] [address]  Answers submitted by the patient for this visit:  High Blood Pressure Questionnaire (Submitted on 3/5/2025)  Chief Complaint: Hypertension  Chronicity: recurrent  Onset: more than 1 year ago  Progression since onset: improved  anxiety: No  headaches: No  malaise/fatigue: No  orthopnea: No  peripheral edema: No  Agents associated with hypertension: thyroid hormones

## 2025-03-13 ENCOUNTER — LAB (OUTPATIENT)
Dept: LAB | Facility: HOSPITAL | Age: 45
End: 2025-03-13
Payer: COMMERCIAL

## 2025-03-13 DIAGNOSIS — E55.9 VITAMIN D DEFICIENCY: ICD-10-CM

## 2025-03-13 DIAGNOSIS — E03.9 HYPOTHYROIDISM (ACQUIRED): ICD-10-CM

## 2025-03-13 DIAGNOSIS — I10 PRIMARY HYPERTENSION: ICD-10-CM

## 2025-03-13 LAB
25(OH)D3 SERPL-MCNC: 52.1 NG/ML (ref 30–100)
ALBUMIN SERPL-MCNC: 4 G/DL (ref 3.5–5.2)
ALBUMIN/GLOB SERPL: 1.4 G/DL
ALP SERPL-CCNC: 54 U/L (ref 39–117)
ALT SERPL W P-5'-P-CCNC: 73 U/L (ref 1–41)
ANION GAP SERPL CALCULATED.3IONS-SCNC: 13.5 MMOL/L (ref 5–15)
AST SERPL-CCNC: 57 U/L (ref 1–40)
BILIRUB SERPL-MCNC: 0.6 MG/DL (ref 0–1.2)
BUN SERPL-MCNC: 9 MG/DL (ref 6–20)
BUN/CREAT SERPL: 10.8 (ref 7–25)
CALCIUM SPEC-SCNC: 9.5 MG/DL (ref 8.6–10.5)
CHLORIDE SERPL-SCNC: 103 MMOL/L (ref 98–107)
CO2 SERPL-SCNC: 25.5 MMOL/L (ref 22–29)
CREAT SERPL-MCNC: 0.83 MG/DL (ref 0.76–1.27)
EGFRCR SERPLBLD CKD-EPI 2021: 110 ML/MIN/1.73
GLOBULIN UR ELPH-MCNC: 2.8 GM/DL
GLUCOSE SERPL-MCNC: 80 MG/DL (ref 65–99)
POTASSIUM SERPL-SCNC: 4.1 MMOL/L (ref 3.5–5.2)
PROT SERPL-MCNC: 6.8 G/DL (ref 6–8.5)
SODIUM SERPL-SCNC: 142 MMOL/L (ref 136–145)
T3FREE SERPL-MCNC: 3.73 PG/ML (ref 2–4.4)
T4 FREE SERPL-MCNC: 1.66 NG/DL (ref 0.92–1.68)
TSH SERPL DL<=0.05 MIU/L-ACNC: 2.32 UIU/ML (ref 0.27–4.2)

## 2025-03-13 PROCEDURE — 84481 FREE ASSAY (FT-3): CPT

## 2025-03-13 PROCEDURE — 84443 ASSAY THYROID STIM HORMONE: CPT

## 2025-03-13 PROCEDURE — 84439 ASSAY OF FREE THYROXINE: CPT

## 2025-03-13 PROCEDURE — 82306 VITAMIN D 25 HYDROXY: CPT

## 2025-03-13 PROCEDURE — 80053 COMPREHEN METABOLIC PANEL: CPT

## 2025-03-16 ENCOUNTER — RESULTS FOLLOW-UP (OUTPATIENT)
Dept: INTERNAL MEDICINE | Facility: CLINIC | Age: 45
End: 2025-03-16
Payer: COMMERCIAL

## 2025-04-03 DIAGNOSIS — E55.9 VITAMIN D DEFICIENCY: ICD-10-CM

## 2025-04-03 RX ORDER — ERGOCALCIFEROL 1.25 MG/1
50000 CAPSULE, LIQUID FILLED ORAL WEEKLY
Qty: 4 CAPSULE | Refills: 3 | Status: SHIPPED | OUTPATIENT
Start: 2025-04-03

## 2025-06-13 ENCOUNTER — OFFICE VISIT (OUTPATIENT)
Dept: INTERNAL MEDICINE | Facility: CLINIC | Age: 45
End: 2025-06-13
Payer: COMMERCIAL

## 2025-06-13 VITALS
BODY MASS INDEX: 29.53 KG/M2 | DIASTOLIC BLOOD PRESSURE: 86 MMHG | TEMPERATURE: 98.2 F | OXYGEN SATURATION: 99 % | SYSTOLIC BLOOD PRESSURE: 138 MMHG | HEIGHT: 72 IN | HEART RATE: 82 BPM | WEIGHT: 218 LBS

## 2025-06-13 DIAGNOSIS — E03.9 HYPOTHYROIDISM (ACQUIRED): ICD-10-CM

## 2025-06-13 DIAGNOSIS — I10 PRIMARY HYPERTENSION: ICD-10-CM

## 2025-06-13 DIAGNOSIS — G47.33 OBSTRUCTIVE SLEEP APNEA SYNDROME: ICD-10-CM

## 2025-06-13 DIAGNOSIS — E55.9 VITAMIN D DEFICIENCY: ICD-10-CM

## 2025-06-13 RX ORDER — LOSARTAN POTASSIUM 25 MG/1
25 TABLET ORAL DAILY
Qty: 90 TABLET | Refills: 1 | Status: SHIPPED | OUTPATIENT
Start: 2025-06-13

## 2025-06-13 RX ORDER — TIRZEPATIDE 2.5 MG/.5ML
2.5 INJECTION, SOLUTION SUBCUTANEOUS WEEKLY
Qty: 2 ML | Refills: 3 | Status: SHIPPED | OUTPATIENT
Start: 2025-06-13

## 2025-06-13 RX ORDER — LEVOTHYROXINE SODIUM 25 UG/1
25 TABLET ORAL
Qty: 90 TABLET | Refills: 1 | Status: SHIPPED | OUTPATIENT
Start: 2025-06-13

## 2025-06-13 RX ORDER — ERGOCALCIFEROL 1.25 MG/1
50000 CAPSULE, LIQUID FILLED ORAL WEEKLY
Qty: 4 CAPSULE | Refills: 0 | Status: SHIPPED | OUTPATIENT
Start: 2025-06-13

## 2025-06-15 NOTE — PROGRESS NOTES
Office Note     Name: Arie Hayden    : 1980     MRN: [id number]     Chief Complaint  Primary Care Follow-Up, Hypertension, and Elevated Hepatic Enzymes    Subjective     History of Present Illness:  Arie Hayden is a 45 y.o. male who presents today for follow up.    History of Present Illness  The patient presents for weight loss, sleep apnea, hypertension, fatty liver, and vitamin D deficiency.    He reports experiencing weight loss, which he attributes to the trial of Wegovy. However, his insurance does not cover this medication, leading him to seek alternatives.     He has a diagnosis of sleep apnea and possesses a CPAP machine, although he does not utilize it. His most recent sleep study was conducted some time ago. There is a discussion about the potential for insurance coverage of Zepbound if a new sleep study confirms obstructive sleep apnea.    He has been advised by Dr. Mccoy to reduce his alcohol consumption and to aim for a weight of approximately 200 pounds, which is expected to significantly improve his fatty liver condition.    His blood pressure readings have been slightly elevated, consistent with the readings taken by Dr. Mccoy on Tuesday. He has exhausted his supply of losartan and is currently without a refill.    He is currently on thyroid medication and is seeking a refill of this prescription. Additionally, he is requesting a refill of his vitamin D supplement.    Review of Systems:   Review of Systems   Constitutional:  Positive for unexpected weight loss. Negative for chills, diaphoresis, fatigue and fever.   HENT:  Negative for congestion, sore throat and swollen glands.    Respiratory:  Negative for cough.    Cardiovascular:  Negative for chest pain.   Gastrointestinal:  Negative for abdominal pain, nausea and vomiting.   Genitourinary:  Negative for dysuria.   Musculoskeletal:  Negative for myalgias and neck pain.   Skin:  Negative for rash.   Neurological:   Negative for weakness and numbness.       Past Medical History:   Past Medical History:   Diagnosis Date    Allergic rhinitis     Colon polyp     COVID     Disease of thyroid gland     HYPO    Diverticulitis 01/15/2019    Diverticulosis     Fatigue     Low back pain     Rosacea     Scoliosis     Sleep apnea     DOES NOT USE CPAP       Past Surgical History:   Past Surgical History:   Procedure Laterality Date    APPENDECTOMY      COLON RESECTION N/A 05/06/2019    Procedure: LOWER ANTERIOR RESECTION, APPENDECTOMY, TAKEDOWN OF SPLENIC FLEXURE, EXCISION AND CLOSURE OF COLOCECAL FISTULA, UMBILICAL HERNIA REPAIR, AND PROCTOSCOPY;  Surgeon: Mitchel Beck MD;  Location:  [facility];  Service: General    COLONOSCOPY      CT ABSCESS DRAIN CYST ASP      X2    TONSILLECTOMY  1987    VASECTOMY  2011    VENTRAL/INCISIONAL HERNIA REPAIR N/A 12/4/2023    Procedure: OPEN INCISIONAL HERNIA REPAIR WITH MESH;  Surgeon: Royer Catalan MD;  Location:  [facility];  Service: General;  Laterality: N/A;    WISDOM TOOTH EXTRACTION         Family History:   Family History   Problem Relation Age of Onset    Factor V Leiden deficiency Father     Suicidality Father     Factor IX deficiency Father     Depression Father         Suicide    Breast cancer Maternal Grandmother     Cancer Maternal Grandmother         Breast    Lung cancer Maternal Grandfather     Cancer Maternal Grandfather         Lung    Factor V Leiden deficiency Paternal Grandfather     Prostate cancer Paternal Grandfather     Cancer Paternal Grandfather         Prostate    Hyperlipidemia Mother     Hypothyroidism Mother     No Known Problems Sister     No Known Problems Brother     No Known Problems Daughter     No Known Problems Daughter     Hyperlipidemia Maternal Uncle        Social History:   Social History     Socioeconomic History    Marital status:    Tobacco Use    Smoking status: Every Day     Current packs/day: 0.25     Average packs/day: 0.3 packs/day  "for 21.3 years (5.3 ttl pk-yrs)     Types: Cigarettes     Start date: 1/8/2004     Last attempt to quit: 11/20/2023    Smokeless tobacco: Former    Tobacco comments:     Light smoker, quit after surgery in 11/23 but started again   Vaping Use    Vaping status: Never Used   Substance and Sexual Activity    Alcohol use: Yes     Alcohol/week: 10.0 standard drinks of alcohol     Types: 10 Drinks containing 0.5 oz of alcohol per week     Comment: 8-10/week    Drug use: No    Sexual activity: Yes     Partners: Female     Birth control/protection: Vasectomy, Hysterectomy       Immunizations:   Immunization History   Administered Date(s) Administered    COVID-19 ([employer] [facility]) 11/15/2020    Fluzone  >6mos 10/24/2016, 11/27/2017, 11/15/2024    Fluzone (or Fluarix & Flulaval for VFC) >6mos 12/09/2021, 10/31/2022, 10/12/2023    Hepatitis A 09/17/2020    Influenza Injectable Mdck Pf Quad 12/09/2021, 10/31/2022    Pneumococcal Conjugate 20-Valent (PCV20) 10/12/2023    Tdap 09/17/2020        Medications:     Current Outpatient Medications:     levothyroxine (SYNTHROID, LEVOTHROID) 25 MCG tablet, Take 1 tablet by mouth Every Morning., Disp: 90 tablet, Rfl: 1    losartan (Cozaar) 25 MG tablet, Take 1 tablet by mouth Daily., Disp: 90 tablet, Rfl: 1    vitamin D (ERGOCALCIFEROL) 1.25 MG (52879 UT) capsule capsule, Take 1 capsule by mouth 1 (One) Time Per Week., Disp: 4 capsule, Rfl: 0    Seysara 150 MG tablet, Take 1 tablet by mouth 3 (Three) Times a Week. (Patient not taking: Reported on 6/13/2025), Disp: , Rfl:     Tirzepatide-Weight Management (Zepbound) 2.5 MG/0.5ML solution, Inject 0.5 mL under the skin into the appropriate area as directed 1 (One) Time Per Week., Disp: 2 mL, Rfl: 3    Allergies:   Allergies   Allergen Reactions    Pollen Extract Other (See Comments)     Sinus related       Objective     Vital Signs  /86   Pulse 82   Temp 98.2 °F (36.8 °C) (Infrared)   Ht 182 cm (71.65\")   Wt 98.9 kg (218 lb)   SpO2 99%   " BMI 29.85 kg/m²   Body mass index is 29.85 kg/m².     BMI is >= 25 and <30. (Overweight) The following options were offered after discussion;: weight loss educational material (shared in after visit summary), exercise counseling/recommendations, and nutrition counseling/recommendations       Physical Exam  Vitals reviewed.   Constitutional:       Appearance: Normal appearance. He is well-developed.   HENT:      Head: Normocephalic and atraumatic.      Right Ear: Hearing, tympanic membrane, ear canal and external ear normal.      Left Ear: Hearing, tympanic membrane, ear canal and external ear normal.      Nose: Nose normal.      Mouth/Throat:      Pharynx: Uvula midline.   Eyes:      General: Lids are normal.      Conjunctiva/sclera: Conjunctivae normal.      Pupils: Pupils are equal, round, and reactive to light.   Cardiovascular:      Rate and Rhythm: Normal rate and regular rhythm.      Heart sounds: Normal heart sounds.   Pulmonary:      Effort: Pulmonary effort is normal.      Breath sounds: Normal breath sounds.   Abdominal:      General: Bowel sounds are normal.      Palpations: Abdomen is soft.   Musculoskeletal:         General: Normal range of motion.      Cervical back: Full passive range of motion without pain, normal range of motion and neck supple.   Skin:     General: Skin is warm and dry.   Neurological:      Mental Status: He is alert and oriented to person, place, and time.      Deep Tendon Reflexes: Reflexes are normal and symmetric.   Psychiatric:         Speech: Speech normal.         Behavior: Behavior normal.         Thought Content: Thought content normal.         Judgment: Judgment normal.        Procedures     Results:  No results found for this or any previous visit (from the past 24 hours).     Assessment and Plan     Assessment/Plan:  Diagnoses and all orders for this visit:    1. BMI 31.0-31.9,adult (Primary)  -     Tirzepatide-Weight Management (Zepbound) 2.5 MG/0.5ML solution; Inject  0.5 mL under the skin into the appropriate area as directed 1 (One) Time Per Week.  Dispense: 2 mL; Refill: 3    2. Obstructive sleep apnea syndrome  -     Ambulatory Referral to Sleep Medicine    3. Primary hypertension  -     losartan (Cozaar) 25 MG tablet; Take 1 tablet by mouth Daily.  Dispense: 90 tablet; Refill: 1    4. Hypothyroidism (acquired)  Overview:  Continue levothyroxine 25 mcg daily.    Orders:  -     levothyroxine (SYNTHROID, LEVOTHROID) 25 MCG tablet; Take 1 tablet by mouth Every Morning.  Dispense: 90 tablet; Refill: 1    5. Vitamin D deficiency  Overview:  Continue vitamin D 50,000 units weekly.    Orders:  -     vitamin D (ERGOCALCIFEROL) 1.25 MG (21080 UT) capsule capsule; Take 1 capsule by mouth 1 (One) Time Per Week.  Dispense: 4 capsule; Refill: 0        Assessment & Plan  1. Weight management.  - Insurance may potentially cover Zepbound if we obtain newer sleep apnea study.  - Prescription for Zepbound issued through [facility].  - Advised to contact insurance provider to confirm coverage for Zepbound in relation to sleep apnea.  - Encouraged to start exercising again.    2. Sleep apnea.  - Referral to sleep medicine made for a new sleep study.  - If the study confirms obstructive sleep apnea, insurance may cover Zepbound.  - Discussed the potential benefits of weight loss on sleep apnea and overall health.  - Advised to use CPAP machine if diagnosed with obstructive sleep apnea.    3. Hypertension.  - Blood pressure was noted to be high during the visit.  - Prescription for losartan renewed.  - Advised to monitor blood pressure regularly.    4. Fatty liver.  - Advised to cut back on alcohol consumption.  - Recommended weight reduction to about 200 pounds to help with fatty liver.  - Discussed the potential benefits of weight loss on liver enzymes.  - Encouraged to maintain a healthy diet and exercise routine.    5. Vitamin D deficiency.  - Vitamin D level was 52.  - Prescription for  vitamin D renewed for one more month.  - Advised to get more sunlight exposure during the summer months.    6. Thyroid disorder.  - Prescription for thyroid medication renewed.  - Advised to monitor thyroid function regularly.    Follow-up  - Follow-up appointment scheduled in 3 months.     Follow Up  Return in about 3 months (around 9/13/2025).    Patient or patient representative verbalized consent for the use of Ambient Listening during the visit with  Rosemary Long PA-C for chart documentation. 6/15/2025  14:08 EDT      Rosemary Long PA-C   [facility] [place] [id number]  Answers submitted by the patient for this visit:  Problem not listed (Submitted on 6/13/2025)  Chief Complaint: Other medical problem  Reason for appointment: Follow up  anorexia: No  joint pain: No  change in stool: No  headaches: No  joint swelling: No  vertigo: No  visual change: No

## 2025-08-25 DIAGNOSIS — E55.9 VITAMIN D DEFICIENCY: ICD-10-CM

## 2025-08-26 RX ORDER — ERGOCALCIFEROL 1.25 MG/1
50000 CAPSULE, LIQUID FILLED ORAL WEEKLY
Qty: 4 CAPSULE | Refills: 3 | Status: SHIPPED | OUTPATIENT
Start: 2025-08-26

## (undated) DEVICE — COVER,MAYO STAND,XL,STERILE: Brand: MEDLINE

## (undated) DEVICE — SUT VIC 12X27 D8116 BX/12

## (undated) DEVICE — INTENDED FOR TISSUE SEPARATION, AND OTHER PROCEDURES THAT REQUIRE A SHARP SURGICAL BLADE TO PUNCTURE OR CUT.: Brand: BARD-PARKER ® STAINLESS STEEL BLADES

## (undated) DEVICE — SUT MNCRYL PLS ANTIB UD 3/0 PS2 27IN

## (undated) DEVICE — LEX BASIC NO DRAPE: Brand: MEDLINE INDUSTRIES, INC.

## (undated) DEVICE — LEX GENERAL ABDOMINAL SPLIT: Brand: MEDLINE INDUSTRIES, INC.

## (undated) DEVICE — MEDI-VAC YANKAUER SUCTION HANDLE: Brand: CARDINAL HEALTH

## (undated) DEVICE — ANTIBACTERIAL UNDYED BRAIDED (POLYGLACTIN 910), SYNTHETIC ABSORBABLE SUTURE: Brand: COATED VICRYL

## (undated) DEVICE — DRAPE, LAVH, STERILE: Brand: MEDLINE

## (undated) DEVICE — CLTH CLENS READYCLEANSE PERI CARE PK/5

## (undated) DEVICE — BLD SCLPL SS NO24 STRL

## (undated) DEVICE — GOWN,NON-REINFORCED,SIRUS,SET IN SLV,XL: Brand: MEDLINE

## (undated) DEVICE — COVER,LIGHT HANDLE,FLX,1/PK: Brand: MEDLINE INDUSTRIES, INC.

## (undated) DEVICE — 3M™ IOBAN™ 2 ANTIMICROBIAL INCISE DRAPE 6650EZ: Brand: IOBAN™ 2

## (undated) DEVICE — JELLY,LUBE,STERILE,FLIP TOP,TUBE,2-OZ: Brand: MEDLINE

## (undated) DEVICE — TBG PENCL TELESCP MEGADYNE SMOKE EVAC 10FT

## (undated) DEVICE — TRY SKINPREP DRYPREP

## (undated) DEVICE — STRIP,CLOSURE,WOUND,MEDI-STRIP,1/2X4: Brand: MEDLINE

## (undated) DEVICE — TOWEL,OR,DSP,ST,BLUE,STD,4/PK,20PK/CS: Brand: MEDLINE

## (undated) DEVICE — 450 ML BOTTLE OF 0.05% CHLORHEXIDINE GLUCONATE IN 99.95% STERILE WATER FOR IRRIGATION, USP AND APPLICATOR.: Brand: IRRISEPT ANTIMICROBIAL WOUND LAVAGE

## (undated) DEVICE — BNDR ABD PREMIUM/UNIV 10IN 27TO48IN

## (undated) DEVICE — SUT VIC 0 SH 27IN J418H

## (undated) DEVICE — ADHS LIQ MASTISOL 2/3ML

## (undated) DEVICE — PENCL E/S HNDSWCH ROCKRBTN HOLSTR 10FT

## (undated) DEVICE — GLV SURG PREMIERPRO MIC LTX PF SZ8 BRN

## (undated) DEVICE — TUBING, SUCTION, 1/4" X 10', STRAIGHT: Brand: MEDLINE

## (undated) DEVICE — SUT PROLN 2/0 PC3 8833H

## (undated) DEVICE — GLV SURG PREMIERPRO MIC LTX PF SZ7.5 BRN

## (undated) DEVICE — SPNG LAP PREWSH SFTPK 18X18IN STRL PK/5

## (undated) DEVICE — SUT PDS O CT1 CR/8 18IN Z740D

## (undated) DEVICE — GLV SURG SENSICARE MICRO PF LF 8.5 STRL

## (undated) DEVICE — TRAP FLD MINIVAC MEGADYNE 100ML

## (undated) DEVICE — SUT PDS 1 CTX 36IN VIO PDP371T

## (undated) DEVICE — DRSNG SURESITE123 4X10IN

## (undated) DEVICE — DRSNG WND BORDR/ADHS NONADHR/GZ LF 4X10IN STRL